# Patient Record
Sex: MALE | Race: BLACK OR AFRICAN AMERICAN | NOT HISPANIC OR LATINO | URBAN - METROPOLITAN AREA
[De-identification: names, ages, dates, MRNs, and addresses within clinical notes are randomized per-mention and may not be internally consistent; named-entity substitution may affect disease eponyms.]

---

## 2018-02-03 ENCOUNTER — INPATIENT (INPATIENT)
Facility: HOSPITAL | Age: 33
LOS: 8 days | Discharge: HOME HEALTH | End: 2018-02-12
Attending: SURGERY | Admitting: SURGERY
Payer: MEDICARE

## 2018-02-03 VITALS
RESPIRATION RATE: 18 BRPM | DIASTOLIC BLOOD PRESSURE: 99 MMHG | HEART RATE: 107 BPM | SYSTOLIC BLOOD PRESSURE: 129 MMHG | OXYGEN SATURATION: 95 %

## 2018-02-03 DIAGNOSIS — I48.91 UNSPECIFIED ATRIAL FIBRILLATION: ICD-10-CM

## 2018-02-03 DIAGNOSIS — K46.0 UNSPECIFIED ABDOMINAL HERNIA WITH OBSTRUCTION, WITHOUT GANGRENE: ICD-10-CM

## 2018-02-03 LAB
ALBUMIN SERPL ELPH-MCNC: 3.6 G/DL — SIGNIFICANT CHANGE UP (ref 3.3–5)
ALP SERPL-CCNC: 55 U/L — SIGNIFICANT CHANGE UP (ref 40–120)
ALT FLD-CCNC: 9 U/L — SIGNIFICANT CHANGE UP (ref 4–41)
APTT BLD: 28.7 SEC — SIGNIFICANT CHANGE UP (ref 27.5–37.4)
AST SERPL-CCNC: 22 U/L — SIGNIFICANT CHANGE UP (ref 4–40)
BASE EXCESS BLDV CALC-SCNC: 2 MMOL/L — SIGNIFICANT CHANGE UP
BASE EXCESS BLDV CALC-SCNC: 5.3 MMOL/L — SIGNIFICANT CHANGE UP
BASOPHILS # BLD AUTO: 0.02 K/UL — SIGNIFICANT CHANGE UP (ref 0–0.2)
BASOPHILS NFR BLD AUTO: 0.2 % — SIGNIFICANT CHANGE UP (ref 0–2)
BILIRUB SERPL-MCNC: 0.9 MG/DL — SIGNIFICANT CHANGE UP (ref 0.2–1.2)
BLD GP AB SCN SERPL QL: NEGATIVE — SIGNIFICANT CHANGE UP
BLOOD GAS VENOUS - CREATININE: 1.28 MG/DL — SIGNIFICANT CHANGE UP (ref 0.5–1.3)
BLOOD GAS VENOUS - CREATININE: 1.44 MG/DL — HIGH (ref 0.5–1.3)
BUN SERPL-MCNC: 24 MG/DL — HIGH (ref 7–23)
CALCIUM SERPL-MCNC: 8.1 MG/DL — LOW (ref 8.4–10.5)
CHLORIDE BLDV-SCNC: 100 MMOL/L — SIGNIFICANT CHANGE UP (ref 96–108)
CHLORIDE BLDV-SCNC: 92 MMOL/L — LOW (ref 96–108)
CHLORIDE SERPL-SCNC: 90 MMOL/L — LOW (ref 98–107)
CO2 SERPL-SCNC: 26 MMOL/L — SIGNIFICANT CHANGE UP (ref 22–31)
CREAT SERPL-MCNC: 1.37 MG/DL — HIGH (ref 0.5–1.3)
EOSINOPHIL # BLD AUTO: 0 K/UL — SIGNIFICANT CHANGE UP (ref 0–0.5)
EOSINOPHIL NFR BLD AUTO: 0 % — SIGNIFICANT CHANGE UP (ref 0–6)
GAS PNL BLDV: 127 MMOL/L — LOW (ref 136–146)
GAS PNL BLDV: 128 MMOL/L — LOW (ref 136–146)
GLUCOSE BLDV-MCNC: 107 — HIGH (ref 70–99)
GLUCOSE BLDV-MCNC: 95 — SIGNIFICANT CHANGE UP (ref 70–99)
GLUCOSE SERPL-MCNC: 95 MG/DL — SIGNIFICANT CHANGE UP (ref 70–99)
HCO3 BLDV-SCNC: 25 MMOL/L — SIGNIFICANT CHANGE UP (ref 20–27)
HCO3 BLDV-SCNC: 27 MMOL/L — SIGNIFICANT CHANGE UP (ref 20–27)
HCT VFR BLD CALC: 39.5 % — SIGNIFICANT CHANGE UP (ref 39–50)
HCT VFR BLDV CALC: 33.8 % — LOW (ref 39–51)
HCT VFR BLDV CALC: 39.5 % — SIGNIFICANT CHANGE UP (ref 39–51)
HGB BLD-MCNC: 12.6 G/DL — LOW (ref 13–17)
HGB BLDV-MCNC: 11 G/DL — LOW (ref 13–17)
HGB BLDV-MCNC: 12.9 G/DL — LOW (ref 13–17)
IMM GRANULOCYTES # BLD AUTO: 0.02 # — SIGNIFICANT CHANGE UP
IMM GRANULOCYTES NFR BLD AUTO: 0.2 % — SIGNIFICANT CHANGE UP (ref 0–1.5)
INR BLD: 1.47 — HIGH (ref 0.88–1.17)
LACTATE BLDV-MCNC: 1 MMOL/L — SIGNIFICANT CHANGE UP (ref 0.5–2)
LACTATE BLDV-MCNC: 1.6 MMOL/L — SIGNIFICANT CHANGE UP (ref 0.5–2)
LYMPHOCYTES # BLD AUTO: 0.7 K/UL — LOW (ref 1–3.3)
LYMPHOCYTES # BLD AUTO: 8.4 % — LOW (ref 13–44)
MCHC RBC-ENTMCNC: 27.3 PG — SIGNIFICANT CHANGE UP (ref 27–34)
MCHC RBC-ENTMCNC: 31.9 % — LOW (ref 32–36)
MCV RBC AUTO: 85.5 FL — SIGNIFICANT CHANGE UP (ref 80–100)
MONOCYTES # BLD AUTO: 0.62 K/UL — SIGNIFICANT CHANGE UP (ref 0–0.9)
MONOCYTES NFR BLD AUTO: 7.5 % — SIGNIFICANT CHANGE UP (ref 2–14)
NEUTROPHILS # BLD AUTO: 6.93 K/UL — SIGNIFICANT CHANGE UP (ref 1.8–7.4)
NEUTROPHILS NFR BLD AUTO: 83.7 % — HIGH (ref 43–77)
NRBC # FLD: 0 — SIGNIFICANT CHANGE UP
PCO2 BLDV: 51 MMHG — SIGNIFICANT CHANGE UP (ref 41–51)
PCO2 BLDV: 56 MMHG — HIGH (ref 41–51)
PH BLDV: 7.35 PH — SIGNIFICANT CHANGE UP (ref 7.32–7.43)
PH BLDV: 7.36 PH — SIGNIFICANT CHANGE UP (ref 7.32–7.43)
PLATELET # BLD AUTO: 222 K/UL — SIGNIFICANT CHANGE UP (ref 150–400)
PMV BLD: 10.7 FL — SIGNIFICANT CHANGE UP (ref 7–13)
PO2 BLDV: 38 MMHG — SIGNIFICANT CHANGE UP (ref 35–40)
PO2 BLDV: < 24 MMHG — LOW (ref 35–40)
POTASSIUM BLDV-SCNC: 3.2 MMOL/L — LOW (ref 3.4–4.5)
POTASSIUM BLDV-SCNC: 3.3 MMOL/L — LOW (ref 3.4–4.5)
POTASSIUM SERPL-MCNC: 3.6 MMOL/L — SIGNIFICANT CHANGE UP (ref 3.5–5.3)
POTASSIUM SERPL-SCNC: 3.6 MMOL/L — SIGNIFICANT CHANGE UP (ref 3.5–5.3)
PROT SERPL-MCNC: 6.9 G/DL — SIGNIFICANT CHANGE UP (ref 6–8.3)
PROTHROM AB SERPL-ACNC: 17 SEC — HIGH (ref 9.8–13.1)
RBC # BLD: 4.62 M/UL — SIGNIFICANT CHANGE UP (ref 4.2–5.8)
RBC # FLD: 13.6 % — SIGNIFICANT CHANGE UP (ref 10.3–14.5)
RH IG SCN BLD-IMP: POSITIVE — SIGNIFICANT CHANGE UP
RH IG SCN BLD-IMP: POSITIVE — SIGNIFICANT CHANGE UP
SAO2 % BLDV: 21.7 % — LOW (ref 60–85)
SAO2 % BLDV: 66.2 % — SIGNIFICANT CHANGE UP (ref 60–85)
SODIUM SERPL-SCNC: 131 MMOL/L — LOW (ref 135–145)
WBC # BLD: 8.29 K/UL — SIGNIFICANT CHANGE UP (ref 3.8–10.5)
WBC # FLD AUTO: 8.29 K/UL — SIGNIFICANT CHANGE UP (ref 3.8–10.5)

## 2018-02-03 PROCEDURE — 74177 CT ABD & PELVIS W/CONTRAST: CPT | Mod: 26

## 2018-02-03 PROCEDURE — 93010 ELECTROCARDIOGRAM REPORT: CPT | Mod: 76

## 2018-02-03 PROCEDURE — 44160 REMOVAL OF COLON: CPT

## 2018-02-03 PROCEDURE — 99222 1ST HOSP IP/OBS MODERATE 55: CPT | Mod: 57,GC

## 2018-02-03 PROCEDURE — 71045 X-RAY EXAM CHEST 1 VIEW: CPT | Mod: 26

## 2018-02-03 PROCEDURE — 49587: CPT | Mod: 59

## 2018-02-03 RX ORDER — RISPERIDONE 4 MG/1
1 TABLET ORAL
Qty: 0 | Refills: 0 | Status: DISCONTINUED | OUTPATIENT
Start: 2018-02-03 | End: 2018-02-03

## 2018-02-03 RX ORDER — METOPROLOL TARTRATE 50 MG
5 TABLET ORAL ONCE
Qty: 0 | Refills: 0 | Status: COMPLETED | OUTPATIENT
Start: 2018-02-03 | End: 2018-02-03

## 2018-02-03 RX ORDER — ACETAMINOPHEN 500 MG
1000 TABLET ORAL ONCE
Qty: 0 | Refills: 0 | Status: COMPLETED | OUTPATIENT
Start: 2018-02-04 | End: 2018-02-04

## 2018-02-03 RX ORDER — ENOXAPARIN SODIUM 100 MG/ML
40 INJECTION SUBCUTANEOUS EVERY 24 HOURS
Qty: 0 | Refills: 0 | Status: DISCONTINUED | OUTPATIENT
Start: 2018-02-03 | End: 2018-02-03

## 2018-02-03 RX ORDER — SODIUM CHLORIDE 9 MG/ML
1000 INJECTION INTRAMUSCULAR; INTRAVENOUS; SUBCUTANEOUS ONCE
Qty: 0 | Refills: 0 | Status: COMPLETED | OUTPATIENT
Start: 2018-02-03 | End: 2018-02-03

## 2018-02-03 RX ORDER — SODIUM CHLORIDE 9 MG/ML
500 INJECTION, SOLUTION INTRAVENOUS ONCE
Qty: 0 | Refills: 0 | Status: COMPLETED | OUTPATIENT
Start: 2018-02-03 | End: 2018-02-03

## 2018-02-03 RX ORDER — RISPERIDONE 4 MG/1
1 TABLET ORAL
Qty: 0 | Refills: 0 | Status: DISCONTINUED | OUTPATIENT
Start: 2018-02-03 | End: 2018-02-04

## 2018-02-03 RX ORDER — ACETAMINOPHEN 500 MG
1000 TABLET ORAL ONCE
Qty: 0 | Refills: 0 | Status: COMPLETED | OUTPATIENT
Start: 2018-02-03 | End: 2018-02-03

## 2018-02-03 RX ORDER — SODIUM CHLORIDE 9 MG/ML
500 INJECTION INTRAMUSCULAR; INTRAVENOUS; SUBCUTANEOUS ONCE
Qty: 0 | Refills: 0 | Status: COMPLETED | OUTPATIENT
Start: 2018-02-03 | End: 2018-02-03

## 2018-02-03 RX ORDER — METOPROLOL TARTRATE 50 MG
5 TABLET ORAL EVERY 6 HOURS
Qty: 0 | Refills: 0 | Status: DISCONTINUED | OUTPATIENT
Start: 2018-02-03 | End: 2018-02-04

## 2018-02-03 RX ORDER — MIDAZOLAM HYDROCHLORIDE 1 MG/ML
2 INJECTION, SOLUTION INTRAMUSCULAR; INTRAVENOUS ONCE
Qty: 0 | Refills: 0 | Status: DISCONTINUED | OUTPATIENT
Start: 2018-02-03 | End: 2018-02-03

## 2018-02-03 RX ORDER — METOPROLOL TARTRATE 50 MG
10 TABLET ORAL ONCE
Qty: 0 | Refills: 0 | Status: COMPLETED | OUTPATIENT
Start: 2018-02-03 | End: 2018-02-03

## 2018-02-03 RX ORDER — ENOXAPARIN SODIUM 100 MG/ML
40 INJECTION SUBCUTANEOUS EVERY 24 HOURS
Qty: 0 | Refills: 0 | Status: DISCONTINUED | OUTPATIENT
Start: 2018-02-03 | End: 2018-02-12

## 2018-02-03 RX ORDER — TRAZODONE HCL 50 MG
150 TABLET ORAL AT BEDTIME
Qty: 0 | Refills: 0 | Status: DISCONTINUED | OUTPATIENT
Start: 2018-02-03 | End: 2018-02-03

## 2018-02-03 RX ORDER — DIVALPROEX SODIUM 500 MG/1
500 TABLET, DELAYED RELEASE ORAL AT BEDTIME
Qty: 0 | Refills: 0 | Status: DISCONTINUED | OUTPATIENT
Start: 2018-02-03 | End: 2018-02-03

## 2018-02-03 RX ORDER — VALPROIC ACID (AS SODIUM SALT) 250 MG/5ML
250 SOLUTION, ORAL ORAL
Qty: 0 | Refills: 0 | Status: DISCONTINUED | OUTPATIENT
Start: 2018-02-03 | End: 2018-02-04

## 2018-02-03 RX ORDER — ALBUMIN HUMAN 25 %
250 VIAL (ML) INTRAVENOUS ONCE
Qty: 0 | Refills: 0 | Status: COMPLETED | OUTPATIENT
Start: 2018-02-03 | End: 2018-02-03

## 2018-02-03 RX ORDER — PIPERACILLIN AND TAZOBACTAM 4; .5 G/20ML; G/20ML
3.38 INJECTION, POWDER, LYOPHILIZED, FOR SOLUTION INTRAVENOUS EVERY 8 HOURS
Qty: 0 | Refills: 0 | Status: COMPLETED | OUTPATIENT
Start: 2018-02-03 | End: 2018-02-07

## 2018-02-03 RX ORDER — HYDROMORPHONE HYDROCHLORIDE 2 MG/ML
0.5 INJECTION INTRAMUSCULAR; INTRAVENOUS; SUBCUTANEOUS
Qty: 0 | Refills: 0 | Status: DISCONTINUED | OUTPATIENT
Start: 2018-02-03 | End: 2018-02-04

## 2018-02-03 RX ORDER — DRONEDARONE 400 MG/1
400 TABLET, FILM COATED ORAL
Qty: 0 | Refills: 0 | Status: DISCONTINUED | OUTPATIENT
Start: 2018-02-03 | End: 2018-02-04

## 2018-02-03 RX ORDER — DIAZEPAM 5 MG
1 TABLET ORAL
Qty: 12 | Refills: 0 | OUTPATIENT
Start: 2018-02-03 | End: 2018-02-06

## 2018-02-03 RX ORDER — METOPROLOL TARTRATE 50 MG
50 TABLET ORAL
Qty: 0 | Refills: 0 | Status: DISCONTINUED | OUTPATIENT
Start: 2018-02-03 | End: 2018-02-03

## 2018-02-03 RX ORDER — SODIUM CHLORIDE 9 MG/ML
1000 INJECTION INTRAMUSCULAR; INTRAVENOUS; SUBCUTANEOUS
Qty: 0 | Refills: 0 | Status: DISCONTINUED | OUTPATIENT
Start: 2018-02-03 | End: 2018-02-03

## 2018-02-03 RX ORDER — TRAZODONE HCL 50 MG
150 TABLET ORAL AT BEDTIME
Qty: 0 | Refills: 0 | Status: DISCONTINUED | OUTPATIENT
Start: 2018-02-03 | End: 2018-02-04

## 2018-02-03 RX ORDER — SODIUM CHLORIDE 9 MG/ML
1000 INJECTION INTRAMUSCULAR; INTRAVENOUS; SUBCUTANEOUS
Qty: 0 | Refills: 0 | Status: DISCONTINUED | OUTPATIENT
Start: 2018-02-03 | End: 2018-02-04

## 2018-02-03 RX ORDER — MORPHINE SULFATE 50 MG/1
2 CAPSULE, EXTENDED RELEASE ORAL EVERY 6 HOURS
Qty: 0 | Refills: 0 | Status: DISCONTINUED | OUTPATIENT
Start: 2018-02-03 | End: 2018-02-04

## 2018-02-03 RX ORDER — MORPHINE SULFATE 50 MG/1
4 CAPSULE, EXTENDED RELEASE ORAL EVERY 6 HOURS
Qty: 0 | Refills: 0 | Status: DISCONTINUED | OUTPATIENT
Start: 2018-02-03 | End: 2018-02-04

## 2018-02-03 RX ORDER — POTASSIUM CHLORIDE 20 MEQ
10 PACKET (EA) ORAL
Qty: 0 | Refills: 0 | Status: COMPLETED | OUTPATIENT
Start: 2018-02-03 | End: 2018-02-03

## 2018-02-03 RX ORDER — VANCOMYCIN HCL 1 G
1000 VIAL (EA) INTRAVENOUS ONCE
Qty: 0 | Refills: 0 | Status: COMPLETED | OUTPATIENT
Start: 2018-02-03 | End: 2018-02-03

## 2018-02-03 RX ORDER — PIPERACILLIN AND TAZOBACTAM 4; .5 G/20ML; G/20ML
3.38 INJECTION, POWDER, LYOPHILIZED, FOR SOLUTION INTRAVENOUS ONCE
Qty: 0 | Refills: 0 | Status: COMPLETED | OUTPATIENT
Start: 2018-02-03 | End: 2018-02-03

## 2018-02-03 RX ADMIN — Medication 1000 MILLIGRAM(S): at 17:13

## 2018-02-03 RX ADMIN — SODIUM CHLORIDE 2000 MILLILITER(S): 9 INJECTION INTRAMUSCULAR; INTRAVENOUS; SUBCUTANEOUS at 22:15

## 2018-02-03 RX ADMIN — Medication 500 MILLILITER(S): at 23:28

## 2018-02-03 RX ADMIN — SODIUM CHLORIDE 1500 MILLILITER(S): 9 INJECTION INTRAMUSCULAR; INTRAVENOUS; SUBCUTANEOUS at 20:45

## 2018-02-03 RX ADMIN — SODIUM CHLORIDE 1500 MILLILITER(S): 9 INJECTION, SOLUTION INTRAVENOUS at 20:15

## 2018-02-03 RX ADMIN — SODIUM CHLORIDE 1000 MILLILITER(S): 9 INJECTION INTRAMUSCULAR; INTRAVENOUS; SUBCUTANEOUS at 14:18

## 2018-02-03 RX ADMIN — Medication 100 MILLIEQUIVALENT(S): at 20:40

## 2018-02-03 RX ADMIN — Medication 250 MILLIGRAM(S): at 17:13

## 2018-02-03 RX ADMIN — MIDAZOLAM HYDROCHLORIDE 2 MILLIGRAM(S): 1 INJECTION, SOLUTION INTRAMUSCULAR; INTRAVENOUS at 15:08

## 2018-02-03 RX ADMIN — Medication 400 MILLIGRAM(S): at 14:59

## 2018-02-03 RX ADMIN — Medication 100 MILLIEQUIVALENT(S): at 21:43

## 2018-02-03 RX ADMIN — Medication 10 MILLIGRAM(S): at 22:15

## 2018-02-03 RX ADMIN — PIPERACILLIN AND TAZOBACTAM 25 GRAM(S): 4; .5 INJECTION, POWDER, LYOPHILIZED, FOR SOLUTION INTRAVENOUS at 23:44

## 2018-02-03 RX ADMIN — Medication 5 MILLIGRAM(S): at 22:15

## 2018-02-03 RX ADMIN — SODIUM CHLORIDE 2000 MILLILITER(S): 9 INJECTION INTRAMUSCULAR; INTRAVENOUS; SUBCUTANEOUS at 17:13

## 2018-02-03 RX ADMIN — PIPERACILLIN AND TAZOBACTAM 200 GRAM(S): 4; .5 INJECTION, POWDER, LYOPHILIZED, FOR SOLUTION INTRAVENOUS at 16:02

## 2018-02-03 NOTE — CONSULT NOTE ADULT - ASSESSMENT
33yoM w/ PMHx HTN, autism (non-verbal at baseline), chronic umbilical hernia, PAF (no AC at home) p/w perceived abd pain found to have ventral hernia with gangrene due to Cardona's hernia and foreign body.  In PACU, patient to have afib with RVR HR up to 130's and hypotensive. 33yoM w/ PMHx HTN, autism (non-verbal at baseline), chronic umbilical hernia, PAF (no AC at home) p/w perceived abd pain found to have ventral hernia with gangrene due to Cardona's hernia and foreign body.  In PACU, patient to have afib with RVR HR up to 130's and hypotensive.     PAULO-VASC score 1  HAS-BLED score 2

## 2018-02-03 NOTE — ED PROVIDER NOTE - OBJECTIVE STATEMENT
33m w autism, non-verbal at baseline, HTN, here today for perceived abd pain. Pt has chronic umbilical hernia (unknown whether congenital). Was seen at [facility] ED 1/31 for 1 episode of vomiting, and was discharged w presumed dx of viral gastro. Since then has not vomited, but family have perceived that pt appears more uncomfortable in recent few days, shameka when rising from seated. Had recent URI sx including cough that have largely resolved. No fever. Tristiniyl denies any recent abd imaging.

## 2018-02-03 NOTE — ED PROVIDER NOTE - PROGRESS NOTE DETAILS
Elizabeth Goldberger PGY-1: radiology called re CTAP, suspicious for strangulated hernia w possible ischemic changes. calling surg

## 2018-02-03 NOTE — H&P ADULT - NSHPLABSRESULTS_GEN_ALL_CORE
12.6   8.29  )-----------( 222      ( 03 Feb 2018 13:06 )             39.5       02-03    131<L>  |  90<L>  |  24<H>  ----------------------------<  95  3.6   |  26  |  1.37<H>    Ca    8.1<L>      03 Feb 2018 13:06    TPro  6.9  /  Alb  3.6  /  TBili  0.9  /  DBili  x   /  AST  22  /  ALT  9   /  AlkPhos  55  02-03    PT/INR - ( 03 Feb 2018 16:58 )   PT: 17.0 SEC;   INR: 1.47     PTT - ( 03 Feb 2018 16:58 )  PTT:28.7 SEC    Blood Typing (ABO + Rho D) (02.03.18 @ 17:18)    Rh Interpretation: Positive    ABO Interpretation: A      Imaging and other studies:  CT abd/pelvis: (final read pending; preliminary read base on reviewed of imaging w/ Radiologist in person) incarcerated umbilical hernia containing bowel and possible foreign object in hernia & colon, concern for bowel ischemia as bowel walls not enhancing.      Xray Chest 1 View AP/PA (02.03.18 @ 15:27) >    INTERPRETATION:  Patchy right lung opacities are likely infectious in   etiology.  Cardiac size is enlarged.

## 2018-02-03 NOTE — ED ADULT NURSE NOTE - OBJECTIVE STATEMENT
pts family brought pt in for hernia that normally is soft --appears hard with slight redness around umbilical area--pt had #20 IV placed in lt antecubital--pt had bloods drawn and sent--pt seen by MD and CAT scan scheduled. Pt given versed by MD for sedation in CAT scan

## 2018-02-03 NOTE — H&P ADULT - NSHPPHYSICALEXAM_GEN_ALL_CORE
T(C): 37.1 (02-03-18 @ 16:02)  HR: 82 (02-03-18 @ 16:02) (82 - 107)  BP: 87/43 (02-03-18 @ 16:02) (87/43 - 129/99)  RR: 21 (02-03-18 @ 16:02) (18 - 21)  SpO2: 97% (02-03-18 @ 16:02) (95% - 97%)  Wt(kg): --  Tmax: T(C): , Max: 37.1 (02-03-18 @ 16:02)  Wt(kg): --    General: well developed, well nourished, NAD  Neuro: moves all extremities spontaneously; does not answer questions  HEENT: dysmorphic, anicteric, mucosa moist  Respiratory: airway patent, respirations unlabored on NC  CVS: regular  Abdomen: tender (grimaces), distended at umbilicus; erythema & discoloration of skin overlying umbilicus  Extremities: no edema, movement grossly intact

## 2018-02-03 NOTE — ED PROVIDER NOTE - ATTENDING CONTRIBUTION TO CARE
I performed a history and physical exam of the patient and discussed their management with the resident and /or advanced care provider. I reviewed the resident and /or ACP's note and agree with the documented findings and plan of care. My medical decision making and observations are found above.  Abd soft, red swollen skin above umbilicus.

## 2018-02-03 NOTE — BRIEF OPERATIVE NOTE - PROCEDURE
<<-----Click on this checkbox to enter Procedure Laparotomy  02/03/2018  ileocecectomy  Active  [id number]

## 2018-02-03 NOTE — H&P ADULT - ASSESSMENT
33M PMHx autism, HTN, chronic umbilical hernia presenting w/ incarcerated umbilical hernia containing possible foreign object. Requires surgical reduction & possible bowel resection.    #Admit to General Surgery A team, Attending Dr. Beckford      -Guardian consented for: umbilical hernia repair, possible mesh, possible exploratory laparotomy, possible ostomy, possible bowel resection.      -Diet: NPO  -DVTppx: lovenox, SCD's  -IVF: NS @100/h  -monitor QTc for prolongation, per Pharmacy due to interaction of dronedarone & psychiatric Rx    -continue home Rx as tolerated; may require IV formulations if NPO post-op, will curbside psychiatry/neurology/pharmacy if necessary for guidance.    #con't home psych/neuro Rx: divalproex, risperidone, trazodone  #hold: temazepam  #con't home cardiac Rx: dronedarone; metoprolol tartrate in lieu of metoprolol ER      WILFRED Campbell MD (PGY2)    (Please page A team [phone] for questions/concerns.)

## 2018-02-03 NOTE — CONSULT NOTE ADULT - ASSESSMENT
33M PMHx autism, HTN, chronic umbilical hernia presenting w/ incarcerated umbilical hernia containing possible foreign object. Requires surgical reduction & possible bowel resection.    Admit to General Surgery A team, Attending Dr. Beckford    -Guardian consented for: umbilical hernia repair, possible mesh, possible exploratory laparotomy, possible ostomy, possible bowel resection.    -Diet: NPO  -DVTppx: lovenox  -IVF: NS @100/h    -continue home Rx as tolerated; may require IV formulations if NPO post-op        WILFRED Campbell MD (PGY2)    (Please page A team [phone] for questions/concerns.)

## 2018-02-03 NOTE — CONSULT NOTE ADULT - SUBJECTIVE AND OBJECTIVE BOX
General Surgery Consult  A team [id number]    CC: 33y old Male admitted with a chief complaint of , now    (history obtained from sisters at bedside, chart & sister Doris on phone    HPI:  33M PMHx autism (non-verbal at baseline), HTN, chronic umbilical hernia presented to [facility] ED the afternoon of 2/3/18 for perceived abd pain. Unknown how long hernia present, or whether congenital; never able to reduce. Recently presented to OSH 3 days prior to presentation ([facility] ED 1/31) complaining of 1 episode of vomiting, and sent home w/ dx of viral gastroenteritis. Since that episode patient has not vomited, but family have perceived that pt appears more uncomfortable in recent few days and has been hiccuping. His umbilical area also appeared to grow in size & the overlying skin became red. Recent URI sx including cough that have largely resolved. Denies fever. last BM the morning of presentation and in ED. Patient hospitalized ~2years ago at [facility] (unclear reason), and was discharged on puree/mush diet for ~6months.     PMHx: Hypertension  Autism spectrum disorder    PSHx:   Medications (inpatient): sodium chloride 0.9% Bolus 1000 milliLiter(s) IV Bolus once  vancomycin  IVPB 1000 milliGRAM(s) IV Intermittent once    Medications (PRN):  Allergies: No Known Allergies  (Intolerances: )  Social Hx:     Physical Exam  T(C): 37.1 (02-03-18 @ 16:02)  HR: 82 (02-03-18 @ 16:02) (82 - 107)  BP: 87/43 (02-03-18 @ 16:02) (87/43 - 129/99)  RR: 21 (02-03-18 @ 16:02) (18 - 21)  SpO2: 97% (02-03-18 @ 16:02) (95% - 97%)  Wt(kg): --  Tmax: T(C): , Max: 37.1 (02-03-18 @ 16:02)  Wt(kg): --    General: well developed, well nourished, NAD  Neuro: alert and oriented, no focal deficits, moves all extremities spontaneously  HEENT: NCAT, EOMI, anicteric, mucosa moist  Respiratory: airway patent, respirations unlabored  CVS: regular rate and rhythm  Abdomen: soft, nontender, nondistended  Extremities: no edema, sensation and movement grossly intact  Skin: warm, dry, appropriate color    Labs:                        12.6   8.29  )-----------( 222      ( 03 Feb 2018 13:06 )             39.5       02-03    131<L>  |  90<L>  |  24<H>  ----------------------------<  95  3.6   |  26  |  1.37<H>    Ca    8.1<L>      03 Feb 2018 13:06    TPro  6.9  /  Alb  3.6  /  TBili  0.9  /  DBili  x   /  AST  22  /  ALT  9   /  AlkPhos  55  02-03            Imaging and other studies: General Surgery Consult  A team [id number]    CC: 33y old Male admitted with a chief complaint of , now    (history obtained from sisters at bedside, chart & sister Doris on phone    HPI:  33M PMHx autism (non-verbal at baseline), HTN, chronic umbilical hernia presented to [facility] ED the afternoon of 2/3/18 for perceived abd pain. Unknown how long hernia present, or whether congenital; never able to reduce. Recently presented to OSH 3 days prior to presentation ([facility] ED 1/31) complaining of 1 episode of vomiting, and sent home w/ dx of viral gastroenteritis. Since that episode patient has not vomited, but family have perceived that pt appears more uncomfortable in recent few days and has been hiccuping. His umbilical area also appeared to grow in size & the overlying skin became red. Recent URI sx including cough that have largely resolved. Denies fever. last BM the morning of presentation and in ED. Patient hospitalized ~2years ago at [facility] (after eating a banana whole, including peel) for esophageal tear (no operative intervention) and was discharged on liquid then puree/mush diet for ~6months. Patient reportedly will eat foreign objects within reach.     In ED VS: T37.1, P107, /99, RR18, SpO2 95% on RA. Labs notable for 83% neutrophils, Na 131, Cl 90, BUN 24, Cr 1.4, venous lactate 1.6. CT showing incarcerated umbilical hernia containing bowel, possibly ischemic changes and foreign objects in hernia & R colon. General Surgery Consulted.     Guardian: Doris Torres (sister) [phone]    PMHx: Hypertension  Autism spectrum disorder    PSHx: none    Medications (inpatient): sodium chloride 0.9% Bolus 1000 milliLiter(s) IV Bolus once  vancomycin  IVPB 1000 milliGRAM(s) IV Intermittent once    Medications (PRN): x    Home Medications:  Multaq (Dronaderone) 400mg BID  Metoprolol ER 100mg qd  Risperidone 1mg BID  Divalproex  qhs  Trazadone 150mg qhs  Temazepam 15mg PRN qhs    Allergies: No Known Allergies  (Intolerances: x)  Social Hx: autistic, non-verbal baseline, repeats back what is said to him. Sister is guardian.     Physical Exam  T(C): 37.1 (02-03-18 @ 16:02)  HR: 82 (02-03-18 @ 16:02) (82 - 107)  BP: 87/43 (02-03-18 @ 16:02) (87/43 - 129/99)  RR: 21 (02-03-18 @ 16:02) (18 - 21)  SpO2: 97% (02-03-18 @ 16:02) (95% - 97%)  Wt(kg): --  Tmax: T(C): , Max: 37.1 (02-03-18 @ 16:02)  Wt(kg): --    General: well developed, well nourished, NAD  Neuro: alert and oriented, no focal deficits, moves all extremities spontaneously  HEENT: NCAT, EOMI, anicteric, mucosa moist  Respiratory: airway patent, respirations unlabored  CVS: regular rate and rhythm  Abdomen: soft, nontender, nondistended  Extremities: no edema, sensation and movement grossly intact  Skin: warm, dry, appropriate color    Labs:                        12.6   8.29  )-----------( 222      ( 03 Feb 2018 13:06 )             39.5       02-03    131<L>  |  90<L>  |  24<H>  ----------------------------<  95  3.6   |  26  |  1.37<H>    Ca    8.1<L>      03 Feb 2018 13:06    TPro  6.9  /  Alb  3.6  /  TBili  0.9  /  DBili  x   /  AST  22  /  ALT  9   /  AlkPhos  55  02-03            Imaging and other studies: General Surgery Consult  A team [id number]    CC: 33y old Male admitted with a chief complaint of , now    (history obtained from sisters at bedside, chart & sister Doris on phone    HPI:  33M PMHx autism (non-verbal at baseline), HTN, chronic umbilical hernia presented to [facility] ED the afternoon of 2/3/18 for perceived abd pain. Unknown how long hernia present, or whether congenital; never able to reduce. Recently presented to OSH 3 days prior to presentation ([facility] ED 1/31) complaining of 1 episode of vomiting, and sent home w/ dx of viral gastroenteritis. Since that episode patient has not vomited, but family have perceived that pt appears more uncomfortable in recent few days and has been hiccuping. His umbilical area also appeared to grow in size & the overlying skin became red. Recent URI sx including cough that have largely resolved. Denies fever. last BM the morning of presentation and in ED. Patient hospitalized ~2 years ago at [facility] (after eating a banana whole, including peel) for esophageal tear (no operative intervention) and was discharged on liquid then puree/mush diet for ~6months. Patient reportedly will eat foreign objects within reach.     In ED VS: T37.1, P107, /99, RR18, SpO2 95% on RA. Labs notable for 83% neutrophils, Na 131, Cl 90, BUN 24, Cr 1.4, venous lactate 1.6. CT showing incarcerated umbilical hernia containing bowel, possibly ischemic changes and foreign objects in hernia & R colon. Received 2L NS, vanc/zosyn. General Surgery Consulted.     Guardian: Doris Torres (sister) [phone]    PMHx: Hypertension  Autism spectrum disorder    PSHx: none    Medications (inpatient): sodium chloride 0.9% Bolus 1000 milliLiter(s) IV Bolus once  vancomycin  IVPB 1000 milliGRAM(s) IV Intermittent once    Medications (PRN): x    Home Medications:  Multaq (Dronaderone) 400mg BID  Metoprolol ER 100mg qd  Risperidone 1mg BID  Divalproex  qhs  Trazadone 150mg qhs  Temazepam 15mg PRN qhs    Allergies: No Known Allergies  (Intolerances: x)  Social Hx: autistic, non-verbal baseline, repeats back what is said to him. Sister is guardian.     Physical Exam  T(C): 37.1 (02-03-18 @ 16:02)  HR: 82 (02-03-18 @ 16:02) (82 - 107)  BP: 87/43 (02-03-18 @ 16:02) (87/43 - 129/99)  RR: 21 (02-03-18 @ 16:02) (18 - 21)  SpO2: 97% (02-03-18 @ 16:02) (95% - 97%)  Wt(kg): --  Tmax: T(C): , Max: 37.1 (02-03-18 @ 16:02)  Wt(kg): --    General: well developed, well nourished, NAD  Neuro: moves all extremities spontaneously; does not answer questions  HEENT: dysmorphic, anicteric, mucosa moist  Respiratory: airway patent, respirations unlabored on NC  CVS: regular  Abdomen: tender (grimaces), distended at umbilicus; erythema & discoloration of skin overlying umbilicus  Extremities: no edema, movement grossly intact      Labs:                        12.6   8.29  )-----------( 222      ( 03 Feb 2018 13:06 )             39.5       02-03    131<L>  |  90<L>  |  24<H>  ----------------------------<  95  3.6   |  26  |  1.37<H>    Ca    8.1<L>      03 Feb 2018 13:06    TPro  6.9  /  Alb  3.6  /  TBili  0.9  /  DBili  x   /  AST  22  /  ALT  9   /  AlkPhos  55  02-03            Imaging and other studies: General Surgery Consult  A team [id number]    CC: 33y old Male admitted with a chief complaint of , now    (history obtained from sisters at bedside, chart & sister Doris on phone    HPI:  33M PMHx autism (non-verbal at baseline), HTN, chronic umbilical hernia presented to [facility] ED the afternoon of 2/3/18 for perceived abd pain. Unknown how long hernia present, or whether congenital; never able to reduce. Recently presented to OSH 3 days prior to presentation ([facility] ED 1/31) complaining of 1 episode of vomiting, and sent home w/ dx of viral gastroenteritis. Since that episode patient has not vomited, but family have perceived that pt appears more uncomfortable in recent few days and has been hiccuping. His umbilical area also appeared to grow in size & the overlying skin became red. Recent URI sx including cough that have largely resolved. Denies fever. last BM the morning of presentation and in ED. Patient hospitalized ~2 years ago at [facility] (after eating a banana whole, including peel) for esophageal tear (no operative intervention) and was discharged on liquid then puree/mush diet for ~6months. Patient reportedly will eat foreign objects within reach.     In ED VS: T37.1, P107, /99, RR18, SpO2 95% on RA. Labs notable for 83% neutrophils, Na 131, Cl 90, BUN 24, Cr 1.4, venous lactate 1.6. CT showing incarcerated umbilical hernia containing bowel, possibly ischemic changes and foreign objects in hernia & R colon. Received 2L NS, vanc/zosyn. General Surgery Consulted.     Guardian: Doris Torres (sister) [phone]    PMHx: Hypertension  Autism spectrum disorder    PSHx: none    Medications (inpatient): sodium chloride 0.9% Bolus 1000 milliLiter(s) IV Bolus once  vancomycin  IVPB 1000 milliGRAM(s) IV Intermittent once    Medications (PRN): x    Home Medications:  Multaq (Dronaderone) 400mg BID  Metoprolol ER 100mg qd  Risperidone 1mg BID  Divalproex  qhs  Trazadone 150mg qhs  Temazepam 15mg PRN qhs    Allergies: No Known Allergies  (Intolerances: x)  Social Hx: autistic, non-verbal baseline, repeats back what is said to him. Sister is guardian.     Physical Exam  T(C): 37.1 (02-03-18 @ 16:02)  HR: 82 (02-03-18 @ 16:02) (82 - 107)  BP: 87/43 (02-03-18 @ 16:02) (87/43 - 129/99)  RR: 21 (02-03-18 @ 16:02) (18 - 21)  SpO2: 97% (02-03-18 @ 16:02) (95% - 97%)  Wt(kg): --  Tmax: T(C): , Max: 37.1 (02-03-18 @ 16:02)  Wt(kg): --    General: well developed, well nourished, NAD  Neuro: moves all extremities spontaneously; does not answer questions  HEENT: dysmorphic, anicteric, mucosa moist  Respiratory: airway patent, respirations unlabored on NC  CVS: regular  Abdomen: tender (grimaces), distended at umbilicus; erythema & discoloration of skin overlying umbilicus  Extremities: no edema, movement grossly intact      Labs:                        12.6   8.29  )-----------( 222      ( 03 Feb 2018 13:06 )             39.5       02-03    131<L>  |  90<L>  |  24<H>  ----------------------------<  95  3.6   |  26  |  1.37<H>    Ca    8.1<L>      03 Feb 2018 13:06    TPro  6.9  /  Alb  3.6  /  TBili  0.9  /  DBili  x   /  AST  22  /  ALT  9   /  AlkPhos  55  02-03      Imaging and other studies:  CT abd/pelvis: final read pending

## 2018-02-03 NOTE — ED PROVIDER NOTE - MEDICAL DECISION MAKING DETAILS
33m w ASD and umbilical hernia here for perceived abd pain, w erythema, firmness and induration of hernia. Concern for incarcerated hernia though remainder of abd soft, vs abscess at site of hernia. Will check cmp, cbc, vbg, ctap; fluids; reassess 33m w ASD and umbilical hernia here for perceived abd pain, w erythema, firmness and induration of hernia. Concern for incarcerated hernia though remainder of abd soft, vs abscess at site of hernia. Will check cmp, cbc, vbg, ctap; fluids; reassess.  Malachi: red swollen area above umbilicul, hernia vs infection. Will CT. Not obviously reducible hernia. autism makes it hard to assess pain.

## 2018-02-03 NOTE — H&P ADULT - HISTORY OF PRESENT ILLNESS
33M PMHx autism (non-verbal at baseline), HTN, chronic umbilical hernia presented to [facility] ED the afternoon of 2/3/18 for perceived abd pain. Unknown how long hernia present, or whether congenital; never able to reduce. Recently presented to OSH 3 days prior to presentation ([facility] ED 1/31) complaining of 1 episode of vomiting, and sent home w/ dx of viral gastroenteritis. Since that episode patient has not vomited, but family have perceived that pt appears more uncomfortable in recent few days and has been hiccuping. His umbilical area also appeared to grow in size & the overlying skin became red. Recent URI sx including cough that have largely resolved. Denies fever. last BM the morning of presentation and in ED. Patient hospitalized ~2 years ago at [facility] (after eating a banana whole, including peel) for esophageal tear (no operative intervention) and was discharged on liquid then puree/mush diet for ~6months. Patient reportedly will eat foreign objects within reach.     In ED VS: T37.1, P107, /99, RR18, SpO2 95% on RA. Labs notable for 83% neutrophils, Na 131, Cl 90, BUN 24, Cr 1.4, venous lactate 1.6. CT showing incarcerated umbilical hernia containing bowel, possibly ischemic changes and foreign objects in hernia & R colon. Received 2L NS, vanc/zosyn. General Surgery Consulted.

## 2018-02-03 NOTE — BRIEF OPERATIVE NOTE - POST-OP DX
Ventral hernia with gangrene  02/03/2018  due to Cardona's hernia and foreign body (likely chicken bone)  Active  Justyna Brush

## 2018-02-03 NOTE — CONSULT NOTE ADULT - SUBJECTIVE AND OBJECTIVE BOX
Date of Admission: 2/3/2018    CHIEF COMPLAINT: abdominal pain    HISTORY OF PRESENT ILLNESS:  This is a 33yoM w/ PMHx HTN, autism (non-verbal at baseline), chronic umbilical hernia, PAF (no AC at home) p/w perceived abd pain found to have ventral hernia with gangrene due to Cardona's hernia and foreign body.  In PACU, patient to have afib with RVR HR up to 130's and hypotensive.  Patient was given metoprolol 10mg IVP with minimal response.  Hypotension responded to fluid boluses.  Cardiology consult for Afib w/ RVR.    Allergies  No Known Allergies    MEDICATIONS:  diltiazem Injectable 10 milliGRAM(s) IV Push once  dronedarone 400 milliGRAM(s) Oral two times a day  enoxaparin Injectable 40 milliGRAM(s) SubCutaneous every 24 hours  metoprolol    tartrate Injectable 5 milliGRAM(s) IV Push every 6 hours  metoprolol    tartrate Injectable 10 milliGRAM(s) IV Push once  metoprolol    tartrate Injectable 5 milliGRAM(s) IV Push once  piperacillin/tazobactam IVPB. 3.375 Gram(s) IV Intermittent every 8 hours  HYDROmorphone  Injectable 0.5 milliGRAM(s) IV Push every 10 minutes PRN  morphine  - Injectable 2 milliGRAM(s) IV Push every 6 hours PRN  morphine  - Injectable 4 milliGRAM(s) IV Push every 6 hours PRN  risperiDONE   Tablet 1 milliGRAM(s) Oral two times a day  traZODone 150 milliGRAM(s) Oral at bedtime  valproic  acid Syrup 250 milliGRAM(s) Oral two times a day  sodium chloride 0.9% Bolus 1000 milliLiter(s) IV Bolus once  sodium chloride 0.9%. 1000 milliLiter(s) IV Continuous <Continuous>      PAST MEDICAL & SURGICAL HISTORY:  Hypertension  Autism spectrum disorder  No significant past surgical history    FAMILY HISTORY:  No pertinent family history in first degree relatives    SOCIAL HISTORY:    Denies alcohol, smoking, illicit drug use    REVIEW OF SYSTEMS:  See HPI. Otherwise, 10 point ROS done and otherwise negative.    PHYSICAL EXAM:  T(C): 36.8 (02-03-18 @ 19:55), Max: 37.1 (02-03-18 @ 16:02)  HR: 120 (02-03-18 @ 22:00) (76 - 132)  BP: 106/53 (02-03-18 @ 21:45) (87/43 - 129/99)  RR: 18 (02-03-18 @ 22:00) (15 - 26)  SpO2: 94% (02-03-18 @ 22:00) (94% - 100%)  Wt(kg): --  I&O's Summary    03 Feb 2018 07:01  -  03 Feb 2018 22:30  --------------------------------------------------------  IN: 200 mL / OUT: 145 mL / NET: 55 mL      Appearance: Normal	  HEENT:   Normal oral mucosa, PERRL, EOMI	  Lymphatic: No lymphadenopathy  Cardiovascular: Normal S1 S2, No JVD, No murmurs, No edema  Respiratory: Lungs clear to auscultation	  Gastrointestinal:  Soft, Non-tender, + BS	  Skin: No rashes, No ecchymoses, No cyanosis	  Neurologic: Nonverbal  Extremities: Normal range of motion, No clubbing, cyanosis or edema  Vascular: Peripheral pulses palpable 2+ bilaterally    LABS:	 	  CBC Full  -  ( 03 Feb 2018 13:06 )  WBC Count : 8.29 K/uL  Hemoglobin : 12.6 g/dL  Hematocrit : 39.5 %  Platelet Count - Automated : 222 K/uL  Mean Cell Volume : 85.5 fL  Mean Cell Hemoglobin : 27.3 pg  Mean Cell Hemoglobin Concentration : 31.9 %  Auto Neutrophil # : 6.93 K/uL  Auto Lymphocyte # : 0.70 K/uL  Auto Monocyte # : 0.62 K/uL  Auto Eosinophil # : 0.00 K/uL  Auto Basophil # : 0.02 K/uL  Auto Neutrophil % : 83.7 %  Auto Lymphocyte % : 8.4 %  Auto Monocyte % : 7.5 %  Auto Eosinophil % : 0.0 %  Auto Basophil % : 0.2 %    02-03    131<L>  |  90<L>  |  24<H>  ----------------------------<  95  3.6   |  26  |  1.37<H>    Ca    8.1<L>      03 Feb 2018 13:06    TPro  6.9  /  Alb  3.6  /  TBili  0.9  /  DBili  x   /  AST  22  /  ALT  9   /  AlkPhos  55  02-03

## 2018-02-03 NOTE — CONSULT NOTE ADULT - PROBLEM SELECTOR RECOMMENDATION 9
Patient minimally responded to metoprolol IVP  -start cardizem gtt @ 5mg/hr   -c/w fluids for hypotension management  -consider digoxin or amiodarone loading if not responding to cardizem gtt or becomes hypotensive   -TTE in AM to evaluate LV function

## 2018-02-03 NOTE — ED ADULT TRIAGE NOTE - CHIEF COMPLAINT QUOTE
pt's sister states that pt has been having abd pain for approx 1 week, seen in outside hospital ED dx with hernia, not improving.  pt not acting himself, more agitated.  PMH Autism, non verbal.  Unable to obtain temp in triage

## 2018-02-04 LAB
ANISOCYTOSIS BLD QL: SLIGHT — SIGNIFICANT CHANGE UP
APTT BLD: 28.9 SEC — SIGNIFICANT CHANGE UP (ref 27.5–37.4)
APTT BLD: 31 SEC — SIGNIFICANT CHANGE UP (ref 27.5–37.4)
BASE EXCESS BLDA CALC-SCNC: -1.1 MMOL/L — SIGNIFICANT CHANGE UP
BASE EXCESS BLDA CALC-SCNC: 0 MMOL/L — SIGNIFICANT CHANGE UP
BASE EXCESS BLDA CALC-SCNC: 0.6 MMOL/L — SIGNIFICANT CHANGE UP
BASE EXCESS BLDA CALC-SCNC: 0.8 MMOL/L — SIGNIFICANT CHANGE UP
BASOPHILS NFR SPEC: 0.9 % — SIGNIFICANT CHANGE UP (ref 0–2)
BUN SERPL-MCNC: 10 MG/DL — SIGNIFICANT CHANGE UP (ref 7–23)
BUN SERPL-MCNC: 14 MG/DL — SIGNIFICANT CHANGE UP (ref 7–23)
CA-I BLDA-SCNC: 1.07 MMOL/L — LOW (ref 1.15–1.29)
CA-I BLDA-SCNC: 1.14 MMOL/L — LOW (ref 1.15–1.29)
CA-I BLDA-SCNC: 1.16 MMOL/L — SIGNIFICANT CHANGE UP (ref 1.15–1.29)
CALCIUM SERPL-MCNC: 6.8 MG/DL — LOW (ref 8.4–10.5)
CALCIUM SERPL-MCNC: 7.3 MG/DL — LOW (ref 8.4–10.5)
CHLORIDE SERPL-SCNC: 100 MMOL/L — SIGNIFICANT CHANGE UP (ref 98–107)
CHLORIDE SERPL-SCNC: 100 MMOL/L — SIGNIFICANT CHANGE UP (ref 98–107)
CO2 SERPL-SCNC: 24 MMOL/L — SIGNIFICANT CHANGE UP (ref 22–31)
CO2 SERPL-SCNC: 26 MMOL/L — SIGNIFICANT CHANGE UP (ref 22–31)
CREAT SERPL-MCNC: 0.74 MG/DL — SIGNIFICANT CHANGE UP (ref 0.5–1.3)
CREAT SERPL-MCNC: 0.77 MG/DL — SIGNIFICANT CHANGE UP (ref 0.5–1.3)
EOSINOPHIL NFR FLD: 1.8 % — SIGNIFICANT CHANGE UP (ref 0–6)
GIANT PLATELETS BLD QL SMEAR: PRESENT — SIGNIFICANT CHANGE UP
GLUCOSE BLDA-MCNC: 102 MG/DL — HIGH (ref 70–99)
GLUCOSE BLDA-MCNC: 104 MG/DL — HIGH (ref 70–99)
GLUCOSE BLDA-MCNC: 96 MG/DL — SIGNIFICANT CHANGE UP (ref 70–99)
GLUCOSE SERPL-MCNC: 103 MG/DL — HIGH (ref 70–99)
GLUCOSE SERPL-MCNC: 99 MG/DL — SIGNIFICANT CHANGE UP (ref 70–99)
HCO3 BLDA-SCNC: 23 MMOL/L — SIGNIFICANT CHANGE UP (ref 22–26)
HCO3 BLDA-SCNC: 24 MMOL/L — SIGNIFICANT CHANGE UP (ref 22–26)
HCO3 BLDA-SCNC: 25 MMOL/L — SIGNIFICANT CHANGE UP (ref 22–26)
HCO3 BLDA-SCNC: 25 MMOL/L — SIGNIFICANT CHANGE UP (ref 22–26)
HCT VFR BLD CALC: 30.1 % — LOW (ref 39–50)
HCT VFR BLD CALC: 34.8 % — LOW (ref 39–50)
HCT VFR BLDA CALC: 31.5 % — LOW (ref 39–51)
HCT VFR BLDA CALC: 33 % — LOW (ref 39–51)
HCT VFR BLDA CALC: 34.9 % — LOW (ref 39–51)
HGB BLD-MCNC: 11.4 G/DL — LOW (ref 13–17)
HGB BLD-MCNC: 9.9 G/DL — LOW (ref 13–17)
HGB BLDA-MCNC: 10.2 G/DL — LOW (ref 13–17)
HGB BLDA-MCNC: 10.7 G/DL — LOW (ref 13–17)
HGB BLDA-MCNC: 11.3 G/DL — LOW (ref 13–17)
INR BLD: 1.41 — HIGH (ref 0.88–1.17)
INR BLD: 1.54 — HIGH (ref 0.88–1.17)
LACTATE BLDA-SCNC: 0.6 MMOL/L — SIGNIFICANT CHANGE UP (ref 0.5–2)
LACTATE BLDA-SCNC: 0.7 MMOL/L — SIGNIFICANT CHANGE UP (ref 0.5–2)
LACTATE BLDA-SCNC: 0.8 MMOL/L — SIGNIFICANT CHANGE UP (ref 0.5–2)
LYMPHOCYTES NFR SPEC AUTO: 7.9 % — LOW (ref 13–44)
MAGNESIUM SERPL-MCNC: 1.3 MG/DL — LOW (ref 1.6–2.6)
MAGNESIUM SERPL-MCNC: 2.5 MG/DL — SIGNIFICANT CHANGE UP (ref 1.6–2.6)
MCHC RBC-ENTMCNC: 28.2 PG — SIGNIFICANT CHANGE UP (ref 27–34)
MCHC RBC-ENTMCNC: 28.3 PG — SIGNIFICANT CHANGE UP (ref 27–34)
MCHC RBC-ENTMCNC: 32.8 % — SIGNIFICANT CHANGE UP (ref 32–36)
MCHC RBC-ENTMCNC: 32.9 % — SIGNIFICANT CHANGE UP (ref 32–36)
MCV RBC AUTO: 85.8 FL — SIGNIFICANT CHANGE UP (ref 80–100)
MCV RBC AUTO: 86.4 FL — SIGNIFICANT CHANGE UP (ref 80–100)
METAMYELOCYTES # FLD: 1.8 % — HIGH (ref 0–1)
MONOCYTES NFR BLD: 15 % — HIGH (ref 2–9)
NEUTROPHIL AB SER-ACNC: 36.3 % — LOW (ref 43–77)
NEUTS BAND # BLD: 36.3 % — HIGH (ref 0–6)
NRBC # FLD: 0 — SIGNIFICANT CHANGE UP
NRBC # FLD: 0 — SIGNIFICANT CHANGE UP
OVALOCYTES BLD QL SMEAR: SLIGHT — SIGNIFICANT CHANGE UP
PCO2 BLDA: 43 MMHG — SIGNIFICANT CHANGE UP (ref 35–48)
PCO2 BLDA: 49 MMHG — HIGH (ref 35–48)
PCO2 BLDA: 50 MMHG — HIGH (ref 35–48)
PCO2 BLDA: 51 MMHG — HIGH (ref 35–48)
PH BLDA: 7.31 PH — LOW (ref 7.35–7.45)
PH BLDA: 7.32 PH — LOW (ref 7.35–7.45)
PH BLDA: 7.34 PH — LOW (ref 7.35–7.45)
PH BLDA: 7.39 PH — SIGNIFICANT CHANGE UP (ref 7.35–7.45)
PHOSPHATE SERPL-MCNC: 3.1 MG/DL — SIGNIFICANT CHANGE UP (ref 2.5–4.5)
PHOSPHATE SERPL-MCNC: 3.2 MG/DL — SIGNIFICANT CHANGE UP (ref 2.5–4.5)
PLATELET # BLD AUTO: 183 K/UL — SIGNIFICANT CHANGE UP (ref 150–400)
PLATELET # BLD AUTO: 255 K/UL — SIGNIFICANT CHANGE UP (ref 150–400)
PLATELET COUNT - ESTIMATE: NORMAL — SIGNIFICANT CHANGE UP
PMV BLD: 10.6 FL — SIGNIFICANT CHANGE UP (ref 7–13)
PMV BLD: 10.6 FL — SIGNIFICANT CHANGE UP (ref 7–13)
PO2 BLDA: 101 MMHG — SIGNIFICANT CHANGE UP (ref 83–108)
PO2 BLDA: 123 MMHG — HIGH (ref 83–108)
PO2 BLDA: 140 MMHG — HIGH (ref 83–108)
PO2 BLDA: 88 MMHG — SIGNIFICANT CHANGE UP (ref 83–108)
POTASSIUM BLDA-SCNC: 3.7 MMOL/L — SIGNIFICANT CHANGE UP (ref 3.4–4.5)
POTASSIUM BLDA-SCNC: 3.8 MMOL/L — SIGNIFICANT CHANGE UP (ref 3.4–4.5)
POTASSIUM BLDA-SCNC: 4 MMOL/L — SIGNIFICANT CHANGE UP (ref 3.4–4.5)
POTASSIUM SERPL-MCNC: 4.4 MMOL/L — SIGNIFICANT CHANGE UP (ref 3.5–5.3)
POTASSIUM SERPL-MCNC: 4.4 MMOL/L — SIGNIFICANT CHANGE UP (ref 3.5–5.3)
POTASSIUM SERPL-SCNC: 4.4 MMOL/L — SIGNIFICANT CHANGE UP (ref 3.5–5.3)
POTASSIUM SERPL-SCNC: 4.4 MMOL/L — SIGNIFICANT CHANGE UP (ref 3.5–5.3)
PROTHROM AB SERPL-ACNC: 16.3 SEC — HIGH (ref 9.8–13.1)
PROTHROM AB SERPL-ACNC: 17.9 SEC — HIGH (ref 9.8–13.1)
RBC # BLD: 3.51 M/UL — LOW (ref 4.2–5.8)
RBC # BLD: 4.03 M/UL — LOW (ref 4.2–5.8)
RBC # FLD: 13.5 % — SIGNIFICANT CHANGE UP (ref 10.3–14.5)
RBC # FLD: 13.5 % — SIGNIFICANT CHANGE UP (ref 10.3–14.5)
SAO2 % BLDA: 96.2 % — SIGNIFICANT CHANGE UP (ref 95–99)
SAO2 % BLDA: 96.9 % — SIGNIFICANT CHANGE UP (ref 95–99)
SAO2 % BLDA: 98.5 % — SIGNIFICANT CHANGE UP (ref 95–99)
SAO2 % BLDA: 98.5 % — SIGNIFICANT CHANGE UP (ref 95–99)
SMUDGE CELLS # BLD: PRESENT — SIGNIFICANT CHANGE UP
SODIUM BLDA-SCNC: 126 MMOL/L — LOW (ref 136–146)
SODIUM BLDA-SCNC: 130 MMOL/L — LOW (ref 136–146)
SODIUM BLDA-SCNC: 130 MMOL/L — LOW (ref 136–146)
SODIUM SERPL-SCNC: 133 MMOL/L — LOW (ref 135–145)
SODIUM SERPL-SCNC: 134 MMOL/L — LOW (ref 135–145)
WBC # BLD: 4.29 K/UL — SIGNIFICANT CHANGE UP (ref 3.8–10.5)
WBC # BLD: 6.47 K/UL — SIGNIFICANT CHANGE UP (ref 3.8–10.5)
WBC # FLD AUTO: 4.29 K/UL — SIGNIFICANT CHANGE UP (ref 3.8–10.5)
WBC # FLD AUTO: 6.47 K/UL — SIGNIFICANT CHANGE UP (ref 3.8–10.5)

## 2018-02-04 PROCEDURE — 99233 SBSQ HOSP IP/OBS HIGH 50: CPT

## 2018-02-04 PROCEDURE — 93010 ELECTROCARDIOGRAM REPORT: CPT

## 2018-02-04 PROCEDURE — 36556 INSERT NON-TUNNEL CV CATH: CPT

## 2018-02-04 PROCEDURE — 99291 CRITICAL CARE FIRST HOUR: CPT | Mod: 25

## 2018-02-04 PROCEDURE — 71045 X-RAY EXAM CHEST 1 VIEW: CPT | Mod: 26,76

## 2018-02-04 PROCEDURE — 36620 INSERTION CATHETER ARTERY: CPT

## 2018-02-04 PROCEDURE — 99292 CRITICAL CARE ADDL 30 MIN: CPT | Mod: 25

## 2018-02-04 PROCEDURE — 93306 TTE W/DOPPLER COMPLETE: CPT | Mod: 26

## 2018-02-04 RX ORDER — NOREPINEPHRINE BITARTRATE/D5W 8 MG/250ML
0.05 PLASTIC BAG, INJECTION (ML) INTRAVENOUS
Qty: 16 | Refills: 0 | Status: DISCONTINUED | OUTPATIENT
Start: 2018-02-04 | End: 2018-02-04

## 2018-02-04 RX ORDER — SODIUM CHLORIDE 9 MG/ML
1000 INJECTION INTRAMUSCULAR; INTRAVENOUS; SUBCUTANEOUS
Qty: 0 | Refills: 0 | Status: DISCONTINUED | OUTPATIENT
Start: 2018-02-04 | End: 2018-02-06

## 2018-02-04 RX ORDER — VALPROIC ACID (AS SODIUM SALT) 250 MG/5ML
250 SOLUTION, ORAL ORAL EVERY 12 HOURS
Qty: 0 | Refills: 0 | Status: DISCONTINUED | OUTPATIENT
Start: 2018-02-04 | End: 2018-02-07

## 2018-02-04 RX ORDER — AMIODARONE HYDROCHLORIDE 400 MG/1
0.5 TABLET ORAL
Qty: 450 | Refills: 0 | Status: DISCONTINUED | OUTPATIENT
Start: 2018-02-04 | End: 2018-02-05

## 2018-02-04 RX ORDER — PHENYLEPHRINE HYDROCHLORIDE 10 MG/ML
0.5 INJECTION INTRAVENOUS
Qty: 80 | Refills: 0 | Status: DISCONTINUED | OUTPATIENT
Start: 2018-02-04 | End: 2018-02-06

## 2018-02-04 RX ORDER — SODIUM CHLORIDE 9 MG/ML
500 INJECTION INTRAMUSCULAR; INTRAVENOUS; SUBCUTANEOUS ONCE
Qty: 0 | Refills: 0 | Status: COMPLETED | OUTPATIENT
Start: 2018-02-04 | End: 2018-02-04

## 2018-02-04 RX ORDER — CALCIUM GLUCONATE 100 MG/ML
1 VIAL (ML) INTRAVENOUS ONCE
Qty: 0 | Refills: 0 | Status: COMPLETED | OUTPATIENT
Start: 2018-02-04 | End: 2018-02-04

## 2018-02-04 RX ORDER — SODIUM CHLORIDE 9 MG/ML
1000 INJECTION, SOLUTION INTRAVENOUS ONCE
Qty: 0 | Refills: 0 | Status: COMPLETED | OUTPATIENT
Start: 2018-02-04 | End: 2018-02-04

## 2018-02-04 RX ORDER — MAGNESIUM SULFATE 500 MG/ML
2 VIAL (ML) INJECTION
Qty: 0 | Refills: 0 | Status: COMPLETED | OUTPATIENT
Start: 2018-02-04 | End: 2018-02-04

## 2018-02-04 RX ORDER — ACETAMINOPHEN 500 MG
1000 TABLET ORAL ONCE
Qty: 0 | Refills: 0 | Status: COMPLETED | OUTPATIENT
Start: 2018-02-04 | End: 2018-02-04

## 2018-02-04 RX ORDER — ACETAMINOPHEN 500 MG
1000 TABLET ORAL ONCE
Qty: 0 | Refills: 0 | Status: COMPLETED | OUTPATIENT
Start: 2018-02-05 | End: 2018-02-05

## 2018-02-04 RX ORDER — ONDANSETRON 8 MG/1
4 TABLET, FILM COATED ORAL ONCE
Qty: 0 | Refills: 0 | Status: COMPLETED | OUTPATIENT
Start: 2018-02-04 | End: 2018-02-04

## 2018-02-04 RX ORDER — VANCOMYCIN HCL 1 G
1000 VIAL (EA) INTRAVENOUS EVERY 12 HOURS
Qty: 0 | Refills: 0 | Status: DISCONTINUED | OUTPATIENT
Start: 2018-02-04 | End: 2018-02-04

## 2018-02-04 RX ORDER — MAGNESIUM SULFATE 500 MG/ML
3 VIAL (ML) INJECTION ONCE
Qty: 0 | Refills: 0 | Status: DISCONTINUED | OUTPATIENT
Start: 2018-02-04 | End: 2018-02-04

## 2018-02-04 RX ORDER — AMIODARONE HYDROCHLORIDE 400 MG/1
150 TABLET ORAL ONCE
Qty: 0 | Refills: 0 | Status: COMPLETED | OUTPATIENT
Start: 2018-02-04 | End: 2018-02-04

## 2018-02-04 RX ORDER — SODIUM CHLORIDE 9 MG/ML
1000 INJECTION INTRAMUSCULAR; INTRAVENOUS; SUBCUTANEOUS ONCE
Qty: 0 | Refills: 0 | Status: COMPLETED | OUTPATIENT
Start: 2018-02-04 | End: 2018-02-04

## 2018-02-04 RX ORDER — MAGNESIUM SULFATE 500 MG/ML
2 VIAL (ML) INJECTION DAILY
Qty: 0 | Refills: 0 | Status: DISCONTINUED | OUTPATIENT
Start: 2018-02-05 | End: 2018-02-05

## 2018-02-04 RX ORDER — PHENYLEPHRINE HYDROCHLORIDE 10 MG/ML
0.5 INJECTION INTRAVENOUS
Qty: 160 | Refills: 0 | Status: DISCONTINUED | OUTPATIENT
Start: 2018-02-04 | End: 2018-02-04

## 2018-02-04 RX ORDER — MAGNESIUM SULFATE 500 MG/ML
2 VIAL (ML) INJECTION ONCE
Qty: 0 | Refills: 0 | Status: COMPLETED | OUTPATIENT
Start: 2018-02-04 | End: 2018-02-04

## 2018-02-04 RX ORDER — PHENYLEPHRINE HYDROCHLORIDE 10 MG/ML
0.5 INJECTION INTRAVENOUS
Qty: 80 | Refills: 0 | Status: DISCONTINUED | OUTPATIENT
Start: 2018-02-04 | End: 2018-02-04

## 2018-02-04 RX ORDER — AMIODARONE HYDROCHLORIDE 400 MG/1
1 TABLET ORAL
Qty: 450 | Refills: 0 | Status: DISCONTINUED | OUTPATIENT
Start: 2018-02-04 | End: 2018-02-05

## 2018-02-04 RX ADMIN — MORPHINE SULFATE 4 MILLIGRAM(S): 50 CAPSULE, EXTENDED RELEASE ORAL at 05:52

## 2018-02-04 RX ADMIN — ENOXAPARIN SODIUM 40 MILLIGRAM(S): 100 INJECTION SUBCUTANEOUS at 06:53

## 2018-02-04 RX ADMIN — MORPHINE SULFATE 4 MILLIGRAM(S): 50 CAPSULE, EXTENDED RELEASE ORAL at 06:30

## 2018-02-04 RX ADMIN — MORPHINE SULFATE 2 MILLIGRAM(S): 50 CAPSULE, EXTENDED RELEASE ORAL at 01:15

## 2018-02-04 RX ADMIN — Medication 1000 MILLIGRAM(S): at 01:35

## 2018-02-04 RX ADMIN — Medication 50 GRAM(S): at 03:14

## 2018-02-04 RX ADMIN — Medication 1000 MILLIGRAM(S): at 20:30

## 2018-02-04 RX ADMIN — SODIUM CHLORIDE 100 MILLILITER(S): 9 INJECTION INTRAMUSCULAR; INTRAVENOUS; SUBCUTANEOUS at 20:00

## 2018-02-04 RX ADMIN — PIPERACILLIN AND TAZOBACTAM 25 GRAM(S): 4; .5 INJECTION, POWDER, LYOPHILIZED, FOR SOLUTION INTRAVENOUS at 17:25

## 2018-02-04 RX ADMIN — ONDANSETRON 4 MILLIGRAM(S): 8 TABLET, FILM COATED ORAL at 05:52

## 2018-02-04 RX ADMIN — AMIODARONE HYDROCHLORIDE 16.67 MG/MIN: 400 TABLET ORAL at 19:56

## 2018-02-04 RX ADMIN — Medication 250 MILLIGRAM(S): at 07:35

## 2018-02-04 RX ADMIN — Medication 400 MILLIGRAM(S): at 13:38

## 2018-02-04 RX ADMIN — PIPERACILLIN AND TAZOBACTAM 25 GRAM(S): 4; .5 INJECTION, POWDER, LYOPHILIZED, FOR SOLUTION INTRAVENOUS at 07:49

## 2018-02-04 RX ADMIN — Medication 1000 MILLIGRAM(S): at 08:13

## 2018-02-04 RX ADMIN — SODIUM CHLORIDE 1000 MILLILITER(S): 9 INJECTION, SOLUTION INTRAVENOUS at 00:30

## 2018-02-04 RX ADMIN — Medication 50 GRAM(S): at 04:17

## 2018-02-04 RX ADMIN — AMIODARONE HYDROCHLORIDE 618 MILLIGRAM(S): 400 TABLET ORAL at 06:56

## 2018-02-04 RX ADMIN — Medication 400 MILLIGRAM(S): at 07:50

## 2018-02-04 RX ADMIN — Medication 400 MILLIGRAM(S): at 01:04

## 2018-02-04 RX ADMIN — Medication 50 GRAM(S): at 05:32

## 2018-02-04 RX ADMIN — AMIODARONE HYDROCHLORIDE 16.67 MG/MIN: 400 TABLET ORAL at 16:59

## 2018-02-04 RX ADMIN — SODIUM CHLORIDE 150 MILLILITER(S): 9 INJECTION INTRAMUSCULAR; INTRAVENOUS; SUBCUTANEOUS at 07:50

## 2018-02-04 RX ADMIN — Medication 26.25 MILLIGRAM(S): at 19:56

## 2018-02-04 RX ADMIN — PHENYLEPHRINE HYDROCHLORIDE 15.38 MICROGRAM(S)/KG/MIN: 10 INJECTION INTRAVENOUS at 23:20

## 2018-02-04 RX ADMIN — SODIUM CHLORIDE 500 MILLILITER(S): 9 INJECTION INTRAMUSCULAR; INTRAVENOUS; SUBCUTANEOUS at 05:54

## 2018-02-04 RX ADMIN — AMIODARONE HYDROCHLORIDE 33.33 MG/MIN: 400 TABLET ORAL at 10:48

## 2018-02-04 RX ADMIN — Medication 1000 MILLIGRAM(S): at 14:00

## 2018-02-04 RX ADMIN — MORPHINE SULFATE 2 MILLIGRAM(S): 50 CAPSULE, EXTENDED RELEASE ORAL at 00:55

## 2018-02-04 RX ADMIN — SODIUM CHLORIDE 1000 MILLILITER(S): 9 INJECTION INTRAMUSCULAR; INTRAVENOUS; SUBCUTANEOUS at 04:27

## 2018-02-04 RX ADMIN — Medication 400 MILLIGRAM(S): at 19:56

## 2018-02-04 RX ADMIN — Medication 200 GRAM(S): at 05:51

## 2018-02-04 RX ADMIN — PHENYLEPHRINE HYDROCHLORIDE 15.38 MICROGRAM(S)/KG/MIN: 10 INJECTION INTRAVENOUS at 07:50

## 2018-02-04 RX ADMIN — Medication 26.25 MILLIGRAM(S): at 08:26

## 2018-02-04 RX ADMIN — SODIUM CHLORIDE 150 MILLILITER(S): 9 INJECTION INTRAMUSCULAR; INTRAVENOUS; SUBCUTANEOUS at 04:31

## 2018-02-04 NOTE — PROCEDURE NOTE - NSPOSTPRCRAD_GEN_A_CORE
awaiting repeat xray/post-procedure radiography performed/central line located in the superior vena cava

## 2018-02-04 NOTE — PROGRESS NOTE ADULT - ASSESSMENT
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

## 2018-02-04 NOTE — PROCEDURE NOTE - NSPROCDETAILS_GEN_ALL_CORE
sutured in place/all materials/supplies accounted for at end of procedure/location identified, draped/prepped, sterile technique used, needle inserted/introduced/connected to a pressurized flush line/hemostasis with direct pressure, dressing applied/Seldinger technique/ultrasound guidance/positive blood return obtained via catheter
sterile dressing applied/sterile technique, catheter placed/guidewire recovered/lumen(s) aspirated and flushed/ultrasound guidance

## 2018-02-04 NOTE — CONSULT NOTE ADULT - SUBJECTIVE AND OBJECTIVE BOX
SICU Consultation Note  =====================================================  HPI: 33M hx of HTN, autism (non verbal at baseline), paraxosmal Afib and chronic umbilical hernia presented   to ED on 2/3/18 for Abd pain, concerned for strangulated vs. incarcirnated hernia, brought to OR, found   to have perforated terminal ileium due to chicken bone with pus s/p laporatomy ileocecectomy. On POD1,   pt was found to be hypotensive (), with Afib. Pt received a total of 20mg Lopressor and 10mg   Dilt post-op for rate control. Card was consulted for Afib with RVR, with consideration to start on     Dilt drip. While in PACU, pt BP did not improve despite 2L NS and 250ml Albumin. Otherwise, pt's     afebrile. SICU was consulted for low BP with concern for sepsis. VS: 86/52, RR 19, Pulse 121. Pt's     aferibe, pending post op labs.     Surgery Information  Laparotomy ileocecectomy with pus      Case Duration: 	EBL: 50cc	      HISTORY  33y Male  HPI:  33M PMHx autism (non-verbal at baseline), HTN, chronic umbilical hernia presented to [facility] ED the afternoon of 2/3/18 for perceived abd pain. Unknown how long hernia present, or whether congenital; never able to reduce. Recently presented to OSH 3 days prior to presentation ([facility] ED 1/31) complaining of 1 episode of vomiting, and sent home w/ dx of viral gastroenteritis. Since that episode patient has not vomited, but family have perceived that pt appears more uncomfortable in recent few days and has been hiccuping. His umbilical area also appeared to grow in size & the overlying skin became red. Recent URI sx including cough that have largely resolved. Denies fever. last BM the morning of presentation and in ED. Patient hospitalized ~2 years ago at [facility] (after eating a banana whole, including peel) for esophageal tear (no operative intervention) and was discharged on liquid then puree/mush diet for ~6months. Patient reportedly will eat foreign objects within reach.     In ED VS: T37.1, P107, /99, RR18, SpO2 95% on RA. Labs notable for 83% neutrophils, Na 131, Cl 90, BUN 24, Cr 1.4, venous lactate 1.6. CT showing incarcerated umbilical hernia containing bowel, possibly ischemic changes and foreign objects in hernia & R colon. Received 2L NS, vanc/zosyn. General Surgery Consulted. (03 Feb 2018 18:23)    Allergies:   PAST MEDICAL & SURGICAL HISTORY:  Hypertension  Autism spectrum disorder  No significant past surgical history    FAMILY HISTORY:  No pertinent family history in first degree relatives      SOCIAL HISTORY:  N/A    ADVANCE DIRECTIVES: Presumed Full Code      REVIEW OF SYSTEMS:    See HPI. Otherwise, 10 point ROS done and otherwise negative    HOME MEDICATIONS:  ***    CURRENT MEDICATIONS:   --------------------------------------------------------------------------------------  Neurologic Medications  acetaminophen  IVPB. 1000 milliGRAM(s) IV Intermittent once  acetaminophen  IVPB. 1000 milliGRAM(s) IV Intermittent once  HYDROmorphone  Injectable 0.5 milliGRAM(s) IV Push every 10 minutes PRN Moderate Pain  morphine  - Injectable 2 milliGRAM(s) IV Push every 6 hours PRN Moderate Pain (4 - 6)  morphine  - Injectable 4 milliGRAM(s) IV Push every 6 hours PRN Severe Pain (7 - 10)  risperiDONE   Tablet 1 milliGRAM(s) Oral two times a day  traZODone 150 milliGRAM(s) Oral at bedtime  valproic  acid Syrup 250 milliGRAM(s) Oral two times a day    Respiratory Medications    Cardiovascular Medications  dronedarone 400 milliGRAM(s) Oral two times a day  phenylephrine    Infusion 0.5 MICROgram(s)/kG/Min IV Continuous <Continuous>    Gastrointestinal Medications  calcium gluconate IVPB 1 Gram(s) IV Intermittent once  sodium chloride 0.9%. 1000 milliLiter(s) IV Continuous <Continuous>    Genitourinary Medications    Hematologic/Oncologic Medications  enoxaparin Injectable 40 milliGRAM(s) SubCutaneous every 24 hours    Antimicrobial/Immunologic Medications  piperacillin/tazobactam IVPB. 3.375 Gram(s) IV Intermittent every 8 hours    Endocrine/Metabolic Medications    Topical/Other Medications    --------------------------------------------------------------------------------------    VITAL SIGNS, INS/OUTS (last 24 hours):  --------------------------------------------------------------------------------------  ((Insert SICU Vitals / Is+Os here)) ***  --------------------------------------------------------------------------------------    EXAM  NEUROLOGY  RASS:   	GCS:    Exam: Normal, NAD, alert, oriented x 3, no focal deficits. ***    HEENT  Exam: Normocephalic, atraumatic.  EOMI ***    RESPIRATORY  Exam: Lungs clear to auscultation, Normal expansion/effort.  ***  Mechanical Ventilation:     CARDIOVASCULAR  Exam: S1, S2.  Regular rate and rhythm.  Peripheral edema  ***    GI/NUTRITION  Exam: Abdomen soft, Non-tender, Non-distended.  ***  Wound:   ***  Current Diet:  NPO ***    VASCULAR  Exam: Extremities warm, pink, well-perfused.  ***    MUSCULOSKELETAL  Exam: All extremities moving spontaneously without limitations.  ***    SKIN:  Exam: Good skin turgor, no skin breakdown.  ***    METABOLIC/FLUIDS/ELECTROLYTES  calcium gluconate IVPB 1 Gram(s) IV Intermittent once  sodium chloride 0.9%. 1000 milliLiter(s) IV Continuous <Continuous>      HEMATOLOGIC  [x] DVT Prophylaxis: enoxaparin Injectable 40 milliGRAM(s) SubCutaneous every 24 hours    Transfusions:	[] PRBC	[] Platelets		[] FFP	[] Cryoprecipitate    INFECTIOUS DISEASE  Antimicrobials/Immunologic Medications:  piperacillin/tazobactam IVPB. 3.375 Gram(s) IV Intermittent every 8 hours    Day #  	of    ***    Tubes/Lines/Drains  ***  [x] Peripheral IV  [] Central Venous Line     	[] R	[] L	[] IJ	[] Fem	[] SC	Date Placed:   [] Arterial Line		[] R	[] L	[] Fem	[] Rad	[] Ax	Date Placed:   [] PICC:         	[] Midline		[] Mediport  [] Urinary Catheter		Date Placed:     LABS  --------------------------------------------------------------------------------------  ((Insert SICU Labs here))***  --------------------------------------------------------------------------------------    OTHER LABS    IMAGING RESULTS  Echo:   CT:   Xray:     ASSESSMENT:  33y Male ***    PLAN:  ***  Neurologic:   Respiratory:   Cardiovascular:   Gastrointestinal/Nutrition:   Renal/Genitourinary:   Hematologic:   Infectious Disease:   Tubes/Lines/Drains:   Endocrine:   Disposition:     --------------------------------------------------------------------------------------    Critical Care Diagnoses: SICU Consultation Note  =====================================================  HPI: 33M hx of HTN, autism (non verbal at baseline), paraxosmal Afib and chronic umbilical hernia presented   to ED on 2/3/18 for Abd pain, concerned for strangulated vs. incarcirnated hernia, brought to OR, found   to have perforated terminal ileium due to chicken bone with pus s/p laporatomy ileocecectomy. On POD1,   pt was found to be hypotensive (), with Afib. Pt received a total of 20mg Lopressor and 10mg   Dilt post-op for rate control. Card was consulted for Afib with RVR, with consideration to start on     Dilt drip. While in PACU, pt BP did not improve despite 2L NS and 250ml Albumin. Otherwise, pt's     afebrile. SICU was consulted for low BP with concern for sepsis. VS: 86/52, RR 19, Pulse 121. Pt's     aferibe, pending post op labs.     Surgery Information  Laparotomy ileocecectomy with pus      Case Duration: 	EBL: 50cc	      HISTORY  33y Male  HPI:  33M PMHx autism (non-verbal at baseline), HTN, chronic umbilical hernia presented to [facility] ED the afternoon of 2/3/18 for perceived abd pain. Unknown how long hernia present, or whether congenital; never able to reduce. Recently presented to OSH 3 days prior to presentation ([facility] ED 1/31) complaining of 1 episode of vomiting, and sent home w/ dx of viral gastroenteritis. Since that episode patient has not vomited, but family have perceived that pt appears more uncomfortable in recent few days and has been hiccuping. His umbilical area also appeared to grow in size & the overlying skin became red. Recent URI sx including cough that have largely resolved. Denies fever. last BM the morning of presentation and in ED. Patient hospitalized ~2 years ago at [facility] (after eating a banana whole, including peel) for esophageal tear (no operative intervention) and was discharged on liquid then puree/mush diet for ~6months. Patient reportedly will eat foreign objects within reach.     In ED VS: T37.1, P107, /99, RR18, SpO2 95% on RA. Labs notable for 83% neutrophils, Na 131, Cl 90, BUN 24, Cr 1.4, venous lactate 1.6. CT showing incarcerated umbilical hernia containing bowel, possibly ischemic changes and foreign objects in hernia & R colon. Received 2L NS, vanc/zosyn. General Surgery Consulted. (03 Feb 2018 18:23)    Allergies:   PAST MEDICAL & SURGICAL HISTORY:  Hypertension  Autism spectrum disorder  No significant past surgical history    FAMILY HISTORY:  No pertinent family history in first degree relatives      SOCIAL HISTORY:  N/A    ADVANCE DIRECTIVES: Presumed Full Code      REVIEW OF SYSTEMS:    See HPI. Otherwise, 10 point ROS done and otherwise negative      CURRENT MEDICATIONS:   --------------------------------------------------------------------------------------  Neurologic Medications  acetaminophen  IVPB. 1000 milliGRAM(s) IV Intermittent once  acetaminophen  IVPB. 1000 milliGRAM(s) IV Intermittent once  HYDROmorphone  Injectable 0.5 milliGRAM(s) IV Push every 10 minutes PRN Moderate Pain  morphine  - Injectable 2 milliGRAM(s) IV Push every 6 hours PRN Moderate Pain (4 - 6)  morphine  - Injectable 4 milliGRAM(s) IV Push every 6 hours PRN Severe Pain (7 - 10)  risperiDONE   Tablet 1 milliGRAM(s) Oral two times a day  traZODone 150 milliGRAM(s) Oral at bedtime  valproic  acid Syrup 250 milliGRAM(s) Oral two times a day    Respiratory Medications    Cardiovascular Medications  dronedarone 400 milliGRAM(s) Oral two times a day  phenylephrine    Infusion 0.5 MICROgram(s)/kG/Min IV Continuous <Continuous>    Gastrointestinal Medications  calcium gluconate IVPB 1 Gram(s) IV Intermittent once  sodium chloride 0.9%. 1000 milliLiter(s) IV Continuous <Continuous>    Genitourinary Medications    Hematologic/Oncologic Medications  enoxaparin Injectable 40 milliGRAM(s) SubCutaneous every 24 hours    Antimicrobial/Immunologic Medications  piperacillin/tazobactam IVPB. 3.375 Gram(s) IV Intermittent every 8 hours    Endocrine/Metabolic Medications    Topical/Other Medications    --------------------------------------------------------------------------------------    VITAL SIGNS, INS/OUTS (last 24 hours):  --------------------------------------------------------------------------------------  T(C): 36.6 (02-04-18 @ 02:00), Max: 37.1 (02-03-18 @ 16:02)  HR: 119 (02-04-18 @ 02:00) (76 - 140)  BP: 100/52 (02-04-18 @ 02:00) (81/43 - 129/99)  BP(mean): 62 (02-04-18 @ 02:00) (56 - 64)  ABP: --  ABP(mean): --  RR: 18 (02-04-18 @ 02:00) (13 - 28)  SpO2: 100% (02-04-18 @ 02:00) (94% - 100%)  Wt(kg): --  CVP(mm Hg): --  CI: --  CAPILLARY BLOOD GLUCOSE       N/A      02-03 @ 07:01  -  02-04 @ 02:43  --------------------------------------------------------  IN:    IV PiggyBack: 200 mL    Lactated Ringers IV Bolus: 1000 mL    sodium chloride 0.9%: 700 mL    Sodium Chloride 0.9% IV Bolus: 2000 mL  Total IN: 3900 mL    OUT:    Indwelling Catheter - Urethral: 1020 mL    Nasoenteral Tube: 20 mL  Total OUT: 1040 mL    Total NET: 2860 mL        --------------------------------------------------------------------------------------    EXAM  NEUROLOGY  Exam: Non verbal, difficulty to assess due to Autism     HEENT  Exam: Normocephalic, atraumatic.  EOMI     RESPIRATORY  Exam: Lungs clear to auscultation, Normal expansion/effort.        CARDIOVASCULAR  Exam: S1, S2.  Irregular heart rate. Tachycardia.     GI/NUTRITION  Exam: Abdomen soft, Non-tender, Non-distended.     Wound:  Surgical dressing over the abdomen, no sx of infection.     Current Diet:  NPO     VASCULAR  Exam: Extremities warm, pink, well-perfused.     MUSCULOSKELETAL  Exam: All extremities moving spontaneously without limitations.     SKIN:  Exam: Good skin turgor, no skin breakdown.     METABOLIC/FLUIDS/ELECTROLYTES  calcium gluconate IVPB 1 Gram(s) IV Intermittent once  sodium chloride 0.9%. 1000 milliLiter(s) IV Continuous <Continuous>      HEMATOLOGIC  [x] DVT Prophylaxis: enoxaparin Injectable 40 milliGRAM(s) SubCutaneous every 24 hours    Transfusions:	[] PRBC	[] Platelets		[] FFP	[] Cryoprecipitate    INFECTIOUS DISEASE  Antimicrobials/Immunologic Medications:  piperacillin/tazobactam IVPB. 3.375 Gram(s) IV Intermittent every 8 hours      Tubes/Lines/Drains  ***  [x] Peripheral IV  [] Central Venous Line     	[] R	[] L	[] IJ	[] Fem	[] SC	Date Placed:   [] Arterial Line		[] R	[] L	[] Fem	[] Rad	[] Ax	Date Placed:   [] PICC:         	[] Midline		[] Mediport  [] Urinary Catheter		Date Placed:     LABS  --------------------------------------------------------------------------------------  CBC (02-04 @ 01:35)                          9.9<L>                   4.29    )--------------(  183        --    % Neuts, --    % Lymphs, ANC: --                              30.1<L>  CBC (02-03 @ 13:06)                          12.6<L>                   8.29    )--------------(  222        83.7<H>% Neuts, 8.4<L>% Lymphs, ANC: 6.93                            39.5      BMP (02-04 @ 01:35)       133<L>  |  100     |  14    			Ca++ --      Ca 6.8<L>       ---------------------------------( 99    		Mg 1.3<L>       4.4     |  24      |  0.77  			Ph 3.2     BMP (02-03 @ 13:06)       131<L>  |  90<L>   |  24<H> 			Ca++ --      Ca 8.1<L>       ---------------------------------( 95    		Mg --           3.6     |  26      |  1.37<H>			Ph --        LFTs (02-03 @ 13:06)      TPro 6.9 / Alb 3.6 / TBili 0.9 / DBili -- / AST 22 / ALT 9 / AlkPhos 55    Coags (02-04 @ 01:35)  aPTT 28.9 / INR 1.54<H> / PT 17.9<H>  Coags (02-03 @ 16:58)  aPTT 28.7 / INR 1.47<H> / PT 17.0<H>      ABG (02-04 @ 01:35)     7.39 / 43 / 123<H> / 25 / 0.8 / 98.5%     Lactate: 0.8     VBG (02-03 @ 16:58)     7.35 / 51 / 38 / 25 / 2.0 / 66.2%      Lactate: 1.0  VBG (02-03 @ 13:06)     7.36 / 56<H> / < 24<L> / 27 / 5.3 / 21.7<L>%      Lactate: 1.6        --------------------------------------------------------------------------------------    OTHER LABS    IMAGING RESULTS  Echo:   CT:   Xray:     ASSESSMENT:  33M s/p laporatomy ileocecectomy, on POD1, pt was found to be hypotensive (), with Afib. Pt received a total of 20mg Lopressor and 10mg   Dilt post-op for rate control. Card was consulted for Afib with RVR, with consideration to start on   Dilt drip. While in PACU, pt BP did not improve despite 2L NS and 250ml Albumin. Otherwise, pt's   afebrile. SICU was consulted for low BP with concern for sepsis. VS: 86/52, RR 19, Pulse 121. Pt's   aferibe, pending post op labs.     PLAN:  ***  Neurologic:   Respiratory:   Cardiovascular:   Gastrointestinal/Nutrition:   Renal/Genitourinary:   Hematologic:   Infectious Disease:   Tubes/Lines/Drains:   Endocrine:   Disposition:     --------------------------------------------------------------------------------------    Critical Care Diagnoses: SICU Consultation Note  =====================================================  HPI: 33M hx of HTN, autism (non verbal at baseline), paraxosmal Afib and chronic umbilical hernia presented   to ED on 2/3/18 for Abd pain, concerned for strangulated vs. incarcirnated hernia, brought to OR, found   to have perforated terminal ileium due to chicken bone with pus s/p laporatomy ileocecectomy. On POD1,   pt was found to be hypotensive (), with Afib. Pt received a total of 20mg Lopressor and 10mg   Dilt post-op for rate control. Card was consulted for Afib with RVR, with consideration to start on     Dilt drip. While in PACU, pt BP did not improve despite 2L NS and 250ml Albumin. Otherwise, pt's     afebrile. SICU was consulted for low BP with concern for sepsis. VS: 86/52, RR 19, Pulse 121. Pt's     aferibe, pending post op labs.     Surgery Information  Laparotomy ileocecectomy with pus      Case Duration: 	EBL: 50cc	      HISTORY  33y Male  HPI:  33M PMHx autism (non-verbal at baseline), HTN, chronic umbilical hernia presented to [facility] ED the afternoon of 2/3/18 for perceived abd pain. Unknown how long hernia present, or whether congenital; never able to reduce. Recently presented to OSH 3 days prior to presentation ([facility] ED 1/31) complaining of 1 episode of vomiting, and sent home w/ dx of viral gastroenteritis. Since that episode patient has not vomited, but family have perceived that pt appears more uncomfortable in recent few days and has been hiccuping. His umbilical area also appeared to grow in size & the overlying skin became red. Recent URI sx including cough that have largely resolved. Denies fever. last BM the morning of presentation and in ED. Patient hospitalized ~2 years ago at [facility] (after eating a banana whole, including peel) for esophageal tear (no operative intervention) and was discharged on liquid then puree/mush diet for ~6months. Patient reportedly will eat foreign objects within reach.     In ED VS: T37.1, P107, /99, RR18, SpO2 95% on RA. Labs notable for 83% neutrophils, Na 131, Cl 90, BUN 24, Cr 1.4, venous lactate 1.6. CT showing incarcerated umbilical hernia containing bowel, possibly ischemic changes and foreign objects in hernia & R colon. Received 2L NS, vanc/zosyn. General Surgery Consulted. (03 Feb 2018 18:23)    Allergies:   PAST MEDICAL & SURGICAL HISTORY:  Hypertension  Autism spectrum disorder  No significant past surgical history    FAMILY HISTORY:  No pertinent family history in first degree relatives      SOCIAL HISTORY:  N/A    ADVANCE DIRECTIVES: Presumed Full Code      REVIEW OF SYSTEMS:    See HPI. Otherwise, 10 point ROS done and otherwise negative      CURRENT MEDICATIONS:   --------------------------------------------------------------------------------------  Neurologic Medications  acetaminophen  IVPB. 1000 milliGRAM(s) IV Intermittent once  acetaminophen  IVPB. 1000 milliGRAM(s) IV Intermittent once  HYDROmorphone  Injectable 0.5 milliGRAM(s) IV Push every 10 minutes PRN Moderate Pain  morphine  - Injectable 2 milliGRAM(s) IV Push every 6 hours PRN Moderate Pain (4 - 6)  morphine  - Injectable 4 milliGRAM(s) IV Push every 6 hours PRN Severe Pain (7 - 10)  risperiDONE   Tablet 1 milliGRAM(s) Oral two times a day  traZODone 150 milliGRAM(s) Oral at bedtime  valproic  acid Syrup 250 milliGRAM(s) Oral two times a day    Respiratory Medications    Cardiovascular Medications  dronedarone 400 milliGRAM(s) Oral two times a day  phenylephrine    Infusion 0.5 MICROgram(s)/kG/Min IV Continuous <Continuous>    Gastrointestinal Medications  calcium gluconate IVPB 1 Gram(s) IV Intermittent once  sodium chloride 0.9%. 1000 milliLiter(s) IV Continuous <Continuous>    Genitourinary Medications    Hematologic/Oncologic Medications  enoxaparin Injectable 40 milliGRAM(s) SubCutaneous every 24 hours    Antimicrobial/Immunologic Medications  piperacillin/tazobactam IVPB. 3.375 Gram(s) IV Intermittent every 8 hours    Endocrine/Metabolic Medications    Topical/Other Medications    --------------------------------------------------------------------------------------    VITAL SIGNS, INS/OUTS (last 24 hours):  --------------------------------------------------------------------------------------  T(C): 36.6 (02-04-18 @ 02:00), Max: 37.1 (02-03-18 @ 16:02)  HR: 119 (02-04-18 @ 02:00) (76 - 140)  BP: 100/52 (02-04-18 @ 02:00) (81/43 - 129/99)  BP(mean): 62 (02-04-18 @ 02:00) (56 - 64)  ABP: --  ABP(mean): --  RR: 18 (02-04-18 @ 02:00) (13 - 28)  SpO2: 100% (02-04-18 @ 02:00) (94% - 100%)  Wt(kg): --  CVP(mm Hg): --  CI: --  CAPILLARY BLOOD GLUCOSE       N/A      02-03 @ 07:01  -  02-04 @ 02:43  --------------------------------------------------------  IN:    IV PiggyBack: 200 mL    Lactated Ringers IV Bolus: 1000 mL    sodium chloride 0.9%: 700 mL    Sodium Chloride 0.9% IV Bolus: 2000 mL  Total IN: 3900 mL    OUT:    Indwelling Catheter - Urethral: 1020 mL    Nasoenteral Tube: 20 mL  Total OUT: 1040 mL    Total NET: 2860 mL        --------------------------------------------------------------------------------------    EXAM  NEUROLOGY  Exam: Non verbal, difficulty to assess due to Autism     HEENT  Exam: Normocephalic, atraumatic.  EOMI     RESPIRATORY  Exam: Lungs clear to auscultation, Normal expansion/effort.        CARDIOVASCULAR  Exam: S1, S2.  Irregular heart rate. Tachycardia.     GI/NUTRITION  Exam: Abdomen soft, Non-tender, Non-distended.     Wound:  Surgical dressing over the abdomen, no sx of infection.     Current Diet:  NPO     VASCULAR  Exam: Extremities warm, pink, well-perfused.     MUSCULOSKELETAL  Exam: All extremities moving spontaneously without limitations.     SKIN:  Exam: Good skin turgor, no skin breakdown.     METABOLIC/FLUIDS/ELECTROLYTES  calcium gluconate IVPB 1 Gram(s) IV Intermittent once  sodium chloride 0.9%. 1000 milliLiter(s) IV Continuous <Continuous>      HEMATOLOGIC  [x] DVT Prophylaxis: enoxaparin Injectable 40 milliGRAM(s) SubCutaneous every 24 hours    Transfusions:	[] PRBC	[] Platelets		[] FFP	[] Cryoprecipitate    INFECTIOUS DISEASE  Antimicrobials/Immunologic Medications:  piperacillin/tazobactam IVPB. 3.375 Gram(s) IV Intermittent every 8 hours      Tubes/Lines/Drains  ***  [x] Peripheral IV  [] Central Venous Line     	[] R	[] L	[] IJ	[] Fem	[] SC	Date Placed:   [] Arterial Line		[] R	[] L	[] Fem	[] Rad	[] Ax	Date Placed:   [] PICC:         	[] Midline		[] Mediport  [] Urinary Catheter		Date Placed:     LABS  --------------------------------------------------------------------------------------  CBC (02-04 @ 01:35)                          9.9<L>                   4.29    )--------------(  183        --    % Neuts, --    % Lymphs, ANC: --                              30.1<L>  CBC (02-03 @ 13:06)                          12.6<L>                   8.29    )--------------(  222        83.7<H>% Neuts, 8.4<L>% Lymphs, ANC: 6.93                            39.5      BMP (02-04 @ 01:35)       133<L>  |  100     |  14    			Ca++ --      Ca 6.8<L>       ---------------------------------( 99    		Mg 1.3<L>       4.4     |  24      |  0.77  			Ph 3.2     BMP (02-03 @ 13:06)       131<L>  |  90<L>   |  24<H> 			Ca++ --      Ca 8.1<L>       ---------------------------------( 95    		Mg --           3.6     |  26      |  1.37<H>			Ph --        LFTs (02-03 @ 13:06)      TPro 6.9 / Alb 3.6 / TBili 0.9 / DBili -- / AST 22 / ALT 9 / AlkPhos 55    Coags (02-04 @ 01:35)  aPTT 28.9 / INR 1.54<H> / PT 17.9<H>  Coags (02-03 @ 16:58)  aPTT 28.7 / INR 1.47<H> / PT 17.0<H>      ABG (02-04 @ 01:35)     7.39 / 43 / 123<H> / 25 / 0.8 / 98.5%     Lactate: 0.8     VBG (02-03 @ 16:58)     7.35 / 51 / 38 / 25 / 2.0 / 66.2%      Lactate: 1.0  VBG (02-03 @ 13:06)     7.36 / 56<H> / < 24<L> / 27 / 5.3 / 21.7<L>%      Lactate: 1.6        --------------------------------------------------------------------------------------    OTHER LABS    IMAGING RESULTS  Echo:   CT:   Xray:     ASSESSMENT:  33M s/p laporatomy ileocecectomy, on POD1, was found to be hypotensive (), with Afib RVR. Pt received a total of 20mg Lopressor and 10mg Dilt post-op for rate control. Started on Surinder, 4L NS and in SICU for BP monitoring.     PLAN:    Neurologic: Pain control PRN.   Respiratory: Supplemental O2, as tolerated, Wean off from NR as tolerated.   Cardiovascular: Afib RVR, IVF and Rate control with Dilt drip. Started on Surinder 0.5. Will get A line.   Gastrointestinal/Nutrition: NPO, NS@ 150, will require aggressive fluid resuscitation.   Renal/Genitourinary: Monitor urine output.   Hematologic: Monitor H/H, VTE ppx with SQH in setting kidney injury  Infectious Disease: Zosyn for gangrenous bowel.   Tubes/Lines/Drains: bridgett Myrick to be placed upon SICU arrival   Endocrine: Monitor blood sugars, finger sticks q 6hrs  Disposition: SICU      --------------------------------------------------------------------------------------    Critical Care Diagnoses: Post op complication, Hypotension, Sepsis, Gangrenous Bowel SICU Consultation Note  =====================================================  HPI: 33M hx of HTN, autism (non verbal at baseline), paraxosmal Afib and chronic umbilical hernia presented   to ED on 2/3/18 for Abd pain, concerned for strangulated vs. incarcirnated hernia, brought to OR, found   to have perforated terminal ileium due to chicken bone with pus s/p laporatomy ileocecectomy. On POD1,   pt was found to be hypotensive (), with Afib. Pt received a total of 20mg Lopressor and 10mg   Dilt post-op for rate control. Card was consulted for Afib with RVR, with consideration to start on     Dilt drip. While in PACU, pt BP did not improve despite 2L NS and 250ml Albumin. Otherwise, pt's     afebrile. SICU was consulted for low BP with concern for sepsis. VS: 86/52, RR 19, Pulse 121. Pt's     aferibe, pending post op labs.     Surgery Information  Laparotomy ileocecectomy with pus      Case Duration: 	EBL: 50cc	      HISTORY  33y Male  HPI:  33M PMHx autism (non-verbal at baseline), HTN, chronic umbilical hernia presented to [facility] ED the afternoon of 2/3/18 for perceived abd pain. Unknown how long hernia present, or whether congenital; never able to reduce. Recently presented to OSH 3 days prior to presentation ([facility] ED 1/31) complaining of 1 episode of vomiting, and sent home w/ dx of viral gastroenteritis. Since that episode patient has not vomited, but family have perceived that pt appears more uncomfortable in recent few days and has been hiccuping. His umbilical area also appeared to grow in size & the overlying skin became red. Recent URI sx including cough that have largely resolved. Denies fever. last BM the morning of presentation and in ED. Patient hospitalized ~2 years ago at [facility] (after eating a banana whole, including peel) for esophageal tear (no operative intervention) and was discharged on liquid then puree/mush diet for ~6months. Patient reportedly will eat foreign objects within reach.     In ED VS: T37.1, P107, /99, RR18, SpO2 95% on RA. Labs notable for 83% neutrophils, Na 131, Cl 90, BUN 24, Cr 1.4, venous lactate 1.6. CT showing incarcerated umbilical hernia containing bowel, possibly ischemic changes and foreign objects in hernia & R colon. Received 2L NS, vanc/zosyn. General Surgery Consulted. (03 Feb 2018 18:23)    Allergies:   PAST MEDICAL & SURGICAL HISTORY:  Hypertension  Autism spectrum disorder  No significant past surgical history    FAMILY HISTORY:  No pertinent family history in first degree relatives      SOCIAL HISTORY:  N/A    ADVANCE DIRECTIVES: Presumed Full Code      REVIEW OF SYSTEMS:    See HPI. Otherwise, 10 point ROS done and otherwise negative      CURRENT MEDICATIONS:   --------------------------------------------------------------------------------------  Neurologic Medications  acetaminophen  IVPB. 1000 milliGRAM(s) IV Intermittent once  acetaminophen  IVPB. 1000 milliGRAM(s) IV Intermittent once  HYDROmorphone  Injectable 0.5 milliGRAM(s) IV Push every 10 minutes PRN Moderate Pain  morphine  - Injectable 2 milliGRAM(s) IV Push every 6 hours PRN Moderate Pain (4 - 6)  morphine  - Injectable 4 milliGRAM(s) IV Push every 6 hours PRN Severe Pain (7 - 10)  risperiDONE   Tablet 1 milliGRAM(s) Oral two times a day  traZODone 150 milliGRAM(s) Oral at bedtime  valproic  acid Syrup 250 milliGRAM(s) Oral two times a day    Respiratory Medications    Cardiovascular Medications  dronedarone 400 milliGRAM(s) Oral two times a day  phenylephrine    Infusion 0.5 MICROgram(s)/kG/Min IV Continuous <Continuous>    Gastrointestinal Medications  calcium gluconate IVPB 1 Gram(s) IV Intermittent once  sodium chloride 0.9%. 1000 milliLiter(s) IV Continuous <Continuous>    Genitourinary Medications    Hematologic/Oncologic Medications  enoxaparin Injectable 40 milliGRAM(s) SubCutaneous every 24 hours    Antimicrobial/Immunologic Medications  piperacillin/tazobactam IVPB. 3.375 Gram(s) IV Intermittent every 8 hours    Endocrine/Metabolic Medications    Topical/Other Medications    --------------------------------------------------------------------------------------    VITAL SIGNS, INS/OUTS (last 24 hours):  --------------------------------------------------------------------------------------  T(C): 36.6 (02-04-18 @ 02:00), Max: 37.1 (02-03-18 @ 16:02)  HR: 119 (02-04-18 @ 02:00) (76 - 140)  BP: 100/52 (02-04-18 @ 02:00) (81/43 - 129/99)  BP(mean): 62 (02-04-18 @ 02:00) (56 - 64)  ABP: --  ABP(mean): --  RR: 18 (02-04-18 @ 02:00) (13 - 28)  SpO2: 100% (02-04-18 @ 02:00) (94% - 100%)  Wt(kg): --  CVP(mm Hg): --  CI: --  CAPILLARY BLOOD GLUCOSE       N/A      02-03 @ 07:01  -  02-04 @ 02:43  --------------------------------------------------------  IN:    IV PiggyBack: 200 mL    Lactated Ringers IV Bolus: 1000 mL    sodium chloride 0.9%: 700 mL    Sodium Chloride 0.9% IV Bolus: 2000 mL  Total IN: 3900 mL    OUT:    Indwelling Catheter - Urethral: 1020 mL    Nasoenteral Tube: 20 mL  Total OUT: 1040 mL    Total NET: 2860 mL        --------------------------------------------------------------------------------------    EXAM  NEUROLOGY  Exam: Non verbal, difficulty to assess due to Autism     HEENT  Exam: Normocephalic, atraumatic.  EOMI     RESPIRATORY  Exam: Lungs clear to auscultation, Normal expansion/effort.        CARDIOVASCULAR  Exam: S1, S2.  Irregular heart rate. Tachycardia.     GI/NUTRITION  Exam: Abdomen soft, Non-tender, Non-distended.     Wound:  Surgical dressing over the abdomen, no sx of infection.     Current Diet:  NPO     VASCULAR  Exam: Extremities warm, pink, well-perfused.     MUSCULOSKELETAL  Exam: All extremities moving spontaneously without limitations.     SKIN:  Exam: Good skin turgor, no skin breakdown.     METABOLIC/FLUIDS/ELECTROLYTES  calcium gluconate IVPB 1 Gram(s) IV Intermittent once  sodium chloride 0.9%. 1000 milliLiter(s) IV Continuous <Continuous>      HEMATOLOGIC  [x] DVT Prophylaxis: enoxaparin Injectable 40 milliGRAM(s) SubCutaneous every 24 hours    Transfusions:	[] PRBC	[] Platelets		[] FFP	[] Cryoprecipitate    INFECTIOUS DISEASE  Antimicrobials/Immunologic Medications:  piperacillin/tazobactam IVPB. 3.375 Gram(s) IV Intermittent every 8 hours      Tubes/Lines/Drains  ***  [x] Peripheral IV  [] Central Venous Line     	[] R	[] L	[] IJ	[] Fem	[] SC	Date Placed:   [] Arterial Line		[] R	[] L	[] Fem	[] Rad	[] Ax	Date Placed:   [] PICC:         	[] Midline		[] Mediport  [] Urinary Catheter		Date Placed:     LABS  --------------------------------------------------------------------------------------  CBC (02-04 @ 01:35)                          9.9<L>                   4.29    )--------------(  183        --    % Neuts, --    % Lymphs, ANC: --                              30.1<L>  CBC (02-03 @ 13:06)                          12.6<L>                   8.29    )--------------(  222        83.7<H>% Neuts, 8.4<L>% Lymphs, ANC: 6.93                            39.5      BMP (02-04 @ 01:35)       133<L>  |  100     |  14    			Ca++ --      Ca 6.8<L>       ---------------------------------( 99    		Mg 1.3<L>       4.4     |  24      |  0.77  			Ph 3.2     BMP (02-03 @ 13:06)       131<L>  |  90<L>   |  24<H> 			Ca++ --      Ca 8.1<L>       ---------------------------------( 95    		Mg --           3.6     |  26      |  1.37<H>			Ph --        LFTs (02-03 @ 13:06)      TPro 6.9 / Alb 3.6 / TBili 0.9 / DBili -- / AST 22 / ALT 9 / AlkPhos 55    Coags (02-04 @ 01:35)  aPTT 28.9 / INR 1.54<H> / PT 17.9<H>  Coags (02-03 @ 16:58)  aPTT 28.7 / INR 1.47<H> / PT 17.0<H>      ABG (02-04 @ 01:35)     7.39 / 43 / 123<H> / 25 / 0.8 / 98.5%     Lactate: 0.8     VBG (02-03 @ 16:58)     7.35 / 51 / 38 / 25 / 2.0 / 66.2%      Lactate: 1.0  VBG (02-03 @ 13:06)     7.36 / 56<H> / < 24<L> / 27 / 5.3 / 21.7<L>%      Lactate: 1.6        --------------------------------------------------------------------------------------    OTHER LABS    IMAGING RESULTS  Echo:   CT:   Xray:     ASSESSMENT:  33M s/p laporatomy ileocecectomy, on POD1, was found to be hypotensive (), with Afib RVR. Pt received a total of 20mg Lopressor and 10mg Dilt post-op for rate control. Remained hypotensive and SICU accepted. Started on Surinder, 250 Albumin, 5.5 L NS and in SICU for BP monitoring.     PLAN:    Neurologic: Pain control PRN.   Respiratory: Supplemental O2, as tolerated, Wean off from NR as tolerated.   Cardiovascular: Afib RVR, IVF and Rate control with Dilt drip. Started on Surinder 0.5. Will get A line.   Gastrointestinal/Nutrition: NPO, NS@ 150, will require aggressive fluid resuscitation.   Renal/Genitourinary: Monitor urine output.   Hematologic: Monitor H/H, VTE ppx with SQH in setting kidney injury  Infectious Disease: Vanc and Zosyn for gangrenous bowel.   Tubes/Lines/Drains: bridgett Myrick to be placed upon SICU arrival   Endocrine: Monitor blood sugars, finger sticks q 6hrs  Disposition: SICU      --------------------------------------------------------------------------------------    Critical Care Diagnoses: Post op complication, Hypotension, Sepsis, Gangrenous Bowel

## 2018-02-04 NOTE — PROGRESS NOTE ADULT - SUBJECTIVE AND OBJECTIVE BOX
A Team Surgery Daily Progress Note    SUBJECTIVE:  At neurological baseline per family present in room.   No acute events in the immediate post-operative period.     OBJECTIVE:     ** VITAL SIGNS / I&O's **    T(C): 36.6 (02-04-18 @ 08:00), Max: 37.1 (02-03-18 @ 16:02)  T(F): 97.9 (02-04-18 @ 08:00), Max: 98.7 (02-03-18 @ 16:02)  HR: 112 (02-04-18 @ 08:00) (76 - 140)  BP: 99/67 (02-04-18 @ 08:00) (80/50 - 129/99)  RR: 15 (02-04-18 @ 08:00) (8 - 28)  SpO2: 96% (02-04-18 @ 08:00) (93% - 100%)      03 Feb 2018 07:01  -  04 Feb 2018 07:00  --------------------------------------------------------  IN:    IV PiggyBack: 950 mL    Lactated Ringers IV Bolus: 1000 mL    phenylephrine   Infusion: 189.5 mL    sodium chloride 0.9%: 700 mL    Sodium Chloride 0.9% IV Bolus: 2500 mL    sodium chloride 0.9%.: 750 mL  Total IN: 6089.5 mL    OUT:    Indwelling Catheter - Urethral: 2095 mL    Nasoenteral Tube: 70 mL  Total OUT: 2165 mL    Total NET: 3924.5 mL      04 Feb 2018 07:01  -  04 Feb 2018 10:13  --------------------------------------------------------  IN:    IV PiggyBack: 250 mL    phenylephrine   Infusion: 73.8 mL    sodium chloride 0.9%.: 150 mL  Total IN: 473.8 mL    OUT:    Indwelling Catheter - Urethral: 125 mL  Total OUT: 125 mL    Total NET: 348.8 mL          ** PHYSICAL EXAM **    -- CONSTITUTIONAL: awake, make eye contact, does not follow commands (will not squeeze hand when asked), nonverbal, appears comfortably in bed  -- HEENT: NGT with clear yellow/brown output   -- CARDIOVASCULAR: tachycardic, borderline hypotension off pressors   -- RESPIRATORY: breathing comfortably   -- ABDOMEN: soft, nontender, nondistended, ostomy pink and blanching, no gas in appliance   -- EXTREMITIES: warm and well perfused     ** LABS **                 11.4   6.47   )----------(  255       ( 04 Feb 2018 07:30 )               34.8      134    |  100    |  10     ----------------------------<  103        ( 04 Feb 2018 07:30 )  4.4     |  26     |  0.74     Ca    7.3        ( 04 Feb 2018 07:30 )  Phos  3.1       ( 04 Feb 2018 07:30 )  Mg     2.5       ( 04 Feb 2018 07:30 )      PT/INR -  16.3 SEC / 1.41    ( 04 Feb 2018 07:30 )       PTT -  31.0 SEC   ( 04 Feb 2018 07:30 )  CAPILLARY BLOOD GLUCOSE A Team Surgery Daily Progress Note    SUBJECTIVE:  At neurological baseline per family present in room.   No acute events in the immediate post-operative period.     OBJECTIVE:     ** VITAL SIGNS / I&O's **    T(C): 36.6 (02-04-18 @ 08:00), Max: 37.1 (02-03-18 @ 16:02)  T(F): 97.9 (02-04-18 @ 08:00), Max: 98.7 (02-03-18 @ 16:02)  HR: 112 (02-04-18 @ 08:00) (76 - 140)  BP: 99/67 (02-04-18 @ 08:00) (80/50 - 129/99)  RR: 15 (02-04-18 @ 08:00) (8 - 28)  SpO2: 96% (02-04-18 @ 08:00) (93% - 100%)      03 Feb 2018 07:01  -  04 Feb 2018 07:00  --------------------------------------------------------  IN:    IV PiggyBack: 950 mL    Lactated Ringers IV Bolus: 1000 mL    phenylephrine   Infusion: 189.5 mL    sodium chloride 0.9%: 700 mL    Sodium Chloride 0.9% IV Bolus: 2500 mL    sodium chloride 0.9%.: 750 mL  Total IN: 6089.5 mL    OUT:    Indwelling Catheter - Urethral: 2095 mL    Nasoenteral Tube: 70 mL  Total OUT: 2165 mL    Total NET: 3924.5 mL      04 Feb 2018 07:01  -  04 Feb 2018 10:13  --------------------------------------------------------  IN:    IV PiggyBack: 250 mL    phenylephrine   Infusion: 73.8 mL    sodium chloride 0.9%.: 150 mL  Total IN: 473.8 mL    OUT:    Indwelling Catheter - Urethral: 125 mL  Total OUT: 125 mL    Total NET: 348.8 mL          ** PHYSICAL EXAM **    -- CONSTITUTIONAL: awake, makes eye contact, following commands   -- HEENT: NGT with clear yellow/brown output   -- CARDIOVASCULAR: tachycardic, borderline hypotension off pressors   -- RESPIRATORY: breathing comfortably   -- ABDOMEN: soft, nontender, nondistended, incisions covered by clean dressing  -- EXTREMITIES: warm and well perfused     ** LABS **                 11.4   6.47   )----------(  255       ( 04 Feb 2018 07:30 )               34.8      134    |  100    |  10     ----------------------------<  103        ( 04 Feb 2018 07:30 )  4.4     |  26     |  0.74     Ca    7.3        ( 04 Feb 2018 07:30 )  Phos  3.1       ( 04 Feb 2018 07:30 )  Mg     2.5       ( 04 Feb 2018 07:30 )      PT/INR -  16.3 SEC / 1.41    ( 04 Feb 2018 07:30 )       PTT -  31.0 SEC   ( 04 Feb 2018 07:30 )  CAPILLARY BLOOD GLUCOSE A Team Surgery Daily Progress Note    SUBJECTIVE:  At neurological baseline per family present in room.   No acute events in the immediate post-operative period.     OBJECTIVE:     ** VITAL SIGNS / I&O's **    T(C): 36.6 (02-04-18 @ 08:00), Max: 37.1 (02-03-18 @ 16:02)  T(F): 97.9 (02-04-18 @ 08:00), Max: 98.7 (02-03-18 @ 16:02)  HR: 112 (02-04-18 @ 08:00) (76 - 140)  BP: 99/67 (02-04-18 @ 08:00) (80/50 - 129/99)  RR: 15 (02-04-18 @ 08:00) (8 - 28)  SpO2: 96% (02-04-18 @ 08:00) (93% - 100%)      03 Feb 2018 07:01  -  04 Feb 2018 07:00  --------------------------------------------------------  IN:    IV PiggyBack: 950 mL    Lactated Ringers IV Bolus: 1000 mL    phenylephrine   Infusion: 189.5 mL    sodium chloride 0.9%: 700 mL    Sodium Chloride 0.9% IV Bolus: 2500 mL    sodium chloride 0.9%.: 750 mL  Total IN: 6089.5 mL    OUT:    Indwelling Catheter - Urethral: 2095 mL    Nasoenteral Tube: 70 mL  Total OUT: 2165 mL    Total NET: 3924.5 mL      04 Feb 2018 07:01  -  04 Feb 2018 10:13  --------------------------------------------------------  IN:    IV PiggyBack: 250 mL    phenylephrine   Infusion: 73.8 mL    sodium chloride 0.9%.: 150 mL  Total IN: 473.8 mL    OUT:    Indwelling Catheter - Urethral: 125 mL  Total OUT: 125 mL    Total NET: 348.8 mL          ** PHYSICAL EXAM **    -- CONSTITUTIONAL: awake, makes eye contact, not following commands   -- HEENT: NGT with clear yellow/brown output   -- CARDIOVASCULAR: tachycardic, borderline hypotension off pressors   -- RESPIRATORY: breathing comfortably   -- ABDOMEN: soft, nontender, nondistended, incisions covered by clean dressing  -- EXTREMITIES: warm and well perfused     ** LABS **                 11.4   6.47   )----------(  255       ( 04 Feb 2018 07:30 )               34.8      134    |  100    |  10     ----------------------------<  103        ( 04 Feb 2018 07:30 )  4.4     |  26     |  0.74     Ca    7.3        ( 04 Feb 2018 07:30 )  Phos  3.1       ( 04 Feb 2018 07:30 )  Mg     2.5       ( 04 Feb 2018 07:30 )      PT/INR -  16.3 SEC / 1.41    ( 04 Feb 2018 07:30 )       PTT -  31.0 SEC   ( 04 Feb 2018 07:30 )  CAPILLARY BLOOD GLUCOSE

## 2018-02-04 NOTE — CONSULT NOTE ADULT - ATTENDING COMMENTS
Seen and examined.  Chart and note reviewed.  Case discussed with SICU team    Sepsis secondary to fecal peritonitis  a.  Nil per os  b.  Continue IVF resuscitation  c.  Continue zosyn, dc vancomycin  d.  Wean neosynephrine as tolerated    Atrial fibrillation  a.  Start amiodarone gtt  b.  Hold lopressor    Respiratory acidosis  a.  Repeat ABG  b.  Hold sedation/opoids    Spent 105 minutes in critical care
Agree with above. Known AF on Multaq at home now with aspiration and hypotensive on jose  AF with RVR in setting of critically ill patient  Will continue amiodarone  May need systemic AC given known AF  Will follow

## 2018-02-04 NOTE — PROGRESS NOTE ADULT - SUBJECTIVE AND OBJECTIVE BOX
[facility] GENERAL SURGERY POST-OP NOTE    SUBJECTIVE: Pt seen and evaluated at bedside. Resting in bed. Pain controlled based on VS. No events of nausea/vomiting. Voiding to portillo. NPO.    Objective:  Gen: NAD  Pulm: No work on breathing  Card: A fib  Abd: soft, appropriately tender, ND. Dressings CDI    Vital Signs Last 24 Hrs  T(C): 36.4 (03 Feb 2018 23:15), Max: 37.1 (03 Feb 2018 16:02)  T(F): 97.5 (03 Feb 2018 23:15), Max: 98.7 (03 Feb 2018 16:02)  HR: 112 (04 Feb 2018 00:15) (76 - 140)  BP: 89/54 (04 Feb 2018 00:15) (81/43 - 129/99)  BP(mean): --  RR: 17 (04 Feb 2018 00:15) (15 - 28)  SpO2: 99% (04 Feb 2018 00:15) (94% - 100%)  I&O's Summary    03 Feb 2018 07:01  -  04 Feb 2018 00:24  --------------------------------------------------------  IN: 2700 mL / OUT: 795 mL / NET: 1905 mL      I&O's Detail    03 Feb 2018 07:01  -  04 Feb 2018 00:24  --------------------------------------------------------  IN:    IV PiggyBack: 200 mL    Sodium Chloride 0.9% IV Bolus: 2000 mL    sodium chloride 0.9%.: 500 mL  Total IN: 2700 mL    OUT:    Indwelling Catheter - Urethral: 775 mL    Nasoenteral Tube: 20 mL  Total OUT: 795 mL    Total NET: 1905 mL          MEDICATIONS  (STANDING):  acetaminophen  IVPB. 1000 milliGRAM(s) IV Intermittent once  acetaminophen  IVPB. 1000 milliGRAM(s) IV Intermittent once  acetaminophen  IVPB. 1000 milliGRAM(s) IV Intermittent once  dronedarone 400 milliGRAM(s) Oral two times a day  enoxaparin Injectable 40 milliGRAM(s) SubCutaneous every 24 hours  lactated ringers Bolus 1000 milliLiter(s) IV Bolus once  piperacillin/tazobactam IVPB. 3.375 Gram(s) IV Intermittent every 8 hours  risperiDONE   Tablet 1 milliGRAM(s) Oral two times a day  sodium chloride 0.9%. 1000 milliLiter(s) (100 mL/Hr) IV Continuous <Continuous>  traZODone 150 milliGRAM(s) Oral at bedtime  valproic  acid Syrup 250 milliGRAM(s) Oral two times a day    MEDICATIONS  (PRN):  HYDROmorphone  Injectable 0.5 milliGRAM(s) IV Push every 10 minutes PRN Moderate Pain  morphine  - Injectable 2 milliGRAM(s) IV Push every 6 hours PRN Moderate Pain (4 - 6)  morphine  - Injectable 4 milliGRAM(s) IV Push every 6 hours PRN Severe Pain (7 - 10)      LABS:                        12.6   8.29  )-----------( 222      ( 03 Feb 2018 13:06 )             39.5     02-03    131<L>  |  90<L>  |  24<H>  ----------------------------<  95  3.6   |  26  |  1.37<H>    Ca    8.1<L>      03 Feb 2018 13:06    TPro  6.9  /  Alb  3.6  /  TBili  0.9  /  DBili  x   /  AST  22  /  ALT  9   /  AlkPhos  55  02-03    PT/INR - ( 03 Feb 2018 16:58 )   PT: 17.0 SEC;   INR: 1.47          PTT - ( 03 Feb 2018 16:58 )  PTT:28.7 SEC      RADIOLOGY & ADDITIONAL STUDIES:

## 2018-02-04 NOTE — PROGRESS NOTE ADULT - ASSESSMENT
ASSESSMENT  CHERYL MCCORMACK is a 33y Male    PLAN:  -       Pager: [phone] ASSESSMENT  CHERYL MCCORMACK is a 33y Male POD#1 from laparotomy, ileocecectomy. Started on amiodarone overnight for atrial fibrillation. Improving heart rate control.     PLAN:  - Amiodarone for atrial fibrillation   - Zosyn  - NPO/IVF  - SICU care    Pager: [phone]

## 2018-02-05 ENCOUNTER — TRANSCRIPTION ENCOUNTER (OUTPATIENT)
Age: 33
End: 2018-02-05

## 2018-02-05 ENCOUNTER — RESULT REVIEW (OUTPATIENT)
Age: 33
End: 2018-02-05

## 2018-02-05 LAB
APPEARANCE UR: CLEAR — SIGNIFICANT CHANGE UP
BACTERIA # UR AUTO: SIGNIFICANT CHANGE UP
BASE EXCESS BLDA CALC-SCNC: 0.7 MMOL/L — SIGNIFICANT CHANGE UP
BILIRUB UR-MCNC: NEGATIVE — SIGNIFICANT CHANGE UP
BLOOD UR QL VISUAL: NEGATIVE — SIGNIFICANT CHANGE UP
BUN SERPL-MCNC: 11 MG/DL — SIGNIFICANT CHANGE UP (ref 7–23)
BUN SERPL-MCNC: 13 MG/DL — SIGNIFICANT CHANGE UP (ref 7–23)
CA-I BLD-SCNC: 1.01 MMOL/L — LOW (ref 1.03–1.23)
CA-I BLDA-SCNC: 1.12 MMOL/L — LOW (ref 1.15–1.29)
CALCIUM SERPL-MCNC: 7.1 MG/DL — LOW (ref 8.4–10.5)
CALCIUM SERPL-MCNC: 7.4 MG/DL — LOW (ref 8.4–10.5)
CHLORIDE SERPL-SCNC: 100 MMOL/L — SIGNIFICANT CHANGE UP (ref 98–107)
CHLORIDE SERPL-SCNC: 101 MMOL/L — SIGNIFICANT CHANGE UP (ref 98–107)
CO2 SERPL-SCNC: 25 MMOL/L — SIGNIFICANT CHANGE UP (ref 22–31)
CO2 SERPL-SCNC: 26 MMOL/L — SIGNIFICANT CHANGE UP (ref 22–31)
COLOR SPEC: YELLOW — SIGNIFICANT CHANGE UP
CREAT SERPL-MCNC: 0.78 MG/DL — SIGNIFICANT CHANGE UP (ref 0.5–1.3)
CREAT SERPL-MCNC: 0.84 MG/DL — SIGNIFICANT CHANGE UP (ref 0.5–1.3)
GLUCOSE BLDA-MCNC: 88 MG/DL — SIGNIFICANT CHANGE UP (ref 70–99)
GLUCOSE SERPL-MCNC: 86 MG/DL — SIGNIFICANT CHANGE UP (ref 70–99)
GLUCOSE SERPL-MCNC: 89 MG/DL — SIGNIFICANT CHANGE UP (ref 70–99)
GLUCOSE UR-MCNC: NEGATIVE — SIGNIFICANT CHANGE UP
HCO3 BLDA-SCNC: 25 MMOL/L — SIGNIFICANT CHANGE UP (ref 22–26)
HCT VFR BLD CALC: 34.8 % — LOW (ref 39–50)
HCT VFR BLDA CALC: 32.3 % — LOW (ref 39–51)
HGB BLD-MCNC: 11.5 G/DL — LOW (ref 13–17)
HGB BLDA-MCNC: 10.4 G/DL — LOW (ref 13–17)
KETONES UR-MCNC: HIGH
LACTATE BLDA-SCNC: 0.9 MMOL/L — SIGNIFICANT CHANGE UP (ref 0.5–2)
LEUKOCYTE ESTERASE UR-ACNC: NEGATIVE — SIGNIFICANT CHANGE UP
MAGNESIUM SERPL-MCNC: 2 MG/DL — SIGNIFICANT CHANGE UP (ref 1.6–2.6)
MAGNESIUM SERPL-MCNC: 2 MG/DL — SIGNIFICANT CHANGE UP (ref 1.6–2.6)
MCHC RBC-ENTMCNC: 28.4 PG — SIGNIFICANT CHANGE UP (ref 27–34)
MCHC RBC-ENTMCNC: 33 % — SIGNIFICANT CHANGE UP (ref 32–36)
MCV RBC AUTO: 85.9 FL — SIGNIFICANT CHANGE UP (ref 80–100)
NITRITE UR-MCNC: NEGATIVE — SIGNIFICANT CHANGE UP
NON-SQ EPI CELLS # UR AUTO: <1 — SIGNIFICANT CHANGE UP
NRBC # FLD: 0 — SIGNIFICANT CHANGE UP
PCO2 BLDA: 46 MMHG — SIGNIFICANT CHANGE UP (ref 35–48)
PH BLDA: 7.36 PH — SIGNIFICANT CHANGE UP (ref 7.35–7.45)
PH UR: 6 — SIGNIFICANT CHANGE UP (ref 4.6–8)
PHOSPHATE SERPL-MCNC: 3.2 MG/DL — SIGNIFICANT CHANGE UP (ref 2.5–4.5)
PHOSPHATE SERPL-MCNC: 3.3 MG/DL — SIGNIFICANT CHANGE UP (ref 2.5–4.5)
PLATELET # BLD AUTO: 274 K/UL — SIGNIFICANT CHANGE UP (ref 150–400)
PMV BLD: 10.3 FL — SIGNIFICANT CHANGE UP (ref 7–13)
PO2 BLDA: 114 MMHG — HIGH (ref 83–108)
POTASSIUM BLDA-SCNC: 4.3 MMOL/L — SIGNIFICANT CHANGE UP (ref 3.4–4.5)
POTASSIUM SERPL-MCNC: 4.4 MMOL/L — SIGNIFICANT CHANGE UP (ref 3.5–5.3)
POTASSIUM SERPL-MCNC: 5.2 MMOL/L — SIGNIFICANT CHANGE UP (ref 3.5–5.3)
POTASSIUM SERPL-SCNC: 4.4 MMOL/L — SIGNIFICANT CHANGE UP (ref 3.5–5.3)
POTASSIUM SERPL-SCNC: 5.2 MMOL/L — SIGNIFICANT CHANGE UP (ref 3.5–5.3)
PROT UR-MCNC: 20 MG/DL — SIGNIFICANT CHANGE UP
RBC # BLD: 4.05 M/UL — LOW (ref 4.2–5.8)
RBC # FLD: 13.6 % — SIGNIFICANT CHANGE UP (ref 10.3–14.5)
RBC CASTS # UR COMP ASSIST: SIGNIFICANT CHANGE UP (ref 0–?)
SAO2 % BLDA: 98.3 % — SIGNIFICANT CHANGE UP (ref 95–99)
SODIUM BLDA-SCNC: 128 MMOL/L — LOW (ref 136–146)
SODIUM SERPL-SCNC: 135 MMOL/L — SIGNIFICANT CHANGE UP (ref 135–145)
SODIUM SERPL-SCNC: 136 MMOL/L — SIGNIFICANT CHANGE UP (ref 135–145)
SP GR SPEC: 1.03 — SIGNIFICANT CHANGE UP (ref 1–1.04)
SQUAMOUS # UR AUTO: SIGNIFICANT CHANGE UP
UROBILINOGEN FLD QL: NORMAL MG/DL — SIGNIFICANT CHANGE UP
WBC # BLD: 8.54 K/UL — SIGNIFICANT CHANGE UP (ref 3.8–10.5)
WBC # FLD AUTO: 8.54 K/UL — SIGNIFICANT CHANGE UP (ref 3.8–10.5)
WBC UR QL: SIGNIFICANT CHANGE UP (ref 0–?)

## 2018-02-05 PROCEDURE — 71045 X-RAY EXAM CHEST 1 VIEW: CPT | Mod: 26

## 2018-02-05 PROCEDURE — 88302 TISSUE EXAM BY PATHOLOGIST: CPT | Mod: 26

## 2018-02-05 PROCEDURE — 88307 TISSUE EXAM BY PATHOLOGIST: CPT | Mod: 26

## 2018-02-05 PROCEDURE — 93010 ELECTROCARDIOGRAM REPORT: CPT | Mod: 76

## 2018-02-05 PROCEDURE — 99291 CRITICAL CARE FIRST HOUR: CPT | Mod: 24

## 2018-02-05 PROCEDURE — 88300 SURGICAL PATH GROSS: CPT | Mod: 26

## 2018-02-05 RX ORDER — AMIODARONE HYDROCHLORIDE 400 MG/1
0.5 TABLET ORAL
Qty: 450 | Refills: 0 | Status: DISCONTINUED | OUTPATIENT
Start: 2018-02-05 | End: 2018-02-06

## 2018-02-05 RX ORDER — ACETAMINOPHEN 500 MG
1000 TABLET ORAL ONCE
Qty: 0 | Refills: 0 | Status: COMPLETED | OUTPATIENT
Start: 2018-02-05 | End: 2018-02-05

## 2018-02-05 RX ORDER — MAGNESIUM SULFATE 500 MG/ML
1 VIAL (ML) INJECTION ONCE
Qty: 0 | Refills: 0 | Status: COMPLETED | OUTPATIENT
Start: 2018-02-05 | End: 2018-02-05

## 2018-02-05 RX ORDER — METOPROLOL TARTRATE 50 MG
2.5 TABLET ORAL EVERY 6 HOURS
Qty: 0 | Refills: 0 | Status: DISCONTINUED | OUTPATIENT
Start: 2018-02-05 | End: 2018-02-06

## 2018-02-05 RX ORDER — SODIUM CHLORIDE 9 MG/ML
1000 INJECTION INTRAMUSCULAR; INTRAVENOUS; SUBCUTANEOUS ONCE
Qty: 0 | Refills: 0 | Status: DISCONTINUED | OUTPATIENT
Start: 2018-02-05 | End: 2018-02-05

## 2018-02-05 RX ORDER — SODIUM CHLORIDE 9 MG/ML
500 INJECTION INTRAMUSCULAR; INTRAVENOUS; SUBCUTANEOUS ONCE
Qty: 0 | Refills: 0 | Status: COMPLETED | OUTPATIENT
Start: 2018-02-05 | End: 2018-02-05

## 2018-02-05 RX ADMIN — SODIUM CHLORIDE 100 MILLILITER(S): 9 INJECTION INTRAMUSCULAR; INTRAVENOUS; SUBCUTANEOUS at 13:30

## 2018-02-05 RX ADMIN — AMIODARONE HYDROCHLORIDE 16.67 MG/MIN: 400 TABLET ORAL at 19:39

## 2018-02-05 RX ADMIN — SODIUM CHLORIDE 100 MILLILITER(S): 9 INJECTION INTRAMUSCULAR; INTRAVENOUS; SUBCUTANEOUS at 19:40

## 2018-02-05 RX ADMIN — Medication 2.5 MILLIGRAM(S): at 23:15

## 2018-02-05 RX ADMIN — Medication 2.5 MILLIGRAM(S): at 17:10

## 2018-02-05 RX ADMIN — Medication 26.25 MILLIGRAM(S): at 09:44

## 2018-02-05 RX ADMIN — PIPERACILLIN AND TAZOBACTAM 25 GRAM(S): 4; .5 INJECTION, POWDER, LYOPHILIZED, FOR SOLUTION INTRAVENOUS at 23:14

## 2018-02-05 RX ADMIN — PIPERACILLIN AND TAZOBACTAM 25 GRAM(S): 4; .5 INJECTION, POWDER, LYOPHILIZED, FOR SOLUTION INTRAVENOUS at 00:53

## 2018-02-05 RX ADMIN — AMIODARONE HYDROCHLORIDE 16.67 MG/MIN: 400 TABLET ORAL at 13:07

## 2018-02-05 RX ADMIN — PIPERACILLIN AND TAZOBACTAM 25 GRAM(S): 4; .5 INJECTION, POWDER, LYOPHILIZED, FOR SOLUTION INTRAVENOUS at 15:31

## 2018-02-05 RX ADMIN — SODIUM CHLORIDE 1000 MILLILITER(S): 9 INJECTION INTRAMUSCULAR; INTRAVENOUS; SUBCUTANEOUS at 15:00

## 2018-02-05 RX ADMIN — Medication 100 GRAM(S): at 14:10

## 2018-02-05 RX ADMIN — Medication 400 MILLIGRAM(S): at 01:00

## 2018-02-05 RX ADMIN — ENOXAPARIN SODIUM 40 MILLIGRAM(S): 100 INJECTION SUBCUTANEOUS at 06:32

## 2018-02-05 RX ADMIN — PHENYLEPHRINE HYDROCHLORIDE 15.38 MICROGRAM(S)/KG/MIN: 10 INJECTION INTRAVENOUS at 13:07

## 2018-02-05 RX ADMIN — Medication 400 MILLIGRAM(S): at 15:33

## 2018-02-05 RX ADMIN — Medication 26.25 MILLIGRAM(S): at 20:42

## 2018-02-05 RX ADMIN — PIPERACILLIN AND TAZOBACTAM 25 GRAM(S): 4; .5 INJECTION, POWDER, LYOPHILIZED, FOR SOLUTION INTRAVENOUS at 08:48

## 2018-02-05 NOTE — PROGRESS NOTE ADULT - SUBJECTIVE AND OBJECTIVE BOX
POST ANESTHESIA EVALUATION    33y Male POSTOP DAY 1 S/P exploratory laparotomy, ileo-cecectomy    MENTAL STATUS: Patient participation [x  ] Awake     [  ] Arousable     [  ] Sedated    AIRWAY PATENCY: [ X ] Satisfactory  [  ] Other:     Vital Signs Last 24 Hrs  T(C): 37.6 (05 Feb 2018 08:00), Max: 37.6 (05 Feb 2018 08:00)  T(F): 99.7 (05 Feb 2018 08:00), Max: 99.7 (05 Feb 2018 08:00)  HR: 118 (05 Feb 2018 10:00) (110 - 122)  BP: 104/68 (05 Feb 2018 09:30) (84/59 - 113/67)  BP(mean): 76 (05 Feb 2018 09:30) (64 - 76)  RR: 22 (05 Feb 2018 10:00) (11 - 31)  SpO2: 99% (05 Feb 2018 10:00) (91% - 100%)  I&O's Summary    04 Feb 2018 07:01  -  05 Feb 2018 07:00  --------------------------------------------------------  IN: 3544.5 mL / OUT: 1255 mL / NET: 2289.5 mL    05 Feb 2018 07:01  -  05 Feb 2018 10:16  --------------------------------------------------------  IN: 45.4 mL / OUT: 100 mL / NET: -54.6 mL          NAUSEA/ VOMITTING:  [ X ] NONE  [  ] CONTROLLED [  ] OTHER     PAIN: [ X ] CONTROLLED WITH CURRENT REGIMEN  [  ] OTHER    [X] NO APPARENT ANESTHESIA COMPLICATIONS      Comments:

## 2018-02-05 NOTE — PROGRESS NOTE ADULT - ASSESSMENT
ASSESSMENT:  33M s/p laporatomy ileocecectomy, on POD1, was found to be hypotensive (), with Afib RVR. Pt received a total of 20mg Lopressor and 10mg Dilt post-op for rate control. Remained hypotensive and SICU accepted. Started on Surinder, 250 Albumin, 5.5 L NS and in SICU for BP monitoring.     PLAN:    Neurologic: Pain control PRN. restart home regimen   Respiratory: Supplemental O2, as tolerated, Wean off from NR as tolerated.   Cardiovascular: Afib RVR, IVF and Rate control with amio  drip. On Surinder- wean as tolerated   Gastrointestinal/Nutrition: NPO, NS@ 100, will require aggressive fluid resuscitation.   Renal/Genitourinary: Monitor urine output.   Hematologic: Monitor H/H, VTE ppx with SQH in setting kidney injury  Infectious Disease: Zosyn for gangrenous bowel.   Tubes/Lines/Drains: Myrick , A line   Endocrine: Monitor blood sugars, finger sticks q 6hrs  Disposition: SICU      --------------------------------------------------------------------------------------    Critical Care Diagnoses: Post op complication, Hypotension, Sepsis, Gangrenous Bowel ASSESSMENT:  33M s/p laporatomy ileocecectomy, on POD1, was found to be hypotensive (), with Afib RVR. Pt received a total of 20mg Lopressor and 10mg Dilt post-op for rate control. Remained hypotensive and SICU accepted. Started on Surinder, 250 Albumin, 5.5 L NS and in SICU for BP monitoring.     PLAN:    Neurologic: Pain control PRN. restart home regimen   Respiratory: Supplemental O2, as tolerated, Wean off from NR as tolerated.   Cardiovascular: Afib RVR, IVF and Rate control with amio  drip. On Surinedr- wean as tolerated   Gastrointestinal/Nutrition: NPO, NS@ 100, will require aggressive fluid resuscitation.   Renal/Genitourinary: Monitor urine output.   Hematologic: Monitor H/H, VTE ppx with SQH in setting kidney injury  Infectious Disease: Zosyn for gangrenous bowel.   Tubes/Lines/Drains: Myrick , A line   Endocrine: Monitor blood sugars, finger sticks q 6hrs  Disposition: SICU    --------------------------------------------------------------------------------------    Critical Care Diagnoses: Post op complication, Hypotension, Sepsis, Gangrenous Bowel ASSESSMENT:  33M w/ Autism and paroxysmal afib not on AC POD#2 s/p laporatomy ileocecectomy post-operative course complicated by hypotension and atrial fibrillation w/ RVR.      PLAN:    Neurologic: Pain control PRN. Restart home regimen of Risperidone and Depakote when tolerating PO.    Respiratory: Wean NC as tolerated.   Cardiovascular: BP now stable off of Phenylephrine. Afib intermittently w/ RVR and patient also converts to NSR. C/w Amiodarone, will add low dose Metoprolol.   Gastrointestinal/Nutrition: NPO, c/w NS@ 100, give NS bolus as patient appears dehydrated.  Renal/Genitourinary: Electrolytes WNL, c/w IV fluids as above.   Hematologic: Monitor H/H, VTE ppx with SQH. Will consult PCP/ primary team as to why patient was not on anticoagulation, plant to start ac.   Infectious Disease: c/w Zosyn for gangrenous bowel/ ileal perforation.   Tubes/Lines/Drains: Myrick , A line.    Endocrine: Monitor blood sugars, finger sticks Q6H.   Disposition: SICU.     --------------------------------------------------------------------------------------    Critical Care Diagnoses: Post op complication, Hypotension, Sepsis, Gangrenous Bowel ASSESSMENT:  33M w/ Autism and paroxysmal afib not on AC POD#2 s/p laporatomy ileocecectomy post-operative course complicated by hypotension and atrial fibrillation w/ RVR.      PLAN:    Neurologic: Pain control PRN. Restart home regimen of Risperidone and Depakote when tolerating PO.    Respiratory: Wean NC as tolerated.   Cardiovascular: BP now stable off of Phenylephrine. Afib intermittently w/ RVR and patient also converts to NSR. C/w Amiodarone, will add low dose Metoprolol.   Gastrointestinal/Nutrition: NPO, c/w NS@ 100, give NS bolus as patient appears dehydrated.  Renal/Genitourinary: Electrolytes WNL, c/w IV fluids as above.   Hematologic: Monitor H/H, VTE ppx with SQH, SCDs. Will consult PCP/ primary team as to why patient was not on anticoagulation, possible to start AC.  Infectious Disease: c/w Zosyn for gangrenous bowel/ ileal perforation. Blood Cx x2 and UA sent for fever.  Tubes/Lines/Drains: NGT, IJ TLC, left periph IV x2, Myrick.  d/c R radial A line.    Endocrine: Monitor blood sugars, finger sticks Q6H.   Disposition: SICU.     --------------------------------------------------------------------------------------    Critical Care Diagnoses: Post op complication, Hypotension, Sepsis, Gangrenous Bowel

## 2018-02-05 NOTE — PROGRESS NOTE ADULT - ASSESSMENT
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  In PACU, patient to have afib with RVR HR up to 130's.     Today pt's rates in 100-120s, normotensive. Pt continues to get amio loading     Plan  - after 1 g of IV amiodorone loading, can start PO Amiodorone 400 mg BID x 12 doses   - continue to monitor rates on tele.   - plan to be discussed with attending Dr. Arriaga.     Bautista Clark MD  Cardiology Fellow  x [phone]

## 2018-02-05 NOTE — PROGRESS NOTE ADULT - SUBJECTIVE AND OBJECTIVE BOX
SICU AM PROGRESS NOTE   =====================================================    Overnight / Interval Events :    Pt has been on amio gtt - HR in 100 's . Pt was off jose between 7: 30 pm to 11 pm . Back on pressors .  Tylenol was given for pain.         Surgery Information  Laparotomy ileocecectomy with pus      Case Duration: 	EBL: 50cc	      HISTORY  33y Male  HPI:  33M PMHx autism (non-verbal at baseline), HTN, chronic umbilical hernia presented to [facility] ED the afternoon of 2/3/18 for perceived abd pain. Unknown how long hernia present, or whether congenital; never able to reduce. Recently presented to OSH 3 days prior to presentation ([facility] ED 1/31) complaining of 1 episode of vomiting, and sent home w/ dx of viral gastroenteritis. Since that episode patient has not vomited, but family have perceived that pt appears more uncomfortable in recent few days and has been hiccuping. His umbilical area also appeared to grow in size & the overlying skin became red. Recent URI sx including cough that have largely resolved. Denies fever. last BM the morning of presentation and in ED. Patient hospitalized ~2 years ago at [facility] (after eating a banana whole, including peel) for esophageal tear (no operative intervention) and was discharged on liquid then puree/mush diet for ~6months. Patient reportedly will eat foreign objects within reach.     In ED VS: T37.1, P107, /99, RR18, SpO2 95% on RA. Labs notable for 83% neutrophils, Na 131, Cl 90, BUN 24, Cr 1.4, venous lactate 1.6. CT showing incarcerated umbilical hernia containing bowel, possibly ischemic changes and foreign objects in hernia & R colon. Received 2L NS, vanc/zosyn. General Surgery Consulted. (03 Feb 2018 18:23)    Allergies:   PAST MEDICAL & SURGICAL HISTORY:  Hypertension  Autism spectrum disorder  No significant past surgical history    FAMILY HISTORY:  No pertinent family history in first degree relatives      SOCIAL HISTORY:  N/A    ADVANCE DIRECTIVES: Presumed Full Code      REVIEW OF SYSTEMS:    See HPI. Otherwise, 10 point ROS done and otherwise negative      CURRENT MEDICATIONS:   --------------------------------------------------------------------------------------  Neurologic Medications:  acetaminophen  IVPB. 1000 milliGRAM(s) IV Intermittent once  valproate sodium IVPB 250 milliGRAM(s) IV Intermittent every 12 hours    Respiratory Medications:    Cardiovascular Medications:  amiodarone Infusion 1 mG/Min IV Continuous <Continuous>  amiodarone Infusion 0.5 mG/Min IV Continuous <Continuous>  phenylephrine    Infusion 0.5 MICROgram(s)/kG/Min IV Continuous <Continuous>    Gastrointestinal Medications:  magnesium sulfate  IVPB 2 Gram(s) IV Intermittent daily  sodium chloride 0.9%. 1000 milliLiter(s) IV Continuous <Continuous>    Genitourinary Medications:    Hematologic/Oncologic Medications:  enoxaparin Injectable 40 milliGRAM(s) SubCutaneous every 24 hours    Antimicrobials/Immunologic Medications:  piperacillin/tazobactam IVPB. 3.375 Gram(s) IV Intermittent every 8 hours    Endocrine/Metabolic Medications:    Topical/Other Medications:    --------------------------------------------------------------------------------------    VITAL SIGNS, INS/OUTS (last 24 hours):  --------------------------------------------------------------------------------------  T(C): 37.1 (02-04-18 @ 19:30), Max: 37.2 (02-04-18 @ 12:00)  HR: 115 (02-04-18 @ 19:30) (111 - 128)  BP: 99/67 (02-04-18 @ 08:00) (80/50 - 100/52)  BP(mean): 74 (02-04-18 @ 08:00) (53 - 74)  ABP: 107/48 (02-04-18 @ 19:30) (77/48 - 115/61)  ABP(mean): 70 (02-04-18 @ 19:30) (59 - 78)  RR: 18 (02-04-18 @ 19:30) (8 - 31)  SpO2: 97% (02-04-18 @ 19:30) (93% - 100%)  Wt(kg): --  CVP(mm Hg): --  CI: --  CAPILLARY BLOOD GLUCOSE       N/A      02-03 @ 07:01  -  02-04 @ 07:00  --------------------------------------------------------  IN:    IV PiggyBack: 950 mL    Lactated Ringers IV Bolus: 1000 mL    phenylephrine   Infusion: 189.5 mL    sodium chloride 0.9%: 700 mL    Sodium Chloride 0.9% IV Bolus: 2500 mL    sodium chloride 0.9%.: 750 mL  Total IN: 6089.5 mL    OUT:    Indwelling Catheter - Urethral: 2095 mL    Nasoenteral Tube: 70 mL  Total OUT: 2165 mL    Total NET: 3924.5 mL      02-04 @ 07:01  -  02-05 @ 00:17  --------------------------------------------------------  IN:    amiodarone Infusion: 199.8 mL    amiodarone Infusion: 100.2 mL    IV PiggyBack: 350 mL    phenylephrine   Infusion: 212.4 mL    phenylephrine   Infusion: 73.8 mL    sodium chloride 0.9%.: 1450 mL  Total IN: 2386.2 mL    OUT:    Indwelling Catheter - Urethral: 775 mL    Nasoenteral Tube: 150 mL  Total OUT: 925 mL    Total NET: 1461.2 mL            --------------------------------------------------------------------------------------    EXAM  NEUROLOGY  Exam: Non verbal, difficulty to assess due to Autism     HEENT  Exam: Normocephalic, atraumatic.  EOMI     RESPIRATORY  Exam: Lungs clear to auscultation, Normal expansion/effort.        CARDIOVASCULAR  Exam: S1, S2.  Irregular heart rate. Tachycardia.     GI/NUTRITION  Exam: Abdomen soft, Non-tender, Non-distended.     Wound:  Surgical dressing over the abdomen, no sx of infection.     Current Diet:  NPO     VASCULAR  Exam: Extremities warm, pink, well-perfused.     MUSCULOSKELETAL  Exam: All extremities moving spontaneously without limitations.     SKIN:  Exam: Good skin turgor, no skin breakdown.     METABOLIC/FLUIDS/ELECTROLYTES  calcium gluconate IVPB 1 Gram(s) IV Intermittent once  sodium chloride 0.9%. 1000 milliLiter(s) IV Continuous <Continuous>      HEMATOLOGIC  [x] DVT Prophylaxis: enoxaparin Injectable 40 milliGRAM(s) SubCutaneous every 24 hours    Transfusions:	[] PRBC	[] Platelets		[] FFP	[] Cryoprecipitate    INFECTIOUS DISEASE  Antimicrobials/Immunologic Medications:  piperacillin/tazobactam IVPB. 3.375 Gram(s) IV Intermittent every 8 hours      Tubes/Lines/Drains  ***  [x] Peripheral IV  [] Central Venous Line     	[] R	[] L	[] IJ	[] Fem	[] SC	Date Placed:   [] Arterial Line		[] R	[] L	[] Fem	[] Rad	[] Ax	Date Placed:   [] PICC:         	[] Midline		[] Mediport  [] Urinary Catheter		Date Placed:     LABS  --------------------------------------------------------------------------------------  CBC (02-04 @ 07:30)                              11.4<L>                       6.47    )--------------(  255        --    % Neuts, --    % Lymphs, ANC: --                                  34.8<L>  CBC (02-04 @ 01:35)                              9.9<L>                       4.29    )--------------(  183        --    % Neuts, --    % Lymphs, ANC: --                                  30.1<L>    BMP (02-04 @ 07:30)             134<L>  |  100     |  10    		Ca++ --      Ca 7.3<L>             ---------------------------------( 103<H>		Mg 2.5                4.4     |  26      |  0.74  			Ph 3.1     BMP (02-04 @ 01:35)             133<L>  |  100     |  14    		Ca++ --      Ca 6.8<L>             ---------------------------------( 99    		Mg 1.3<L>             4.4     |  24      |  0.77  			Ph 3.2       LFTs (02-03 @ 13:06)      TPro 6.9 / Alb 3.6 / TBili 0.9 / DBili -- / AST 22 / ALT 9 / AlkPhos 55    Coags (02-04 @ 07:30)  aPTT 31.0 / INR 1.41<H> / PT 16.3<H>  Coags (02-04 @ 01:35)  aPTT 28.9 / INR 1.54<H> / PT 17.9<H>      ABG (02-04 @ 10:51)     7.34<L> / 49<H> / 140<H> / 25 / 0.6 / 98.5%     Lactate:     ABG (02-04 @ 07:30)     7.32<L> / 51<H> / 88 / 24 / 0.0 / 96.2%     Lactate: 0.7      VBG (02-03 @ 16:58)     7.35 / 51 / 38 / 25 / 2.0 / 66.2%  VBG (02-03 @ 13:06)     7.36 / 56<H> / < 24<L> / 27 / 5.3 / 21.7<L>%            --------------------------------------------------------------------------------------    OTHER LABS    IMAGING RESULTS  Echo:   CT:   Xray: SICU AM PROGRESS NOTE   =====================================================    Overnight / Interval Events :    Pt has been on amio gtt - HR in 100 's . Pt was off jose between 7: 30 pm to 11 pm . Back on pressors .  Tylenol was given for pain.     Surgery Information:  Laparotomy ileocecectomy with pus      Case Duration: 	EBL: 50cc	      HISTORY  33y Male  HPI:  33M PMHx autism (non-verbal at baseline), HTN, chronic umbilical hernia presented to [facility] ED the afternoon of 2/3/18 for perceived abd pain. Unknown how long hernia present, or whether congenital; never able to reduce. Recently presented to OSH 3 days prior to presentation ([facility] ED 1/31) complaining of 1 episode of vomiting, and sent home w/ dx of viral gastroenteritis. Since that episode patient has not vomited, but family have perceived that pt appears more uncomfortable in recent few days and has been hiccuping. His umbilical area also appeared to grow in size & the overlying skin became red. Recent URI sx including cough that have largely resolved. Denies fever. last BM the morning of presentation and in ED. Patient hospitalized ~2 years ago at [facility] (after eating a banana whole, including peel) for esophageal tear (no operative intervention) and was discharged on liquid then puree/mush diet for ~6months. Patient reportedly will eat foreign objects within reach.     In ED VS: T37.1, P107, /99, RR18, SpO2 95% on RA. Labs notable for 83% neutrophils, Na 131, Cl 90, BUN 24, Cr 1.4, venous lactate 1.6. CT showing incarcerated umbilical hernia containing bowel, possibly ischemic changes and foreign objects in hernia & R colon. Received 2L NS, vanc/zosyn. General Surgery Consulted. (03 Feb 2018 18:23)    Allergies: NKDA    PAST MEDICAL & SURGICAL HISTORY:  Hypertension  Autism spectrum disorder  No significant past surgical history    FAMILY HISTORY: No pertinent family history in first degree relatives    SOCIAL HISTORY:  N/A    ADVANCE DIRECTIVES: Presumed Full Code      REVIEW OF SYSTEMS:  See HPI. Otherwise, 10 point ROS done and otherwise negative    CURRENT MEDICATIONS:   --------------------------------------------------------------------------------------  Neurologic Medications:  acetaminophen  IVPB. 1000 milliGRAM(s) IV Intermittent once  valproate sodium IVPB 250 milliGRAM(s) IV Intermittent every 12 hours    Respiratory Medications:  none    Cardiovascular Medications:  amiodarone Infusion 1 mG/Min IV Continuous <Continuous>  amiodarone Infusion 0.5 mG/Min IV Continuous <Continuous>  phenylephrine    Infusion 0.5 MICROgram(s)/kG/Min IV Continuous <Continuous>    Gastrointestinal Medications:  magnesium sulfate  IVPB 2 Gram(s) IV Intermittent daily  sodium chloride 0.9%. 1000 milliLiter(s) IV Continuous <Continuous>    Genitourinary Medications:  none    Hematologic/Oncologic Medications:  enoxaparin Injectable 40 milliGRAM(s) SubCutaneous every 24 hours    Antimicrobials/Immunologic Medications:  piperacillin/tazobactam IVPB. 3.375 Gram(s) IV Intermittent every 8 hours    Endocrine/Metabolic Medications:  none    Topical/Other Medications:  none    VITAL SIGNS, INS/OUTS (last 24 hours):  --------------------------------------------------------------------------------------  T(C): 37.1 (02-04-18 @ 19:30), Max: 37.2 (02-04-18 @ 12:00)  HR: 115 (02-04-18 @ 19:30) (111 - 128)  BP: 99/67 (02-04-18 @ 08:00) (80/50 - 100/52)  BP(mean): 74 (02-04-18 @ 08:00) (53 - 74)  ABP: 107/48 (02-04-18 @ 19:30) (77/48 - 115/61)  ABP(mean): 70 (02-04-18 @ 19:30) (59 - 78)  RR: 18 (02-04-18 @ 19:30) (8 - 31)  SpO2: 97% (02-04-18 @ 19:30) (93% - 100%)  Wt(kg): 82    02-03 @ 07:01  -  02-04 @ 07:00  --------------------------------------------------------  IN:    IV PiggyBack: 950 mL    Lactated Ringers IV Bolus: 1000 mL    phenylephrine   Infusion: 189.5 mL    sodium chloride 0.9%: 700 mL    Sodium Chloride 0.9% IV Bolus: 2500 mL    sodium chloride 0.9%.: 750 mL  Total IN: 6089.5 mL    OUT:    Indwelling Catheter - Urethral: 2095 mL    Nasoenteral Tube: 70 mL  Total OUT: 2165 mL    Total NET: 3924.5 mL      02-04 @ 07:01  -  02-05 @ 00:17  --------------------------------------------------------  IN:    amiodarone Infusion: 199.8 mL    amiodarone Infusion: 100.2 mL    IV PiggyBack: 350 mL    phenylephrine   Infusion: 212.4 mL    phenylephrine   Infusion: 73.8 mL    sodium chloride 0.9%.: 1450 mL  Total IN: 2386.2 mL    OUT:    Indwelling Catheter - Urethral: 775 mL    Nasoenteral Tube: 150 mL  Total OUT: 925 mL    Total NET: 1461.2 mL    EXAM  --------------------------------------------------------------------------------------  NEUROLOGY  Exam: Non verbal, difficult to assess due to Autism     HEENT  Exam: Normocephalic, atraumatic.  EOMI     RESPIRATORY  Exam: Lungs clear to auscultation, Normal expansion/effort.      CARDIOVASCULAR  Exam: S1, S2.  Irregular heart rate. Tachycardia.     GI/NUTRITION  Exam: Abdomen soft, Non-tender, Non-distended. NGT on low suction.  Wound:  Surgical dressing over the abdomen, no sx of infection.   Current Diet:  NPO     VASCULAR  Exam: Extremities warm, pink, well-perfused.  SCDs in place.    MUSCULOSKELETAL  Exam: All extremities moving spontaneously without limitations.     SKIN:  Exam: Good skin turgor, no skin breakdown.     METABOLIC/FLUIDS/ELECTROLYTES  calcium gluconate IVPB 1 Gram(s) IV Intermittent once  sodium chloride 0.9%. 1000 milliLiter(s) IV Continuous <Continuous>    HEMATOLOGIC  [x] DVT Prophylaxis: SCDs, enoxaparin Injectable 40 milliGRAM(s) SubCutaneous every 24 hours  Transfusions: 	[] PRBC	[] Platelets  [] FFP  [] Cryoprecipitate    INFECTIOUS DISEASE  Antimicrobials/Immunologic Medications:  piperacillin/tazobactam IVPB. 3.375 Gram(s) IV Intermittent every 8 hours    Tubes/Lines/Drains  ***  [x] Peripheral IV  [] Central Venous Line    [] R	[] L	[] IJ	[] Fem	[] SC	Date Placed:   [] Arterial Line		[] R	[] L	[] Fem	[] Rad	[] Ax	Date Placed:   [] PICC:         		[] Midline	[] Mediport  [x] Urinary Catheter	Date Placed: 2/4/18    LABS  --------------------------------------------------------------------------------------  CBC (02-04 @ 07:30)                          11.4<L>                       6.47    )--------------(  255                                   34.8<L>  CBC (02-04 @ 01:35)                          9.9<L>                       4.29    )--------------(  183                                  30.1<L>    BMP (02-04 @ 07:30)             134<L>  |  100     |  10    			Ca 7.3<L>             ---------------------------------( 103<H>	Mg 2.5                4.4     |  26      |  0.74  			Ph 3.1     BMP (02-04 @ 01:35)             133<L>  |  100     |  14    		            Ca 6.8<L>             ---------------------------------( 99    	Mg 1.3<L>             4.4     |  24      |  0.77  			Ph 3.2       LFTs (02-03 @ 13:06)      TPro 6.9 / Alb 3.6 / TBili 0.9 / DBili -- / AST 22 / ALT 9 / AlkPhos 55    Coags (02-04 @ 07:30)  aPTT 31.0 / INR 1.41<H> / PT 16.3<H>  Coags (02-04 @ 01:35)  aPTT 28.9 / INR 1.54<H> / PT 17.9<H>    ABG (02-04 @ 10:51)     7.34<L> / 49<H> / 140<H> / 25 / 0.6 / 98.5%     Lactate:     ABG (02-04 @ 07:30)     7.32<L> / 51<H> / 88 / 24 / 0.0 / 96.2%     Lactate: 0.7      VBG (02-03 @ 16:58)     7.35 / 51 / 38 / 25 / 2.0 / 66.2%  VBG (02-03 @ 13:06)     7.36 / 56<H> / < 24<L> / 27 / 5.3 / 21.7<L>%    -------------------------------------------------------------------------------------  IMAGING RESULTS    GIOVANNI: February 4, 2018 at 09:54 AM  Conclusions:  Technically difficult study.  1. Normal mitral valve. Mild mitral regurgitation.  2. Normal trileaflet aortic valve. No aortic valve regurgitation seen.  3. Endocardium not well visualized; grossly low normal left  ventricular systolic function. Patient in atrial fibrillation with rapid ventricular rate; ejection fraction varies with R-R interval.  4. Right ventricle assessment limited by atrial fibrillation with rapid ventricular response and poor visualization of the RV; grossly the right ventircle appears normal size with midlly decreased systolic function.  5. Normal tricuspid valve.   Moderate tricuspid regurgitation.  6. Small pericardial effusion.     Portable CXR: February 5, 2018 at 12:11 AM  INTERPRETATION: Enteric tube and right IJ line in place. Heart is enlarged but stable. No focal consolidations. No pneumothorax or obvious effusions.  Right upper lobe atelectasis is totally resolved.  COMPARISON:  February 4    IMPRESSION:  Clear lungs post abdominal surgery with resolution of right upper lobe atelectasis. SICU AM PROGRESS NOTE   =====================================================    Overnight / Interval Events :  ONE: Pt on amio gtt - HR in 100 's. Pt was off surinder between 7: 30 pm to 11 pm, then back on pressors .  Tylenol was given for pain.   9/5 Day Events:   - Mild oliguria throughout the AM.  500cc bolus given.  - Surinder was weaned.  SBP was stable >100.   - NGT had 250cc out this AM after flush.  - Pt had many episodes of sinus rhythm alternating with Afib with rate to 150-160s throughout the morning.  Metop 2.5 IVP was given.    - Pt had fever to 101.2.  IV Tylenol given.  Zosyn continued.  Blood Cx x2 and UA sent.    Surgery Information:  Laparotomy ileocecectomy with pus    EBL: 50cc	    HPI:  33M PMHx autism (non-verbal at baseline), HTN, chronic umbilical hernia presented to [facility] ED the afternoon of 2/3/18 for perceived abd pain. Unknown how long hernia present, or whether congenital; never able to reduce. Recently presented to OSH 3 days prior to presentation ([facility] ED 1/31) complaining of 1 episode of vomiting, and sent home w/ dx of viral gastroenteritis. Since that episode patient has not vomited, but family have perceived that pt appears more uncomfortable in recent few days and has been hiccuping. His umbilical area also appeared to grow in size & the overlying skin became red. Recent URI sx including cough that have largely resolved. Denies fever. last BM the morning of presentation and in ED. Patient hospitalized ~2 years ago at [facility] (after eating a banana whole, including peel) for esophageal tear (no operative intervention) and was discharged on liquid then puree/mush diet for ~6months. Patient reportedly will eat foreign objects within reach.     In ED VS: T37.1, P107, /99, RR18, SpO2 95% on RA. Labs notable for 83% neutrophils, Na 131, Cl 90, BUN 24, Cr 1.4, venous lactate 1.6. CT showing incarcerated umbilical hernia containing bowel, possibly ischemic changes and foreign objects in hernia & R colon. Received 2L NS, vanc/zosyn. General Surgery Consulted. (03 Feb 2018 18:23)    Allergies: NKDA    PAST MEDICAL & SURGICAL HISTORY:  Hypertension  Autism spectrum disorder  Hx seizure (remote; per sister)  chronic umbilical hernia  paroxysmal AFib (pt had been prescribed eloquis in 2017 per PMD)  Gastritis  Pica w/ hx of esophageal rupture  No significant past surgical history    FAMILY HISTORY: No pertinent family history in first degree relatives    SOCIAL HISTORY:  N/A    ADVANCE DIRECTIVES: Presumed Full Code      REVIEW OF SYSTEMS:  See HPI. Otherwise, 10 point ROS done and otherwise negative    CURRENT MEDICATIONS:   --------------------------------------------------------------------------------------  Neurologic Medications:  acetaminophen  IVPB. 1000 milliGRAM(s) IV Intermittent   valproate sodium IVPB 250 milliGRAM(s) IV Intermittent every 12 hours    Respiratory Medications:  none    Cardiovascular Medications:  amiodarone Infusion 1 mG/Min IV Continuous <Continuous>  amiodarone Infusion 0.5 mG/Min IV Continuous <Continuous>  phenylephrine    Infusion 0.5 MICROgram(s)/kG/Min IV Continuous <Continuous>    Gastrointestinal Medications:  magnesium sulfate  IVPB 2 Gram(s) IV Intermittent daily  sodium chloride 0.9%. 1000 milliLiter(s) IV Continuous <Continuous>    Genitourinary Medications:  none    Hematologic/Oncologic Medications:  enoxaparin Injectable 40 milliGRAM(s) SubCutaneous every 24 hours    Antimicrobials/Immunologic Medications:  piperacillin/tazobactam IVPB. 3.375 Gram(s) IV Intermittent every 8 hours    Endocrine/Metabolic Medications:  none    Topical/Other Medications:  none    VITAL SIGNS, INS/OUTS (last 24 hours):  --------------------------------------------------------------------------------------  ICU Vital Signs Last 24 Hrs  T(C): 38.4 (05 Feb 2018 15:00), Max: 38.4 (05 Feb 2018 15:00)  T(F): 101.2 (05 Feb 2018 15:00), Max: 101.2 (05 Feb 2018 15:00)  HR: 106 (05 Feb 2018 15:30) (78 - 149)  BP: 97/60 (05 Feb 2018 15:30) (84/59 - 113/67)  BP(mean): 68 (05 Feb 2018 15:30) (62 - 76)  ABP: 73/61 (05 Feb 2018 14:00) (68/61 - 122/107)  ABP(mean): 65 (05 Feb 2018 14:00) (60 - 114)  RR: 23 (05 Feb 2018 15:30) (13 - 31)  SpO2: 98% (05 Feb 2018 15:30) (91% - 100%)  Wt(kg): 82    02-03 @ 07:01 - 02-04 @ 07:00  --------------------------------------------------------  IN:    IV PiggyBack: 950 mL    Lactated Ringers IV Bolus: 1000 mL    phenylephrine   Infusion: 189.5 mL    sodium chloride 0.9%: 700 mL    Sodium Chloride 0.9% IV Bolus: 2500 mL    sodium chloride 0.9%.: 750 mL  Total IN: 6089.5 mL    OUT:    Indwelling Catheter - Urethral: 2095 mL    Nasoenteral Tube: 70 mL  Total OUT: 2165 mL    Total NET: 3924.5 mL      02-04 @ 07:01  -  02-05 @ 00:17  --------------------------------------------------------  IN:    amiodarone Infusion: 199.8 mL    amiodarone Infusion: 100.2 mL    IV PiggyBack: 350 mL    phenylephrine   Infusion: 212.4 mL    phenylephrine   Infusion: 73.8 mL    sodium chloride 0.9%.: 1450 mL  Total IN: 2386.2 mL    OUT:    Indwelling Catheter - Urethral: 775 mL    Nasoenteral Tube: 150 mL  Total OUT: 925 mL    Total NET: 1461.2 mL    EXAM  --------------------------------------------------------------------------------------  NEUROLOGY: Minimal verbal ability, CN/motor/sensory grossly intact  HEENT: Normocephalic, atraumatic.  EOMI.   RESPIRATORY: Lungs clear to auscultation, Normal expansion/effort.    CARDIOVASCULAR: S1, S2.  Irregular heart rate. Tachycardia.   GI/NUTRITION: Abdomen soft, Non-tender, Non-distended. NGT on low suction; NPO; incision w/ surgical dressing over the abdomen, no sx of infection.   VASCULAR: Extremities warm, pink, well-perfused.  SCDs in place.  MUSCULOSKELETAL: All extremities moving spontaneously without limitations.   SKIN: Good skin turgor, no skin breakdown.     METABOLIC/FLUIDS/ELECTROLYTES: sodium chloride 0.9%. 1000 milliLiter(s) IV Continuous <Continuous>    HEMATOLOGIC  [x] DVT Prophylaxis: SCDs, enoxaparin Injectable 40 milliGRAM(s) SubCutaneous every 24 hours  Transfusions: 	[] PRBC	[] Platelets  [] FFP  [] Cryoprecipitate    INFECTIOUS DISEASE: piperacillin/tazobactam IVPB. 3.375 Gram(s) IV Intermittent every 8 hours    Tubes/Lines/Drains   [x] Peripheral IV x2 on Left  [x] Central Venous Line    [] R	[] L	[x] IJ	[] Fem	[] SC	Date Placed: 2/4/18  [x] Arterial Line		[x] R	[] L	[] Fem	[x] Rad	[] Ax	Date Placed: 2/4/18 --> poorly functioning  [] PICC:         		[] Midline	[] Mediport  [x] Urinary Catheter	Date Placed: 2/3/18  [x]NGT              	Date Placed: 2/4/18    LABS  --------------------------------------------------------------------------------------  CBC  ( 02-05 @ 04:50 )             11.5   8.54  )-----------( 274                   34.8     CBC (02-04 @ 07:30)                          11.4<L>                       6.47    )--------------(  255                                   34.8<L>    CBC (02-04 @ 01:35)                          9.9<L>                       4.29    )--------------(  183                                  30.1<L>    BMP (02-05 @ 14:50)           136  |  100  |  13                        Ca 7.4 <L>            ----------------------------<  89     Mg 2.0           4.4   |  26  |  0.84                        K 3.2    BMP (02-04 @ 07:30)             134<L>  |  100     |  10    			Ca 7.3<L>             ---------------------------------( 103<H>	Mg 2.5                4.4     |  26      |  0.74  			Ph 3.1     BMP (02-04 @ 01:35)             133<L>  |  100     |  14    		            Ca 6.8<L>             ---------------------------------( 99    	Mg 1.3<L>             4.4     |  24      |  0.77  			Ph 3.2       LFTs (02-03 @ 13:06)      TPro 6.9 / Alb 3.6 / TBili 0.9 / DBili -- / AST 22 / ALT 9 / AlkPhos 55    Coags (02-04 @ 07:30)  aPTT 31.0 / INR 1.41<H> / PT 16.3<H>  Coags (02-04 @ 01:35)  aPTT 28.9 / INR 1.54<H> / PT 17.9<H>    ABG (02-04 @ 10:51)     7.34<L> / 49<H> / 140<H> / 25 / 0.6 / 98.5%     Lactate:     ABG (02-04 @ 07:30)     7.32<L> / 51<H> / 88 / 24 / 0.0 / 96.2%     Lactate: 0.7      VBG (02-03 @ 16:58)     7.35 / 51 / 38 / 25 / 2.0 / 66.2%  VBG (02-03 @ 13:06)     7.36 / 56<H> / < 24<L> / 27 / 5.3 / 21.7<L>%    -------------------------------------------------------------------------------------  IMAGING RESULTS    GIOVANNI: February 4, 2018 at 09:54 AM  Conclusions:  Technically difficult study.  1. Normal mitral valve. Mild mitral regurgitation.  2. Normal trileaflet aortic valve. No aortic valve regurgitation seen.  3. Endocardium not well visualized; grossly low normal left  ventricular systolic function. Patient in atrial fibrillation with rapid ventricular rate; ejection fraction varies with R-R interval.  4. Right ventricle assessment limited by atrial fibrillation with rapid ventricular response and poor visualization of the RV; grossly the right ventircle appears normal size with midlly decreased systolic function.  5. Normal tricuspid valve.   Moderate tricuspid regurgitation.  6. Small pericardial effusion.     Portable CXR: February 5, 2018 at 12:11 AM  INTERPRETATION: Enteric tube and right IJ line in place. Heart is enlarged but stable. No focal consolidations. No pneumothorax or obvious effusions.  Right upper lobe atelectasis is totally resolved.  COMPARISON:  February 4    IMPRESSION:  Clear lungs post abdominal surgery with resolution of right upper lobe atelectasis.

## 2018-02-05 NOTE — PROGRESS NOTE ADULT - SUBJECTIVE AND OBJECTIVE BOX
Interval events:   - pt remains in Afib with rates 100-110    O:  ICU Vital Signs Last 24 Hrs  T(C): 37.6 (05 Feb 2018 08:00), Max: 37.6 (05 Feb 2018 08:00)  T(F): 99.7 (05 Feb 2018 08:00), Max: 99.7 (05 Feb 2018 08:00)  HR: 118 (05 Feb 2018 10:00) (110 - 122)  BP: 104/68 (05 Feb 2018 09:30) (84/59 - 113/67)  BP(mean): 76 (05 Feb 2018 09:30) (64 - 76)  ABP: 69/64 (05 Feb 2018 09:30) (69/64 - 115/61)  ABP(mean): 66 (05 Feb 2018 09:30) (60 - 90)  RR: 22 (05 Feb 2018 10:00) (11 - 31)  SpO2: 99% (05 Feb 2018 10:00) (91% - 100%)    Gen; NAD   CV: irreg rate and rhythm, nl S1 S2 no rmg  Pulm : CTAB  Ext WWP     MEDS  MEDICATIONS  (STANDING):  amiodarone Infusion 1 mG/Min (33.333 mL/Hr) IV Continuous <Continuous>  amiodarone Infusion 0.5 mG/Min (16.667 mL/Hr) IV Continuous <Continuous>  enoxaparin Injectable 40 milliGRAM(s) SubCutaneous every 24 hours  magnesium sulfate  IVPB 2 Gram(s) IV Intermittent daily  phenylephrine    Infusion 0.5 MICROgram(s)/kG/Min (15.375 mL/Hr) IV Continuous <Continuous>  piperacillin/tazobactam IVPB. 3.375 Gram(s) IV Intermittent every 8 hours  sodium chloride 0.9%. 1000 milliLiter(s) (100 mL/Hr) IV Continuous <Continuous>  valproate sodium IVPB 250 milliGRAM(s) IV Intermittent every 12 hours    LABS                        11.5   8.54  )-----------( 274      ( 05 Feb 2018 04:50 )             34.8   02-05    135  |  101  |  11  ----------------------------<  86  5.2   |  25  |  0.78    Ca    7.1<L>      05 Feb 2018 04:00  Phos  3.3     02-05  Mg     2.0     02-05    TPro  6.9  /  Alb  3.6  /  TBili  0.9  /  DBili  x   /  AST  22  /  ALT  9   /  AlkPhos  55  02-03

## 2018-02-06 LAB
BUN SERPL-MCNC: 12 MG/DL — SIGNIFICANT CHANGE UP (ref 7–23)
CALCIUM SERPL-MCNC: 7.1 MG/DL — LOW (ref 8.4–10.5)
CHLORIDE SERPL-SCNC: 102 MMOL/L — SIGNIFICANT CHANGE UP (ref 98–107)
CO2 SERPL-SCNC: 27 MMOL/L — SIGNIFICANT CHANGE UP (ref 22–31)
CREAT SERPL-MCNC: 0.78 MG/DL — SIGNIFICANT CHANGE UP (ref 0.5–1.3)
GLUCOSE SERPL-MCNC: 80 MG/DL — SIGNIFICANT CHANGE UP (ref 70–99)
HCT VFR BLD CALC: 30.3 % — LOW (ref 39–50)
HGB BLD-MCNC: 9.9 G/DL — LOW (ref 13–17)
MAGNESIUM SERPL-MCNC: 2 MG/DL — SIGNIFICANT CHANGE UP (ref 1.6–2.6)
MCHC RBC-ENTMCNC: 28 PG — SIGNIFICANT CHANGE UP (ref 27–34)
MCHC RBC-ENTMCNC: 32.7 % — SIGNIFICANT CHANGE UP (ref 32–36)
MCV RBC AUTO: 85.6 FL — SIGNIFICANT CHANGE UP (ref 80–100)
NRBC # FLD: 0 — SIGNIFICANT CHANGE UP
PHOSPHATE SERPL-MCNC: 2.7 MG/DL — SIGNIFICANT CHANGE UP (ref 2.5–4.5)
PLATELET # BLD AUTO: 284 K/UL — SIGNIFICANT CHANGE UP (ref 150–400)
PMV BLD: 10.2 FL — SIGNIFICANT CHANGE UP (ref 7–13)
POTASSIUM SERPL-MCNC: 4.1 MMOL/L — SIGNIFICANT CHANGE UP (ref 3.5–5.3)
POTASSIUM SERPL-SCNC: 4.1 MMOL/L — SIGNIFICANT CHANGE UP (ref 3.5–5.3)
RBC # BLD: 3.54 M/UL — LOW (ref 4.2–5.8)
RBC # FLD: 13.5 % — SIGNIFICANT CHANGE UP (ref 10.3–14.5)
SODIUM SERPL-SCNC: 139 MMOL/L — SIGNIFICANT CHANGE UP (ref 135–145)
SPECIMEN SOURCE: SIGNIFICANT CHANGE UP
SPECIMEN SOURCE: SIGNIFICANT CHANGE UP
WBC # BLD: 6.46 K/UL — SIGNIFICANT CHANGE UP (ref 3.8–10.5)
WBC # FLD AUTO: 6.46 K/UL — SIGNIFICANT CHANGE UP (ref 3.8–10.5)

## 2018-02-06 PROCEDURE — 99291 CRITICAL CARE FIRST HOUR: CPT | Mod: 24

## 2018-02-06 RX ORDER — AMIODARONE HYDROCHLORIDE 400 MG/1
200 TABLET ORAL DAILY
Qty: 0 | Refills: 0 | Status: DISCONTINUED | OUTPATIENT
Start: 2018-02-06 | End: 2018-02-06

## 2018-02-06 RX ORDER — ACETAMINOPHEN 500 MG
1000 TABLET ORAL ONCE
Qty: 0 | Refills: 0 | Status: COMPLETED | OUTPATIENT
Start: 2018-02-06 | End: 2018-02-06

## 2018-02-06 RX ORDER — SODIUM CHLORIDE 9 MG/ML
1000 INJECTION, SOLUTION INTRAVENOUS
Qty: 0 | Refills: 0 | Status: DISCONTINUED | OUTPATIENT
Start: 2018-02-06 | End: 2018-02-06

## 2018-02-06 RX ORDER — AMIODARONE HYDROCHLORIDE 400 MG/1
0.5 TABLET ORAL
Qty: 450 | Refills: 0 | Status: DISCONTINUED | OUTPATIENT
Start: 2018-02-06 | End: 2018-02-08

## 2018-02-06 RX ORDER — SODIUM CHLORIDE 9 MG/ML
1000 INJECTION, SOLUTION INTRAVENOUS
Qty: 0 | Refills: 0 | Status: DISCONTINUED | OUTPATIENT
Start: 2018-02-06 | End: 2018-02-08

## 2018-02-06 RX ORDER — AMIODARONE HYDROCHLORIDE 400 MG/1
400 TABLET ORAL
Qty: 0 | Refills: 0 | Status: DISCONTINUED | OUTPATIENT
Start: 2018-02-06 | End: 2018-02-06

## 2018-02-06 RX ORDER — CALCIUM GLUCONATE 100 MG/ML
1 VIAL (ML) INTRAVENOUS ONCE
Qty: 0 | Refills: 0 | Status: COMPLETED | OUTPATIENT
Start: 2018-02-06 | End: 2018-02-06

## 2018-02-06 RX ORDER — METOPROLOL TARTRATE 50 MG
5 TABLET ORAL EVERY 6 HOURS
Qty: 0 | Refills: 0 | Status: DISCONTINUED | OUTPATIENT
Start: 2018-02-06 | End: 2018-02-09

## 2018-02-06 RX ADMIN — Medication 2.5 MILLIGRAM(S): at 05:47

## 2018-02-06 RX ADMIN — Medication 5 MILLIGRAM(S): at 12:25

## 2018-02-06 RX ADMIN — AMIODARONE HYDROCHLORIDE 200 MILLIGRAM(S): 400 TABLET ORAL at 13:53

## 2018-02-06 RX ADMIN — SODIUM CHLORIDE 75 MILLILITER(S): 9 INJECTION, SOLUTION INTRAVENOUS at 23:34

## 2018-02-06 RX ADMIN — Medication 26.25 MILLIGRAM(S): at 23:33

## 2018-02-06 RX ADMIN — Medication 1000 MILLIGRAM(S): at 01:30

## 2018-02-06 RX ADMIN — AMIODARONE HYDROCHLORIDE 16.67 MG/MIN: 400 TABLET ORAL at 23:34

## 2018-02-06 RX ADMIN — PIPERACILLIN AND TAZOBACTAM 25 GRAM(S): 4; .5 INJECTION, POWDER, LYOPHILIZED, FOR SOLUTION INTRAVENOUS at 08:10

## 2018-02-06 RX ADMIN — Medication 5 MILLIGRAM(S): at 23:33

## 2018-02-06 RX ADMIN — AMIODARONE HYDROCHLORIDE 16.67 MG/MIN: 400 TABLET ORAL at 17:44

## 2018-02-06 RX ADMIN — Medication 200 GRAM(S): at 05:51

## 2018-02-06 RX ADMIN — PIPERACILLIN AND TAZOBACTAM 25 GRAM(S): 4; .5 INJECTION, POWDER, LYOPHILIZED, FOR SOLUTION INTRAVENOUS at 23:34

## 2018-02-06 RX ADMIN — ENOXAPARIN SODIUM 40 MILLIGRAM(S): 100 INJECTION SUBCUTANEOUS at 05:53

## 2018-02-06 RX ADMIN — Medication 26.25 MILLIGRAM(S): at 08:38

## 2018-02-06 RX ADMIN — PIPERACILLIN AND TAZOBACTAM 25 GRAM(S): 4; .5 INJECTION, POWDER, LYOPHILIZED, FOR SOLUTION INTRAVENOUS at 16:00

## 2018-02-06 RX ADMIN — Medication 400 MILLIGRAM(S): at 01:00

## 2018-02-06 RX ADMIN — SODIUM CHLORIDE 100 MILLILITER(S): 9 INJECTION INTRAMUSCULAR; INTRAVENOUS; SUBCUTANEOUS at 08:10

## 2018-02-06 RX ADMIN — Medication 5 MILLIGRAM(S): at 19:03

## 2018-02-06 NOTE — PROGRESS NOTE ADULT - SUBJECTIVE AND OBJECTIVE BOX
SICU AM PROGRESS NOTE   =====================================================    Overnight / Interval Events :     Pt has been off Surinder since yesterday . A line was discontinued . Ofirmev was given. On lopressor and amio gtt     Surgery Information:  Laparotomy ileocecectomy with pus    EBL: 50cc	    HPI:  33M PMHx autism (non-verbal at baseline), HTN, chronic umbilical hernia presented to [facility] ED the afternoon of 2/3/18 for perceived abd pain. Unknown how long hernia present, or whether congenital; never able to reduce. Recently presented to OSH 3 days prior to presentation ([facility] ED 1/31) complaining of 1 episode of vomiting, and sent home w/ dx of viral gastroenteritis. Since that episode patient has not vomited, but family have perceived that pt appears more uncomfortable in recent few days and has been hiccuping. His umbilical area also appeared to grow in size & the overlying skin became red. Recent URI sx including cough that have largely resolved. Denies fever. last BM the morning of presentation and in ED. Patient hospitalized ~2 years ago at [facility] (after eating a banana whole, including peel) for esophageal tear (no operative intervention) and was discharged on liquid then puree/mush diet for ~6months. Patient reportedly will eat foreign objects within reach.     In ED VS: T37.1, P107, /99, RR18, SpO2 95% on RA. Labs notable for 83% neutrophils, Na 131, Cl 90, BUN 24, Cr 1.4, venous lactate 1.6. CT showing incarcerated umbilical hernia containing bowel, possibly ischemic changes and foreign objects in hernia & R colon. Received 2L NS, vanc/zosyn. General Surgery Consulted. (03 Feb 2018 18:23)    Allergies: NKDA    PAST MEDICAL & SURGICAL HISTORY:  Hypertension  Autism spectrum disorder  Hx seizure (remote; per sister)  chronic umbilical hernia  paroxysmal AFib (pt had been prescribed eloquis in 2017 per PMD)  Gastritis  Pica w/ hx of esophageal rupture  No significant past surgical history    FAMILY HISTORY: No pertinent family history in first degree relatives    SOCIAL HISTORY:  N/A    ADVANCE DIRECTIVES: Presumed Full Code      REVIEW OF SYSTEMS:  See HPI. Otherwise, 10 point ROS done and otherwise negative    CURRENT MEDICATIONS:   --------------------------------------------------------------------------------------  Neurologic Medications:  acetaminophen  IVPB. 1000 milliGRAM(s) IV Intermittent once  valproate sodium IVPB 250 milliGRAM(s) IV Intermittent every 12 hours    Respiratory Medications:    Cardiovascular Medications:  amiodarone Infusion 0.5 mG/Min IV Continuous <Continuous>  metoprolol    tartrate Injectable 2.5 milliGRAM(s) IV Push every 6 hours  phenylephrine    Infusion 0.5 MICROgram(s)/kG/Min IV Continuous <Continuous>    Gastrointestinal Medications:  sodium chloride 0.9%. 1000 milliLiter(s) IV Continuous <Continuous>    Genitourinary Medications:    Hematologic/Oncologic Medications:  enoxaparin Injectable 40 milliGRAM(s) SubCutaneous every 24 hours    Antimicrobials/Immunologic Medications:  piperacillin/tazobactam IVPB. 3.375 Gram(s) IV Intermittent every 8 hours    Endocrine/Metabolic Medications:    Topical/Other Medications:            VITAL SIGNS, INS/OUTS (last 24 hours):  --------------------------------------------------------------------------------------    T(C): 36.7 (02-06-18 @ 00:00), Max: 38.4 (02-05-18 @ 15:00)  HR: 140 (02-06-18 @ 00:00) (75 - 149)  BP: 103/57 (02-06-18 @ 00:00) (84/59 - 121/58)  BP(mean): 68 (02-06-18 @ 00:00) (61 - 80)  ABP: 73/61 (02-05-18 @ 14:00) (68/61 - 122/107)  ABP(mean): 65 (02-05-18 @ 14:00) (63 - 114)  RR: 19 (02-06-18 @ 00:00) (14 - 24)  SpO2: 96% (02-06-18 @ 00:00) (91% - 100%)  Wt(kg): --  CVP(mm Hg): --  CI: --  CAPILLARY BLOOD GLUCOSE       N/A      02-04 @ 07:01  -  02-05 @ 07:00  --------------------------------------------------------  IN:    amiodarone Infusion: 199.8 mL    amiodarone Infusion: 250.5 mL    IV PiggyBack: 350 mL    phenylephrine   Infusion: 73.8 mL    phenylephrine   Infusion: 320.4 mL    sodium chloride 0.9%.: 2350 mL  Total IN: 3544.5 mL    OUT:    Indwelling Catheter - Urethral: 1095 mL    Nasoenteral Tube: 160 mL  Total OUT: 1255 mL    Total NET: 2289.5 mL      02-05 @ 07:01  -  02-06 @ 01:49  --------------------------------------------------------  IN:    amiodarone Infusion: 83.5 mL    amiodarone Infusion: 200.4 mL    IV PiggyBack: 750 mL    phenylephrine   Infusion: 41.2 mL    sodium chloride 0.9%.: 1650 mL  Total IN: 2725.1 mL    OUT:    Indwelling Catheter - Urethral: 565 mL    Nasoenteral Tube: 600 mL  Total OUT: 1165 mL    Total NET: 1560.1 mL                EXAM  --------------------------------------------------------------------------------------  NEUROLOGY: Minimal verbal ability, CN/motor/sensory grossly intact  HEENT: Normocephalic, atraumatic.  EOMI.   RESPIRATORY: Lungs clear to auscultation, Normal expansion/effort.    CARDIOVASCULAR: S1, S2.  Irregular heart rate. Tachycardia.   GI/NUTRITION: Abdomen soft, Non-tender, Non-distended. NGT on low suction; NPO; incision w/ surgical dressing over the abdomen, no sx of infection.   VASCULAR: Extremities warm, pink, well-perfused.  SCDs in place.  MUSCULOSKELETAL: All extremities moving spontaneously without limitations.   SKIN: Good skin turgor, no skin breakdown.               ============================================================    Tubes/Lines/Drains   [x] Peripheral IV x2 on Left  [x] Central Venous Line    [] R	[] L	[x] IJ	[] Fem	[] SC	Date Placed: 2/4/18  [] Arterial Line		[] R	[] L	[] Fem	[x] Rad	[] Ax	Date Placed:   [] PICC:         		[] Midline	[] Mediport  [x] Urinary Catheter	Date Placed: 2/3/18  [x]NGT              	Date Placed: 2/4/18    LABS  --------------------------------------------------------------------------------------          -------------------------------------------------------------------------------------  IMAGING RESULTS    GIOVANNI: February 4, 2018 at 09:54 AM  Conclusions:  Technically difficult study.  1. Normal mitral valve. Mild mitral regurgitation.  2. Normal trileaflet aortic valve. No aortic valve regurgitation seen.  3. Endocardium not well visualized; grossly low normal left  ventricular systolic function. Patient in atrial fibrillation with rapid ventricular rate; ejection fraction varies with R-R interval.  4. Right ventricle assessment limited by atrial fibrillation with rapid ventricular response and poor visualization of the RV; grossly the right ventircle appears normal size with midlly decreased systolic function.  5. Normal tricuspid valve.   Moderate tricuspid regurgitation.  6. Small pericardial effusion.     Portable CXR: February 5, 2018 at 12:11 AM  INTERPRETATION: Enteric tube and right IJ line in place. Heart is enlarged but stable. No focal consolidations. No pneumothorax or obvious effusions.  Right upper lobe atelectasis is totally resolved.  COMPARISON:  February 4    IMPRESSION:  Clear lungs post abdominal surgery with resolution of right upper lobe atelectasis. SICU AM PROGRESS NOTE   =====================================================    Overnight / Interval Events :     Pt has been off Surinder since yesterday . A line was discontinued . Ofirmev was given. On lopressor and amio gtt.     Surgery Information:  Laparotomy ileocecectomy with pus    EBL: 50cc	    HPI:  33M PMHx autism (non-verbal at baseline), HTN, chronic umbilical hernia presented to [facility] ED the afternoon of 2/3/18 for perceived abd pain. Unknown how long hernia present, or whether congenital; never able to reduce. Recently presented to OSH 3 days prior to presentation ([facility] ED 1/31) complaining of 1 episode of vomiting, and sent home w/ dx of viral gastroenteritis. Since that episode patient has not vomited, but family have perceived that pt appears more uncomfortable in recent few days and has been hiccuping. His umbilical area also appeared to grow in size & the overlying skin became red. Recent URI sx including cough that have largely resolved. Denies fever. last BM the morning of presentation and in ED. Patient hospitalized ~2 years ago at [facility] (after eating a banana whole, including peel) for esophageal tear (no operative intervention) and was discharged on liquid then puree/mush diet for ~6months. Patient reportedly will eat foreign objects within reach.     In ED VS: T37.1, P107, /99, RR18, SpO2 95% on RA. Labs notable for 83% neutrophils, Na 131, Cl 90, BUN 24, Cr 1.4, venous lactate 1.6. CT showing incarcerated umbilical hernia containing bowel, possibly ischemic changes and foreign objects in hernia & R colon. Received 2L NS, vanc/zosyn. General Surgery Consulted. (03 Feb 2018 18:23)    Allergies: NKDA    PAST MEDICAL & SURGICAL HISTORY:  Hypertension  Autism spectrum disorder  Hx seizure (remote; per sister)  chronic umbilical hernia  paroxysmal AFib (pt had been prescribed eloquis in 2017 per PMD)  Gastritis  Pica w/ hx of esophageal rupture  No significant past surgical history    FAMILY HISTORY: No pertinent family history in first degree relatives    SOCIAL HISTORY:  N/A    ADVANCE DIRECTIVES: Presumed Full Code      REVIEW OF SYSTEMS:  See HPI. Otherwise, 10 point ROS done and otherwise negative    CURRENT MEDICATIONS:   --------------------------------------------------------------------------------------  Neurologic Medications:  acetaminophen  IVPB. 1000 milliGRAM(s) IV Intermittent once  valproate sodium IVPB 250 milliGRAM(s) IV Intermittent every 12 hours    Respiratory Medications:    Cardiovascular Medications:  amiodarone Infusion 0.5 mG/Min IV Continuous <Continuous>  metoprolol    tartrate Injectable 2.5 milliGRAM(s) IV Push every 6 hours  phenylephrine    Infusion 0.5 MICROgram(s)/kG/Min IV Continuous <Continuous>    Gastrointestinal Medications:  sodium chloride 0.9%. 1000 milliLiter(s) IV Continuous <Continuous>    Genitourinary Medications:    Hematologic/Oncologic Medications:  enoxaparin Injectable 40 milliGRAM(s) SubCutaneous every 24 hours    Antimicrobials/Immunologic Medications:  piperacillin/tazobactam IVPB. 3.375 Gram(s) IV Intermittent every 8 hours    Endocrine/Metabolic Medications:    Topical/Other Medications:            VITAL SIGNS, INS/OUTS (last 24 hours):  --------------------------------------------------------------------------------------    T(C): 36.7 (02-06-18 @ 00:00), Max: 38.4 (02-05-18 @ 15:00)  HR: 140 (02-06-18 @ 00:00) (75 - 149)  BP: 103/57 (02-06-18 @ 00:00) (84/59 - 121/58)  BP(mean): 68 (02-06-18 @ 00:00) (61 - 80)  ABP: 73/61 (02-05-18 @ 14:00) (68/61 - 122/107)  ABP(mean): 65 (02-05-18 @ 14:00) (63 - 114)  RR: 19 (02-06-18 @ 00:00) (14 - 24)  SpO2: 96% (02-06-18 @ 00:00) (91% - 100%)  Wt(kg): --  CVP(mm Hg): --  CI: --  CAPILLARY BLOOD GLUCOSE       N/A      02-04 @ 07:01  -  02-05 @ 07:00  --------------------------------------------------------  IN:    amiodarone Infusion: 199.8 mL    amiodarone Infusion: 250.5 mL    IV PiggyBack: 350 mL    phenylephrine   Infusion: 73.8 mL    phenylephrine   Infusion: 320.4 mL    sodium chloride 0.9%.: 2350 mL  Total IN: 3544.5 mL    OUT:    Indwelling Catheter - Urethral: 1095 mL    Nasoenteral Tube: 160 mL  Total OUT: 1255 mL    Total NET: 2289.5 mL      02-05 @ 07:01  -  02-06 @ 01:49  --------------------------------------------------------  IN:    amiodarone Infusion: 83.5 mL    amiodarone Infusion: 200.4 mL    IV PiggyBack: 750 mL    phenylephrine   Infusion: 41.2 mL    sodium chloride 0.9%.: 1650 mL  Total IN: 2725.1 mL    OUT:    Indwelling Catheter - Urethral: 565 mL    Nasoenteral Tube: 600 mL  Total OUT: 1165 mL    Total NET: 1560.1 mL                EXAM  --------------------------------------------------------------------------------------  NEUROLOGY: Minimal verbal ability, CN/motor/sensory grossly intact  HEENT: Normocephalic, atraumatic.  EOMI.   RESPIRATORY: Lungs clear to auscultation, Normal expansion/effort.    CARDIOVASCULAR: S1, S2.  Irregular heart rate. Tachycardia.   GI/NUTRITION: Abdomen soft, Non-tender, Non-distended. NGT on low suction; NPO; incision w/ surgical dressing over the abdomen, no sx of infection.   VASCULAR: Extremities warm, pink, well-perfused.  SCDs in place.  MUSCULOSKELETAL: All extremities moving spontaneously without limitations.   SKIN: Good skin turgor, no skin breakdown.               ============================================================    Tubes/Lines/Drains   [x] Peripheral IV x2 on Left  [x] Central Venous Line    [] R	[] L	[x] IJ	[] Fem	[] SC	Date Placed: 2/4/18  [] Arterial Line		[] R	[] L	[] Fem	[x] Rad	[] Ax	Date Placed:   [] PICC:         		[] Midline	[] Mediport  [x] Urinary Catheter	Date Placed: 2/3/18  [x]NGT              	Date Placed: 2/4/18    LABS  --------------------------------------------------------------------------------------          -------------------------------------------------------------------------------------  IMAGING RESULTS    GIOVANNI: February 4, 2018 at 09:54 AM  Conclusions:  Technically difficult study.  1. Normal mitral valve. Mild mitral regurgitation.  2. Normal trileaflet aortic valve. No aortic valve regurgitation seen.  3. Endocardium not well visualized; grossly low normal left  ventricular systolic function. Patient in atrial fibrillation with rapid ventricular rate; ejection fraction varies with R-R interval.  4. Right ventricle assessment limited by atrial fibrillation with rapid ventricular response and poor visualization of the RV; grossly the right ventircle appears normal size with midlly decreased systolic function.  5. Normal tricuspid valve.   Moderate tricuspid regurgitation.  6. Small pericardial effusion.     Portable CXR: February 5, 2018 at 12:11 AM  INTERPRETATION: Enteric tube and right IJ line in place. Heart is enlarged but stable. No focal consolidations. No pneumothorax or obvious effusions.  Right upper lobe atelectasis is totally resolved.  COMPARISON:  February 4    IMPRESSION:  Clear lungs post abdominal surgery with resolution of right upper lobe atelectasis.

## 2018-02-06 NOTE — PROGRESS NOTE ADULT - ASSESSMENT
ASSESSMENT:  33M w/ Autism and paroxysmal afib not on AC POD#2 s/p laporatomy ileocecectomy post-operative course complicated by hypotension and atrial fibrillation w/ RVR.      PLAN:    Neurologic: Pain control PRN. Restart home regimen of Risperidone and Depakote when tolerating PO.    Respiratory: Wean NC as tolerated.   Cardiovascular: Afib intermittently w/ RVR and patient also converts to NSR. On amio and lopressor   Gastrointestinal/Nutrition: NPO, c/w NS@ 100, give NS bolus as patient appears dehydrated.  Renal/Genitourinary: Electrolytes WNL, c/w IV fluids as above.   Hematologic: Monitor H/H, VTE ppx with SQH, SCDs. Will consult PCP/ primary team as to why patient was not on anticoagulation, possible to start AC.  Infectious Disease: c/w Zosyn for gangrenous bowel/ ileal perforation. f/u Blood culture   Tubes/Lines/Drains: NGT, IJ TLC, left periph IV x2, Myrick.  Endocrine: Monitor blood sugars, finger sticks Q6H.   Disposition: SICU.     --------------------------------------------------------------------------------------    Critical Care Diagnoses: Post op complication, Hypotension, Sepsis, Gangrenous Bowel ASSESSMENT:  33M w/ Autism and paroxysmal afib not on AC POD#2 s/p laporatomy ileocecectomy post-operative course complicated by hypotension and atrial fibrillation w/ RVR.      PLAN:    Neurologic: Pain control PRN. Restart home regimen of Risperidone and Depakote when tolerating PO.    Respiratory: Wean NC as tolerated.   Cardiovascular: Afib intermittently w/ RVR and patient also converts to NSR. C/w Amiodarone and Lopressor, increase to 5 mg Q8H.   Gastrointestinal/Nutrition: Maintain NPO and NGT as bowel function net yet, c/w NS@ 75 mL/hr.   Renal/Genitourinary: Na improved, c/w IV fluids as above. Will add D5 to IV fluids. D/c Myrick.   Hematologic: Monitor H/H, VTE ppx with SQH, SCDs. Will consult Neurologist as to why patient was not on anticoagulation, possible to start AC.  Infectious Disease: c/w Zosyn for gangrenous bowel/ ileal perforation, scheduled to stop after 4 days. f/u Blood culture   Tubes/Lines/Drains: NGT, IJ TLC, left peripheral IV x2, Myrick.  Endocrine: Monitor blood sugars, finger sticks Q6H.   Disposition: SICU.     --------------------------------------------------------------------------------------    Critical Care Diagnoses: Post op complication, Hypotension, Sepsis, Gangrenous Bowel

## 2018-02-06 NOTE — DIETITIAN INITIAL EVALUATION ADULT. - OTHER INFO
Pt seen for critical care nutrition LOS.  At this time, pt remains NPO (x 3days) with NGT to LCS.  Pt had 1250ml output x24h.  Pt s/p surgery for laparotomy and ileocecectomy.  Unable to obtain nutrition hx at this time - family members not available.

## 2018-02-07 LAB
BUN SERPL-MCNC: 15 MG/DL — SIGNIFICANT CHANGE UP (ref 7–23)
CALCIUM SERPL-MCNC: 7.5 MG/DL — LOW (ref 8.4–10.5)
CHLORIDE SERPL-SCNC: 102 MMOL/L — SIGNIFICANT CHANGE UP (ref 98–107)
CO2 SERPL-SCNC: 25 MMOL/L — SIGNIFICANT CHANGE UP (ref 22–31)
CREAT SERPL-MCNC: 0.76 MG/DL — SIGNIFICANT CHANGE UP (ref 0.5–1.3)
GLUCOSE SERPL-MCNC: 90 MG/DL — SIGNIFICANT CHANGE UP (ref 70–99)
HCT VFR BLD CALC: 31.2 % — LOW (ref 39–50)
HGB BLD-MCNC: 9.9 G/DL — LOW (ref 13–17)
MAGNESIUM SERPL-MCNC: 1.8 MG/DL — SIGNIFICANT CHANGE UP (ref 1.6–2.6)
MCHC RBC-ENTMCNC: 26.8 PG — LOW (ref 27–34)
MCHC RBC-ENTMCNC: 31.7 % — LOW (ref 32–36)
MCV RBC AUTO: 84.6 FL — SIGNIFICANT CHANGE UP (ref 80–100)
NRBC # FLD: 0 — SIGNIFICANT CHANGE UP
PHOSPHATE SERPL-MCNC: 2.6 MG/DL — SIGNIFICANT CHANGE UP (ref 2.5–4.5)
PLATELET # BLD AUTO: 339 K/UL — SIGNIFICANT CHANGE UP (ref 150–400)
PMV BLD: 9.6 FL — SIGNIFICANT CHANGE UP (ref 7–13)
POTASSIUM SERPL-MCNC: 3.5 MMOL/L — SIGNIFICANT CHANGE UP (ref 3.5–5.3)
POTASSIUM SERPL-SCNC: 3.5 MMOL/L — SIGNIFICANT CHANGE UP (ref 3.5–5.3)
RBC # BLD: 3.69 M/UL — LOW (ref 4.2–5.8)
RBC # FLD: 13.6 % — SIGNIFICANT CHANGE UP (ref 10.3–14.5)
SODIUM SERPL-SCNC: 140 MMOL/L — SIGNIFICANT CHANGE UP (ref 135–145)
SURGICAL PATHOLOGY STUDY: SIGNIFICANT CHANGE UP
WBC # BLD: 6.35 K/UL — SIGNIFICANT CHANGE UP (ref 3.8–10.5)
WBC # FLD AUTO: 6.35 K/UL — SIGNIFICANT CHANGE UP (ref 3.8–10.5)

## 2018-02-07 PROCEDURE — 99233 SBSQ HOSP IP/OBS HIGH 50: CPT

## 2018-02-07 RX ORDER — RISPERIDONE 4 MG/1
1 TABLET ORAL AT BEDTIME
Qty: 0 | Refills: 0 | Status: DISCONTINUED | OUTPATIENT
Start: 2018-02-07 | End: 2018-02-12

## 2018-02-07 RX ORDER — DIVALPROEX SODIUM 500 MG/1
750 TABLET, DELAYED RELEASE ORAL AT BEDTIME
Qty: 0 | Refills: 0 | Status: DISCONTINUED | OUTPATIENT
Start: 2018-02-07 | End: 2018-02-12

## 2018-02-07 RX ORDER — TRAZODONE HCL 50 MG
150 TABLET ORAL AT BEDTIME
Qty: 0 | Refills: 0 | Status: DISCONTINUED | OUTPATIENT
Start: 2018-02-07 | End: 2018-02-12

## 2018-02-07 RX ORDER — ACETAMINOPHEN 500 MG
1000 TABLET ORAL ONCE
Qty: 0 | Refills: 0 | Status: COMPLETED | OUTPATIENT
Start: 2018-02-07 | End: 2018-02-07

## 2018-02-07 RX ORDER — RISPERIDONE 4 MG/1
1 TABLET ORAL
Qty: 0 | Refills: 0 | Status: DISCONTINUED | OUTPATIENT
Start: 2018-02-07 | End: 2018-02-12

## 2018-02-07 RX ORDER — TEMAZEPAM 15 MG/1
15 CAPSULE ORAL AT BEDTIME
Qty: 0 | Refills: 0 | Status: DISCONTINUED | OUTPATIENT
Start: 2018-02-07 | End: 2018-02-12

## 2018-02-07 RX ADMIN — Medication 150 MILLIGRAM(S): at 21:04

## 2018-02-07 RX ADMIN — SODIUM CHLORIDE 75 MILLILITER(S): 9 INJECTION, SOLUTION INTRAVENOUS at 17:28

## 2018-02-07 RX ADMIN — Medication 400 MILLIGRAM(S): at 01:15

## 2018-02-07 RX ADMIN — SODIUM CHLORIDE 75 MILLILITER(S): 9 INJECTION, SOLUTION INTRAVENOUS at 08:27

## 2018-02-07 RX ADMIN — DIVALPROEX SODIUM 750 MILLIGRAM(S): 500 TABLET, DELAYED RELEASE ORAL at 21:04

## 2018-02-07 RX ADMIN — AMIODARONE HYDROCHLORIDE 16.67 MG/MIN: 400 TABLET ORAL at 08:28

## 2018-02-07 RX ADMIN — Medication 5 MILLIGRAM(S): at 06:03

## 2018-02-07 RX ADMIN — PIPERACILLIN AND TAZOBACTAM 25 GRAM(S): 4; .5 INJECTION, POWDER, LYOPHILIZED, FOR SOLUTION INTRAVENOUS at 08:44

## 2018-02-07 RX ADMIN — TEMAZEPAM 15 MILLIGRAM(S): 15 CAPSULE ORAL at 20:20

## 2018-02-07 RX ADMIN — Medication 26.25 MILLIGRAM(S): at 09:51

## 2018-02-07 RX ADMIN — AMIODARONE HYDROCHLORIDE 16.67 MG/MIN: 400 TABLET ORAL at 17:28

## 2018-02-07 RX ADMIN — Medication 5 MILLIGRAM(S): at 16:42

## 2018-02-07 RX ADMIN — ENOXAPARIN SODIUM 40 MILLIGRAM(S): 100 INJECTION SUBCUTANEOUS at 06:03

## 2018-02-07 RX ADMIN — RISPERIDONE 1 MILLIGRAM(S): 4 TABLET ORAL at 21:04

## 2018-02-07 RX ADMIN — PIPERACILLIN AND TAZOBACTAM 25 GRAM(S): 4; .5 INJECTION, POWDER, LYOPHILIZED, FOR SOLUTION INTRAVENOUS at 16:42

## 2018-02-07 RX ADMIN — Medication 5 MILLIGRAM(S): at 11:46

## 2018-02-07 NOTE — PROGRESS NOTE ADULT - ASSESSMENT
ASSESSMENT:  33M w/ Autism and paroxysmal afib not on AC POD#2 s/p laporatomy ileocecectomy post-operative course complicated by hypotension and atrial fibrillation w/ RVR.      PLAN:    Neurologic: On valproate sodium for seizure prophylaxis , PRN pain control   Respiratory: no acute issues   Cardiovascular: On Lopressor 5 Q 6h  and amio gtt ( 0.5 mg/ min )   Gastrointestinal/Nutrition: NPO/ NGT , On D5 NS @ 75 ,  Renal/Genitourinary: trend lytes replete as needed , monitor I and  O   Heme: On Lovenox   Infectious Disease: On Zosyn   Tubes/Lines/Drains: NGT , PIV  Endocrine:   Disposition: SICU,   --------------------------------------------------------------------------------------    Critical Care Diagnoses: Post op complication, Hypotension, Sepsis, Gangrenous Bowel

## 2018-02-07 NOTE — PROGRESS NOTE ADULT - SUBJECTIVE AND OBJECTIVE BOX
A Team Surgery Daily Progress Note    SUBJECTIVE:  Continues on Amiodarone drip for loading. Rate controlled overnight.   Now having diarrhea. Rectal tube placed.  Passed trial of void. Condom catheter in place.     OBJECTIVE:     ** VITAL SIGNS / I&O's **    T(C): 36.5 (02-07-18 @ 08:00), Max: 37.1 (02-06-18 @ 20:00)  T(F): 97.7 (02-07-18 @ 08:00), Max: 98.7 (02-06-18 @ 20:00)  HR: 71 (02-07-18 @ 09:00) (71 - 87)  BP: 126/72 (02-07-18 @ 09:00) (117/66 - 146/67)  RR: 15 (02-07-18 @ 09:00) (15 - 24)  SpO2: 100% (02-07-18 @ 09:00) (77% - 100%)      06 Feb 2018 07:01  -  07 Feb 2018 07:00  --------------------------------------------------------  IN:    amiodarone Infusion: 50.1 mL    amiodarone Infusion: 217.1 mL    dextrose 5% + sodium chloride 0.9%.: 1425 mL    IV PiggyBack: 300 mL    sodium chloride 0.9%: 200 mL  Total IN: 2192.2 mL    OUT:    Incontinent per Condom Catheter: 450 mL    Indwelling Catheter - Urethral: 155 mL    Nasoenteral Tube: 1000 mL  Total OUT: 1605 mL    Total NET: 587.2 mL      07 Feb 2018 07:01  -  07 Feb 2018 09:56  --------------------------------------------------------  IN:    amiodarone Infusion: 33.4 mL    dextrose 5% + sodium chloride 0.9%.: 150 mL    IV PiggyBack: 100 mL  Total IN: 283.4 mL    OUT:    Incontinent per Condom Catheter: 75 mL  Total OUT: 75 mL    Total NET: 208.4 mL          ** PHYSICAL EXAM **    -- CONSTITUTIONAL: awake, does not respond to questions, makes eye contact, at neurologic baseline    -- CARDIOVASCULAR: normotensive, regular rate   -- RESPIRATORY: breathing comfortably   -- ABDOMEN: soft, nontender, nondistended, incision clean, open portions of wound without signs of infection    ** LABS **                 9.9    6.35   )----------(  339       ( 07 Feb 2018 02:55 )               31.2      140    |  102    |  15     ----------------------------<  90         ( 07 Feb 2018 02:55 )  3.5     |  25     |  0.76     Ca    7.5        ( 07 Feb 2018 02:55 )  Phos  2.6       ( 07 Feb 2018 02:55 )  Mg     1.8       ( 07 Feb 2018 02:55 )          CAPILLARY BLOOD GLUCOSE

## 2018-02-07 NOTE — PROGRESS NOTE ADULT - ASSESSMENT
ASSESSMENT  CHERYL MCCORMACK is a 33y Male POD#4 from laparotomy, ileocecectomy. Continues on amiodarone drip, but will transition fully to oral medication. Now having flatus and BM.     PLAN:  - Amiodarone gtt for atrial fibrillation; will transition to oral medication   - Zosyn  - NPO, NGT, IVF; consider d/c NGT now that the patient is having GI function  - Monitor UOP  - SICU care

## 2018-02-07 NOTE — PROGRESS NOTE ADULT - ATTENDING COMMENTS
33M w/ Autism and paroxysmal afib not on AC POD#2 s/p laporatomy ileocecectomy post-operative course complicated by hypotension and atrial fibrillation w/ RVR.      PLAN:    Neurologic: On valproate sodium for seizure prophylaxis , PRN pain control   Respiratory: no acute issues   Cardiovascular: On Lopressor 5 Q 6h  and amio gtt ( 0.5 mg/ min )   Gastrointestinal/Nutrition: NPO/ NGT , On D5 NS @ 75 ,  Renal/Genitourinary: trend lytes replete as needed , monitor I and  O   Heme: On Lovenox   Infectious Disease: On Zosyn   Tubes/Lines/Drains: NGT , PIV  Endocrine:   Disposition: D/C floor   --------------------------------------------------------------------------------------    Critical Care Diagnoses: Post op Hypotension, Sepsis, Gangrenous Bowel, repiratory abnormality, Afib.    The patient is a critical care patient with hemodynamic and metabolic instability in SICU.  I have personally interviewed and examined this patient, reviewed labs and x-rays, discussed with other consultants, House staff and PA's.  I spent  45   minutes  in total providing critical care for the diagnoses, assessment and plan above.  These diagnoses are unrelated to the surgical procedure noted above.  I met with family     min to get further history and make care decisions for this patient who is unable to participate due to altered mental status.  Time involved in performance of separately billable procedures was not counted toward my critical care time.  There is no overlap.
I have personally interviewed and examined this patient, reviewed pertinent labs and imaging, and discussed the case with colleagues, residents, physician assistants, and nurses on SICU rounds.      35   minutes in total were spent in providing direct critical care for the diagnoses, assessment and plan outlined below.  These diagnoses are unrelated to the surgical procedure.  Additionally, time spent in the performance of separately billable procedures was not counted toward the critical care time.  There is no overlap.    The active critical care issues are:  1. afib with RVR, improving with fluid and beta-blocker  2. post-op ileus, at-risk for malnutrition    Increase beta-blocker dose.  Add dextrose to IVF.  Continue discussions with neurologist/surgical team regarding A-C for afib.
I have personally interviewed and examined this patient, reviewed pertinent labs and imaging, and discussed the case with colleagues, residents, physician assistants, and nurses on SICU rounds.      35   minutes in total were spent in providing direct critical care for the diagnoses, assessment and plan outlined below.  These diagnoses are unrelated to the surgical procedure.  Additionally, time spent in the performance of separately billable procedures was not counted toward the critical care time.  There is no overlap.    The active critical care issues are:  1. afib with RVR and hypotension  2. likely dehydration exacerbating hypotension  3. hyponatremia, at-risk for malnutrition    Will fluid challenge and hope to add low dose beta blocker for rate control once BP improved.  Discuss with PCP and surgical team risks versus benefits of starting therapeutic anti-coagulant for persistent afib and thromboembolic risk.  Start dextrose containing IVF once sodium normalizes with normal saline infusion.

## 2018-02-08 ENCOUNTER — TRANSCRIPTION ENCOUNTER (OUTPATIENT)
Age: 33
End: 2018-02-08

## 2018-02-08 RX ORDER — AMIODARONE HYDROCHLORIDE 400 MG/1
400 TABLET ORAL
Qty: 0 | Refills: 0 | Status: COMPLETED | OUTPATIENT
Start: 2018-02-08 | End: 2018-02-11

## 2018-02-08 RX ORDER — AMIODARONE HYDROCHLORIDE 400 MG/1
400 TABLET ORAL
Qty: 0 | Refills: 0 | Status: DISCONTINUED | OUTPATIENT
Start: 2018-02-08 | End: 2018-02-08

## 2018-02-08 RX ORDER — SODIUM CHLORIDE 9 MG/ML
1000 INJECTION, SOLUTION INTRAVENOUS
Qty: 0 | Refills: 0 | Status: DISCONTINUED | OUTPATIENT
Start: 2018-02-08 | End: 2018-02-08

## 2018-02-08 RX ADMIN — Medication 5 MILLIGRAM(S): at 17:26

## 2018-02-08 RX ADMIN — SODIUM CHLORIDE 125 MILLILITER(S): 9 INJECTION, SOLUTION INTRAVENOUS at 03:00

## 2018-02-08 RX ADMIN — RISPERIDONE 1 MILLIGRAM(S): 4 TABLET ORAL at 09:16

## 2018-02-08 RX ADMIN — Medication 5 MILLIGRAM(S): at 05:51

## 2018-02-08 RX ADMIN — DIVALPROEX SODIUM 750 MILLIGRAM(S): 500 TABLET, DELAYED RELEASE ORAL at 23:11

## 2018-02-08 RX ADMIN — ENOXAPARIN SODIUM 40 MILLIGRAM(S): 100 INJECTION SUBCUTANEOUS at 06:01

## 2018-02-08 RX ADMIN — Medication 5 MILLIGRAM(S): at 00:16

## 2018-02-08 RX ADMIN — AMIODARONE HYDROCHLORIDE 400 MILLIGRAM(S): 400 TABLET ORAL at 17:26

## 2018-02-08 RX ADMIN — Medication 5 MILLIGRAM(S): at 12:37

## 2018-02-08 RX ADMIN — Medication 5 MILLIGRAM(S): at 23:11

## 2018-02-08 RX ADMIN — Medication 150 MILLIGRAM(S): at 23:11

## 2018-02-08 RX ADMIN — RISPERIDONE 1 MILLIGRAM(S): 4 TABLET ORAL at 23:11

## 2018-02-08 NOTE — DISCHARGE NOTE ADULT - HOME CARE AGENCY
[facility] for [facility]-resumption of services. [facility] [phone] the nurse will visit a day after discharge. The nurse will call prior to visit. [facility] for RN services-resumption of services.

## 2018-02-08 NOTE — DISCHARGE NOTE ADULT - ADDITIONAL INSTRUCTIONS
Please follow up with your cardiologist Dr. Houston Diaz in 1 week     WOUND CARE:  Please change abdominal dressing daily. Apply a wet to dry dressing to incision. Cover with an abdominal pad and tape.

## 2018-02-08 NOTE — DISCHARGE NOTE ADULT - PATIENT PORTAL LINK FT
You can access the [facility], offered by [facility], by registering with the following website: [email or web address]

## 2018-02-08 NOTE — DISCHARGE NOTE ADULT - CARE PROVIDERS DIRECT ADDRESSES
[email or web address]

## 2018-02-08 NOTE — DISCHARGE NOTE ADULT - MEDICATION SUMMARY - MEDICATIONS TO TAKE
I will START or STAY ON the medications listed below when I get home from the hospital:    Multaq 400 mg oral tablet  -- 1 tab(s) by mouth 2 times a day  -- Indication: For HTN    amiodarone 200 mg oral tablet  -- 1 tab(s) by mouth once a day   -- Indication: For Afib    divalproex sodium 500 mg oral tablet, extended release  -- 1 tab(s) by mouth once a day (at bedtime)  -- Indication: For seizure    traZODone 150 mg oral tablet  -- 1 tab(s) by mouth once a day (at bedtime)  -- Indication: For Insomnia     risperiDONE 1 mg oral tablet  -- 1 tab(s) by mouth 2 times a day  -- Indication: For Antipsychotics    temazepam 15 mg oral capsule  -- 1 cap(s) by mouth once a day (at bedtime), As Needed  -- Indication: For Anxiolytic    Metoprolol Succinate  mg oral tablet, extended release  -- 1 tab(s) by mouth once a day  -- Indication: For HTN

## 2018-02-08 NOTE — DISCHARGE NOTE ADULT - PLAN OF CARE
s/p ventral hernia with gangrene due to Cardona's hernia and foreign body s/p lap ileocecectomy WOUND CARE:  Please keep incisions clean and dry. Please do not Scrub or rub incisions. Do not use lotion or powder on incisions.   BATHING: You may shower and/or sponge bathe. You may use warm soapy water in the shower and rinse, pat dry.  ACTIVITY: No heavy lifting or straining. Otherwise, you may return to your usual level of physical activity. If you are taking narcotic pain medication DO NOT drive a car, operate machinery or make important decisions.  DIET: Return to your usual diet.  NOTIFY YOUR SURGEON IF: You have any bleeding that does not stop, any pus draining from your wound(s), increased pain at surgical site, any fever (over 100.4 F) persistent nausea/vomiting, or if your pain is not controlled on your discharge pain medications.  Please follow up with your primary care physician in 1-2 weeks regarding your hospitalization.  Please follow up with your surgeon, Dr. Beckford in 1 week. Please call [phone] to make an appointment

## 2018-02-08 NOTE — PROGRESS NOTE ADULT - SUBJECTIVE AND OBJECTIVE BOX
Interval events:  - no major complaints    O:  Vital Signs Last 24 Hrs  T(C): 36.7 (08 Feb 2018 12:35), Max: 36.8 (08 Feb 2018 09:55)  T(F): 98.1 (08 Feb 2018 12:35), Max: 98.2 (08 Feb 2018 09:55)  HR: 63 (08 Feb 2018 12:35) (63 - 73)  BP: 131/67 (08 Feb 2018 12:35) (127/64 - 143/79)  BP(mean): --  RR: 16 (08 Feb 2018 12:35) (16 - 20)  SpO2: 100% (08 Feb 2018 12:35) (96% - 100%)    MEDS  MEDICATIONS  (STANDING):  amiodarone    Tablet 400 milliGRAM(s) Oral two times a day  diVALproex  milliGRAM(s) Oral at bedtime  enoxaparin Injectable 40 milliGRAM(s) SubCutaneous every 24 hours  metoprolol    tartrate Injectable 5 milliGRAM(s) IV Push every 6 hours  risperiDONE   Tablet 1 milliGRAM(s) Oral at bedtime  risperiDONE   Tablet 1 milliGRAM(s) Oral <User Schedule>  traZODone 150 milliGRAM(s) Oral at bedtime    LABs                        9.9    6.35  )-----------( 339      ( 07 Feb 2018 02:55 )             31.2   02-07    140  |  102  |  15  ----------------------------<  90  3.5   |  25  |  0.76    Ca    7.5<L>      07 Feb 2018 02:55  Phos  2.6     02-07  Mg     1.8     02-07

## 2018-02-08 NOTE — DISCHARGE NOTE ADULT - INSTRUCTIONS
regular patient is hemodynamically stable, denies chest pain and SOB, Vitals are stable.  pt midline abdomen incision c/d/i.

## 2018-02-08 NOTE — DISCHARGE NOTE ADULT - CARE PLAN
Goal:	s/p ventral hernia with gangrene due to Cardona's hernia and foreign body s/p lap ileocecectomy  Assessment and plan of treatment:	WOUND CARE:  Please keep incisions clean and dry. Please do not Scrub or rub incisions. Do not use lotion or powder on incisions.   BATHING: You may shower and/or sponge bathe. You may use warm soapy water in the shower and rinse, pat dry.  ACTIVITY: No heavy lifting or straining. Otherwise, you may return to your usual level of physical activity. If you are taking narcotic pain medication DO NOT drive a car, operate machinery or make important decisions.  DIET: Return to your usual diet.  NOTIFY YOUR SURGEON IF: You have any bleeding that does not stop, any pus draining from your wound(s), increased pain at surgical site, any fever (over 100.4 F) persistent nausea/vomiting, or if your pain is not controlled on your discharge pain medications.  Please follow up with your primary care physician in 1-2 weeks regarding your hospitalization.  Please follow up with your surgeon, Dr. Beckford in 1 week. Please call [phone] to make an appointment Principal Discharge DX:	Incarcerated hernia  Goal:	s/p ventral hernia with gangrene due to Cardona's hernia and foreign body s/p lap ileocecectomy  Assessment and plan of treatment:	WOUND CARE:  Please keep incisions clean and dry. Please do not Scrub or rub incisions. Do not use lotion or powder on incisions.   BATHING: You may shower and/or sponge bathe. You may use warm soapy water in the shower and rinse, pat dry.  ACTIVITY: No heavy lifting or straining. Otherwise, you may return to your usual level of physical activity. If you are taking narcotic pain medication DO NOT drive a car, operate machinery or make important decisions.  DIET: Return to your usual diet.  NOTIFY YOUR SURGEON IF: You have any bleeding that does not stop, any pus draining from your wound(s), increased pain at surgical site, any fever (over 100.4 F) persistent nausea/vomiting, or if your pain is not controlled on your discharge pain medications.  Please follow up with your primary care physician in 1-2 weeks regarding your hospitalization.  Please follow up with your surgeon, Dr. Beckford in 1 week. Please call [phone] to make an appointment

## 2018-02-08 NOTE — PROGRESS NOTE ADULT - ASSESSMENT
A/P:   33yoM w/ PMHx , autism (non-verbal at baseline), chronic umbilical hernia, PAF (no AC at home) p/w perceived abd pain found to have ventral hernia with gangrene due to Cardona's hernia and foreign body.  Cardiology c./s initialy for management of pAF. Patient's [id number] score is 0, and in a young patient, would defer anticoagulation.      Plan  - after 1 g of IV amiodorone loading, can start PO Amiodorone 400 mg BID x 12 doses for a total loading dose of 5 grams.   - after 5 gram loading dose would start 200 mg Amiodorone PO daily   - please reconsult cardiology if any new questions     Bautista Clark MD  Cardiology Fellow  x [phone]

## 2018-02-08 NOTE — PROGRESS NOTE ADULT - ASSESSMENT
CHERYL MCCORMACK is a 33y Male POD#5 from laparotomy, ileocecectomy. Continues on amiodarone drip, but will transition fully to oral medication. Now having flatus and BM and tolerating diet     PLAN:  - Amiodarone gtt for atrial fibrillation; will transition to oral medication   - Zosyn  - Adv to regular diet  - Strict I's and O's  - f/u AM labs , replete as needed    A Team [id number]

## 2018-02-08 NOTE — DISCHARGE NOTE ADULT - HOSPITAL COURSE
33M PMHx autism (non-verbal at baseline), HTN, chronic umbilical hernia presented to [facility] ED the afternoon of 2/3/18 for perceived abd pain. Unknown how long hernia present, or whether congenital; never able to reduce. Recently presented to OSH 3 days prior to presentation ([facility] ED 1/31) complaining of 1 episode of vomiting, and sent home w/ dx of viral gastroenteritis. Since that episode patient has not vomited, but family have perceived that pt appears more uncomfortable in recent few days and has been hiccuping. His umbilical area also appeared to grow in size & the overlying skin became red. Recent URI sx including cough that have largely resolved. Denies fever. last BM the morning of presentation and in ED. Patient hospitalized ~2 years ago at [facility] (after eating a banana whole, including peel) for esophageal tear (no operative intervention) and was discharged on liquid then puree/mush diet for ~6months. Patient reportedly will eat foreign objects within reach.     In ED VS: T37.1, P107, /99, RR18, SpO2 95% on RA. Labs notable for 83% neutrophils, Na 131, Cl 90, BUN 24, Cr 1.4, venous lactate 1.6. CT showing incarcerated umbilical hernia containing bowel, possibly ischemic changes and foreign objects in hernia & R colon. Received 2L NS, vanc/zosyn    Pt was taken to OR on 2/3 for ventral hernia with gangrene due to Cardona's hernia and foreign body s/p lap ileocecectomy.   Post op in PACU  patient was hypotensive with Afib and SICU was consulted for closer monitoring.    2/4 pt was started on amio gtt for afib     2/7 pt stable on amio gtt for loading and was transferred to floor. Pt was having GI fuxn and NGT was discontinued     2/8 pt tolerated full diet. amiodarone gtt was transitioned to PO amiodarone load 33M PMHx autism (non-verbal at baseline), HTN, chronic umbilical hernia presented to [facility] ED the afternoon of 2/3/18 for perceived abd pain. Unknown how long hernia present, or whether congenital; never able to reduce. Recently presented to OSH 3 days prior to presentation ([facility] ED 1/31) complaining of 1 episode of vomiting, and sent home w/ dx of viral gastroenteritis. Since that episode patient has not vomited, but family have perceived that pt appears more uncomfortable in recent few days and has been hiccuping. His umbilical area also appeared to grow in size & the overlying skin became red. Recent URI sx including cough that have largely resolved. Denies fever. last BM the morning of presentation and in ED. Patient hospitalized ~2 years ago at [facility] (after eating a banana whole, including peel) for esophageal tear (no operative intervention) and was discharged on liquid then puree/mush diet for ~6months. Patient reportedly will eat foreign objects within reach.     In ED VS: T37.1, P107, /99, RR18, SpO2 95% on RA. Labs notable for 83% neutrophils, Na 131, Cl 90, BUN 24, Cr 1.4, venous lactate 1.6. CT showing incarcerated umbilical hernia containing bowel, possibly ischemic changes and foreign objects in hernia & R colon. Received 2L NS, vanc/zosyn    Pt was taken to OR on 2/3 for ventral hernia with gangrene due to Cardona's hernia and foreign body s/p lap ileocecectomy.   Post op in PACU  patient was hypotensive with Afib and SICU was consulted for closer monitoring.    2/4 pt was started on amio gtt for afib     2/7 pt stable on amio gtt for loading and was transferred to floor. Pt was having GI fuxn and NGT was discontinued     2/8 pt tolerated full diet. amiodarone gtt was transitioned to PO amiodarone load     2/10 pt had intermittent episodes of tachycardia while walking. Cardiology following. Recommended follow up with cardiologist as out patient.     At this time pt is stable for discharge home.

## 2018-02-08 NOTE — PROGRESS NOTE ADULT - SUBJECTIVE AND OBJECTIVE BOX
A TEAM GENERAL SURGERY DAILY PROGRESS NOTE:       Subjective: Patient examined at bedside. No issues overnight. Pain well controlled. + Flatus/ + BM. Voiding well. Tolerating diet.  Denied n/v, fever, chills, CP or SOB.        MEDICATIONS  (STANDING):  amiodarone Infusion 0.5 mG/Min (16.667 mL/Hr) IV Continuous <Continuous>  dextrose 5% + sodium chloride 0.9%. 1000 milliLiter(s) (125 mL/Hr) IV Continuous <Continuous>  diVALproex  milliGRAM(s) Oral at bedtime  enoxaparin Injectable 40 milliGRAM(s) SubCutaneous every 24 hours  metoprolol    tartrate Injectable 5 milliGRAM(s) IV Push every 6 hours  risperiDONE   Tablet 1 milliGRAM(s) Oral at bedtime  risperiDONE   Tablet 1 milliGRAM(s) Oral <User Schedule>  traZODone 150 milliGRAM(s) Oral at bedtime    MEDICATIONS  (PRN):  temazepam 15 milliGRAM(s) Oral at bedtime PRN Insomnia      Objective:  Vital Signs Last 24 Hrs  T(C): 36.5 (08 Feb 2018 05:49), Max: 37.1 (07 Feb 2018 16:00)  T(F): 97.7 (08 Feb 2018 05:49), Max: 98.7 (07 Feb 2018 16:00)  HR: 72 (08 Feb 2018 05:49) (68 - 85)  BP: 130/61 (08 Feb 2018 05:49) (116/69 - 146/80)  BP(mean): 92 (07 Feb 2018 16:00) (79 - 95)  RR: 20 (08 Feb 2018 05:49) (17 - 22)  SpO2: 96% (08 Feb 2018 05:49) (94% - 100%)    Gen: NAD, lying comfortably in bed  Resp: Breathing comfortably on RA  Cardiac: RRR, no murmurs , gallops or rubs  GI: soft, nontender, nondistended, incision clean, open portions of wound without signs of infection  Ext: All 4 extremities warm and well perfused, no edema    I&O's Detail    07 Feb 2018 07:01  -  08 Feb 2018 07:00  --------------------------------------------------------  IN:    amiodarone Infusion: 334 mL    dextrose 5% + sodium chloride 0.9%: 1200 mL    dextrose 5% + sodium chloride 0.9%.: 500 mL    IV PiggyBack: 250 mL  Total IN: 2284 mL    OUT:    Incontinent per Condom Catheter: 805 mL  Total OUT: 805 mL    Total NET: 1479 mL          Daily     Daily     LABS:                        9.9    6.35  )-----------( 339      ( 07 Feb 2018 02:55 )             31.2     02-07    140  |  102  |  15  ----------------------------<  90  3.5   |  25  |  0.76    Ca    7.5<L>      07 Feb 2018 02:55  Phos  2.6     02-07  Mg     1.8     02-07            RADIOLOGY & ADDITIONAL STUDIES:

## 2018-02-08 NOTE — DISCHARGE NOTE ADULT - CARE PROVIDER_API CALL
Minh Beckford), ColonRectal Surgery; Surgery  [address] [place] [address]  Phone: [phone]  Fax: [phone] Minh Beckford), ColonRectal Surgery; Surgery  [address] [place] [address]  Phone: [phone]  Fax: [phone]    Houston Diaz), Cardiac Electrophysiology; Cardiovascular Disease  [address] [place] [address] [address] [place] [address]  Phone: [phone]  Fax: [phone]

## 2018-02-09 LAB
BUN SERPL-MCNC: 9 MG/DL — SIGNIFICANT CHANGE UP (ref 7–23)
CALCIUM SERPL-MCNC: 7.8 MG/DL — LOW (ref 8.4–10.5)
CHLORIDE SERPL-SCNC: 102 MMOL/L — SIGNIFICANT CHANGE UP (ref 98–107)
CO2 SERPL-SCNC: 26 MMOL/L — SIGNIFICANT CHANGE UP (ref 22–31)
CREAT SERPL-MCNC: 0.69 MG/DL — SIGNIFICANT CHANGE UP (ref 0.5–1.3)
GLUCOSE SERPL-MCNC: 92 MG/DL — SIGNIFICANT CHANGE UP (ref 70–99)
HCT VFR BLD CALC: 36.2 % — LOW (ref 39–50)
HGB BLD-MCNC: 11.6 G/DL — LOW (ref 13–17)
MAGNESIUM SERPL-MCNC: 1.7 MG/DL — SIGNIFICANT CHANGE UP (ref 1.6–2.6)
MCHC RBC-ENTMCNC: 28 PG — SIGNIFICANT CHANGE UP (ref 27–34)
MCHC RBC-ENTMCNC: 32 % — SIGNIFICANT CHANGE UP (ref 32–36)
MCV RBC AUTO: 87.4 FL — SIGNIFICANT CHANGE UP (ref 80–100)
NRBC # FLD: 0 — SIGNIFICANT CHANGE UP
PHOSPHATE SERPL-MCNC: 3.4 MG/DL — SIGNIFICANT CHANGE UP (ref 2.5–4.5)
PLATELET # BLD AUTO: 428 K/UL — HIGH (ref 150–400)
PMV BLD: 9.3 FL — SIGNIFICANT CHANGE UP (ref 7–13)
POTASSIUM SERPL-MCNC: 4 MMOL/L — SIGNIFICANT CHANGE UP (ref 3.5–5.3)
POTASSIUM SERPL-SCNC: 4 MMOL/L — SIGNIFICANT CHANGE UP (ref 3.5–5.3)
RBC # BLD: 4.14 M/UL — LOW (ref 4.2–5.8)
RBC # FLD: 13.9 % — SIGNIFICANT CHANGE UP (ref 10.3–14.5)
SODIUM SERPL-SCNC: 138 MMOL/L — SIGNIFICANT CHANGE UP (ref 135–145)
WBC # BLD: 5.15 K/UL — SIGNIFICANT CHANGE UP (ref 3.8–10.5)
WBC # FLD AUTO: 5.15 K/UL — SIGNIFICANT CHANGE UP (ref 3.8–10.5)

## 2018-02-09 RX ORDER — METOPROLOL TARTRATE 50 MG
100 TABLET ORAL DAILY
Qty: 0 | Refills: 0 | Status: DISCONTINUED | OUTPATIENT
Start: 2018-02-09 | End: 2018-02-09

## 2018-02-09 RX ORDER — METOPROLOL TARTRATE 50 MG
100 TABLET ORAL DAILY
Qty: 0 | Refills: 0 | Status: DISCONTINUED | OUTPATIENT
Start: 2018-02-09 | End: 2018-02-12

## 2018-02-09 RX ORDER — MAGNESIUM SULFATE 500 MG/ML
2 VIAL (ML) INJECTION ONCE
Qty: 0 | Refills: 0 | Status: COMPLETED | OUTPATIENT
Start: 2018-02-09 | End: 2018-02-09

## 2018-02-09 RX ADMIN — AMIODARONE HYDROCHLORIDE 400 MILLIGRAM(S): 400 TABLET ORAL at 17:21

## 2018-02-09 RX ADMIN — ENOXAPARIN SODIUM 40 MILLIGRAM(S): 100 INJECTION SUBCUTANEOUS at 05:34

## 2018-02-09 RX ADMIN — Medication 150 MILLIGRAM(S): at 21:45

## 2018-02-09 RX ADMIN — AMIODARONE HYDROCHLORIDE 400 MILLIGRAM(S): 400 TABLET ORAL at 05:35

## 2018-02-09 RX ADMIN — DIVALPROEX SODIUM 750 MILLIGRAM(S): 500 TABLET, DELAYED RELEASE ORAL at 21:45

## 2018-02-09 RX ADMIN — Medication 100 MILLIGRAM(S): at 10:05

## 2018-02-09 RX ADMIN — RISPERIDONE 1 MILLIGRAM(S): 4 TABLET ORAL at 21:45

## 2018-02-09 RX ADMIN — Medication 50 GRAM(S): at 10:05

## 2018-02-09 RX ADMIN — Medication 5 MILLIGRAM(S): at 05:34

## 2018-02-09 RX ADMIN — RISPERIDONE 1 MILLIGRAM(S): 4 TABLET ORAL at 05:33

## 2018-02-09 NOTE — PROVIDER CONTACT NOTE (OTHER) - RECOMMENDATIONS
team at bedside to assess. no interventions given at this time. will monitor
Will continue to monitor.

## 2018-02-09 NOTE — PROGRESS NOTE ADULT - ASSESSMENT
CHERYL MCCORMACK is a 33y Male POD#6 from laparotomy, ileocecectomy. Transitioned to PO amiodarone yesterday. having flatus and BM and tolerating diet     PLAN:  - c/w regular diet  - Amiodarone 400 mg BID, d/c on amiodarone 200 mg QD  - Strict I's and O's  - f/u AM labs , replete as needed  - f/u PT reccs  - Cardiology reccs appreciated  - Dispo: Plan for d/c home once amiodarone loading dose complete    A Team [id number]

## 2018-02-09 NOTE — PROGRESS NOTE ADULT - SUBJECTIVE AND OBJECTIVE BOX
A TEAM GENERAL SURGERY DAILY PROGRESS NOTE:       Subjective: Patient examined at bedside. No issues overnight. Pain well controlled. + Flatus/ + BM. Voiding and tolerating diet without issue Denied n/v, fever, chills, CP or SOB.        MEDICATIONS  (STANDING):  amiodarone    Tablet 400 milliGRAM(s) Oral two times a day  diVALproex  milliGRAM(s) Oral at bedtime  enoxaparin Injectable 40 milliGRAM(s) SubCutaneous every 24 hours  metoprolol succinate  milliGRAM(s) Oral daily  risperiDONE   Tablet 1 milliGRAM(s) Oral at bedtime  risperiDONE   Tablet 1 milliGRAM(s) Oral <User Schedule>  traZODone 150 milliGRAM(s) Oral at bedtime    MEDICATIONS  (PRN):  temazepam 15 milliGRAM(s) Oral at bedtime PRN Insomnia      Objective:  Vital Signs Last 24 Hrs  T(C): 36.7 (2018 11:45), Max: 36.8 (2018 00:47)  T(F): 98.1 (2018 11:45), Max: 98.2 (2018 00:47)  HR: 84 (2018 11:45) (70 - 88)  BP: 126/53 (2018 11:45) (113/75 - 136/70)  BP(mean): --  RR: 16 (2018 11:45) (16 - 16)  SpO2: 96% (2018 11:45) (94% - 97%)    Gen: NAD, lying comfortably in bed  Resp: Breathing comfortably on RA  Cardiac: RRR, no murmurs , gallops or rubs  GI: soft, nontender, nondistended. Incision CDI, packing changed.   Ext: All 4 extremities warm and well perfused, no edema    I&O's Detail    2018 07:01  -  2018 07:00  --------------------------------------------------------  IN:    amiodarone Infusion: 66.8 mL    dextrose 5% + sodium chloride 0.9%: 725 mL    Oral Fluid: 600 mL  Total IN: 1391.8 mL    OUT:    Incontinent per Condom Catheter: 1000 mL  Total OUT: 1000 mL    Total NET: 391.8 mL      2018 07:01  -  2018 13:17  --------------------------------------------------------  IN:  Total IN: 0 mL    OUT:    Incontinent per Condom Catheter: 300 mL  Total OUT: 300 mL    Total NET: -300 mL          Daily     Daily Weight in k.6 (2018 05:30)    LABS:                        11.6   5.15  )-----------( 428      ( 2018 06:49 )             36.2         138  |  102  |  9   ----------------------------<  92  4.0   |  26  |  0.69    Ca    7.8<L>      2018 06:49  Phos  3.4       Mg     1.7                 RADIOLOGY & ADDITIONAL STUDIES:

## 2018-02-09 NOTE — PHYSICAL THERAPY INITIAL EVALUATION ADULT - PERTINENT HX OF CURRENT PROBLEM, REHAB EVAL
pt is 34 y/o male admitted with history of autism, presents with abdominal pain, ventral hernia with gangrene

## 2018-02-09 NOTE — PROVIDER CONTACT NOTE (OTHER) - BACKGROUND
When patient ambulates patient goes tachy and has bigem PVCS Throughout shift When patient ambulates patient goes tachy and has bigem PVCS and had burst of a fib. At rest patient goes back into normal sinus rhythm.

## 2018-02-10 LAB
BACTERIA BLD CULT: SIGNIFICANT CHANGE UP
BACTERIA BLD CULT: SIGNIFICANT CHANGE UP
BUN SERPL-MCNC: 8 MG/DL — SIGNIFICANT CHANGE UP (ref 7–23)
CALCIUM SERPL-MCNC: 7.8 MG/DL — LOW (ref 8.4–10.5)
CHLORIDE SERPL-SCNC: 101 MMOL/L — SIGNIFICANT CHANGE UP (ref 98–107)
CO2 SERPL-SCNC: 30 MMOL/L — SIGNIFICANT CHANGE UP (ref 22–31)
CREAT SERPL-MCNC: 0.71 MG/DL — SIGNIFICANT CHANGE UP (ref 0.5–1.3)
GLUCOSE SERPL-MCNC: 91 MG/DL — SIGNIFICANT CHANGE UP (ref 70–99)
HCT VFR BLD CALC: 28.6 % — LOW (ref 39–50)
HGB BLD-MCNC: 9.5 G/DL — LOW (ref 13–17)
MAGNESIUM SERPL-MCNC: 1.7 MG/DL — SIGNIFICANT CHANGE UP (ref 1.6–2.6)
MCHC RBC-ENTMCNC: 28.4 PG — SIGNIFICANT CHANGE UP (ref 27–34)
MCHC RBC-ENTMCNC: 33.2 % — SIGNIFICANT CHANGE UP (ref 32–36)
MCV RBC AUTO: 85.4 FL — SIGNIFICANT CHANGE UP (ref 80–100)
NRBC # FLD: 0 — SIGNIFICANT CHANGE UP
PHOSPHATE SERPL-MCNC: 3.2 MG/DL — SIGNIFICANT CHANGE UP (ref 2.5–4.5)
PLATELET # BLD AUTO: 401 K/UL — HIGH (ref 150–400)
PMV BLD: 9 FL — SIGNIFICANT CHANGE UP (ref 7–13)
POTASSIUM SERPL-MCNC: 3.7 MMOL/L — SIGNIFICANT CHANGE UP (ref 3.5–5.3)
POTASSIUM SERPL-SCNC: 3.7 MMOL/L — SIGNIFICANT CHANGE UP (ref 3.5–5.3)
RBC # BLD: 3.35 M/UL — LOW (ref 4.2–5.8)
RBC # FLD: 13.9 % — SIGNIFICANT CHANGE UP (ref 10.3–14.5)
SODIUM SERPL-SCNC: 140 MMOL/L — SIGNIFICANT CHANGE UP (ref 135–145)
WBC # BLD: 9.5 K/UL — SIGNIFICANT CHANGE UP (ref 3.8–10.5)
WBC # FLD AUTO: 9.5 K/UL — SIGNIFICANT CHANGE UP (ref 3.8–10.5)

## 2018-02-10 RX ORDER — MAGNESIUM SULFATE 500 MG/ML
2 VIAL (ML) INJECTION ONCE
Qty: 0 | Refills: 0 | Status: COMPLETED | OUTPATIENT
Start: 2018-02-10 | End: 2018-02-10

## 2018-02-10 RX ORDER — POTASSIUM CHLORIDE 20 MEQ
40 PACKET (EA) ORAL ONCE
Qty: 0 | Refills: 0 | Status: COMPLETED | OUTPATIENT
Start: 2018-02-10 | End: 2018-02-10

## 2018-02-10 RX ADMIN — AMIODARONE HYDROCHLORIDE 400 MILLIGRAM(S): 400 TABLET ORAL at 05:14

## 2018-02-10 RX ADMIN — RISPERIDONE 1 MILLIGRAM(S): 4 TABLET ORAL at 05:12

## 2018-02-10 RX ADMIN — Medication 100 MILLIGRAM(S): at 05:14

## 2018-02-10 RX ADMIN — AMIODARONE HYDROCHLORIDE 400 MILLIGRAM(S): 400 TABLET ORAL at 17:21

## 2018-02-10 RX ADMIN — DIVALPROEX SODIUM 750 MILLIGRAM(S): 500 TABLET, DELAYED RELEASE ORAL at 22:16

## 2018-02-10 RX ADMIN — ENOXAPARIN SODIUM 40 MILLIGRAM(S): 100 INJECTION SUBCUTANEOUS at 05:14

## 2018-02-10 RX ADMIN — Medication 50 GRAM(S): at 12:38

## 2018-02-10 RX ADMIN — RISPERIDONE 1 MILLIGRAM(S): 4 TABLET ORAL at 22:16

## 2018-02-10 RX ADMIN — Medication 150 MILLIGRAM(S): at 22:16

## 2018-02-10 RX ADMIN — Medication 40 MILLIEQUIVALENT(S): at 12:40

## 2018-02-10 NOTE — PROGRESS NOTE ADULT - SUBJECTIVE AND OBJECTIVE BOX
Doing well. tolerating diet. To address [facility]ilogic issues [facility]

## 2018-02-10 NOTE — PROGRESS NOTE ADULT - SUBJECTIVE AND OBJECTIVE BOX
A Team Surgery Daily Progress Note    SUBJECTIVE:  Doing well.   Yesterday, the patient had several short, 2-4 beat, runs of ventricular tachycardia while ambulating. Asymptomatic during these episodes. Resolve spontaneously with rest.     OBJECTIVE:     ** VITAL SIGNS / I&O's **    T(C): 36.6 (02-10-18 @ 05:10), Max: 37.1 (02-09-18 @ 16:05)  T(F): 97.9 (02-10-18 @ 05:10), Max: 98.8 (02-09-18 @ 16:05)  HR: 60 (02-10-18 @ 05:10) (60 - 83)  BP: 111/57 (02-10-18 @ 05:10) (111/57 - 131/72)  RR: 18 (02-10-18 @ 05:10) (18 - 18)  SpO2: 96% (02-10-18 @ 05:10) (96% - 98%)      09 Feb 2018 07:01  -  10 Feb 2018 07:00  --------------------------------------------------------  IN:  Total IN: 0 mL    OUT:    Incontinent per Condom Catheter: 300 mL    Voided: 200 mL  Total OUT: 500 mL    Total NET: -500 mL          ** PHYSICAL EXAM **    -- CONSTITUTIONAL: awake, alert, interactive, at neurologic baseline   -- CARDIOVASCULAR: normotensive, regular rate   -- RESPIRATORY: breathing comfortably   -- ABDOMEN: soft, nontender, nondistended, open portions of wound without sign of infection, packing replaced        ** LABS **                 9.5    9.50   )----------(  401       ( 10 Feb 2018 06:00 )               28.6      140    |  101    |  8      ----------------------------<  91         ( 10 Feb 2018 06:00 )  3.7     |  30     |  0.71     Ca    7.8        ( 10 Feb 2018 06:00 )  Phos  3.2       ( 10 Feb 2018 06:00 )  Mg     1.7       ( 10 Feb 2018 06:00 )          CAPILLARY BLOOD GLUCOSE

## 2018-02-10 NOTE — PROGRESS NOTE ADULT - ASSESSMENT
CHERYL MCCORMACK is a 33y Male POD#7 from laparotomy, ileocecectomy. Transitioned to PO amiodarone and continues with loading. Several episodes of non-sustained Vtach with spontaneous resolution on 2/9. Discussed with cardiology and agreed to continue monitoring and with current therapeutic plan.     PLAN:  - c/w regular diet  - Amiodarone 400 mg BID, d/c on amiodarone 200 mg QD  - Strict I's and O's  - f/u AM labs , replete as needed  - f/u PT reccs  - Cardiology reccs appreciated; will also contact the patient's private cardiologist Dr. Houston Diaz ([phone])  - Dispo: Plan for d/c home once amiodarone loading dose complete    A Team [id number]

## 2018-02-11 LAB
BUN SERPL-MCNC: 8 MG/DL — SIGNIFICANT CHANGE UP (ref 7–23)
CALCIUM SERPL-MCNC: 8 MG/DL — LOW (ref 8.4–10.5)
CHLORIDE SERPL-SCNC: 101 MMOL/L — SIGNIFICANT CHANGE UP (ref 98–107)
CO2 SERPL-SCNC: 30 MMOL/L — SIGNIFICANT CHANGE UP (ref 22–31)
CREAT SERPL-MCNC: 0.74 MG/DL — SIGNIFICANT CHANGE UP (ref 0.5–1.3)
GLUCOSE BLDC GLUCOMTR-MCNC: 101 MG/DL — HIGH (ref 70–99)
GLUCOSE SERPL-MCNC: 86 MG/DL — SIGNIFICANT CHANGE UP (ref 70–99)
HCT VFR BLD CALC: 32.7 % — LOW (ref 39–50)
HGB BLD-MCNC: 10.7 G/DL — LOW (ref 13–17)
MAGNESIUM SERPL-MCNC: 1.9 MG/DL — SIGNIFICANT CHANGE UP (ref 1.6–2.6)
MCHC RBC-ENTMCNC: 27.8 PG — SIGNIFICANT CHANGE UP (ref 27–34)
MCHC RBC-ENTMCNC: 32.7 % — SIGNIFICANT CHANGE UP (ref 32–36)
MCV RBC AUTO: 84.9 FL — SIGNIFICANT CHANGE UP (ref 80–100)
NRBC # FLD: 0 — SIGNIFICANT CHANGE UP
PHOSPHATE SERPL-MCNC: 3.7 MG/DL — SIGNIFICANT CHANGE UP (ref 2.5–4.5)
PLATELET # BLD AUTO: 482 K/UL — HIGH (ref 150–400)
PMV BLD: 9.2 FL — SIGNIFICANT CHANGE UP (ref 7–13)
POTASSIUM SERPL-MCNC: 4.3 MMOL/L — SIGNIFICANT CHANGE UP (ref 3.5–5.3)
POTASSIUM SERPL-SCNC: 4.3 MMOL/L — SIGNIFICANT CHANGE UP (ref 3.5–5.3)
RBC # BLD: 3.85 M/UL — LOW (ref 4.2–5.8)
RBC # FLD: 13.9 % — SIGNIFICANT CHANGE UP (ref 10.3–14.5)
SODIUM SERPL-SCNC: 139 MMOL/L — SIGNIFICANT CHANGE UP (ref 135–145)
WBC # BLD: 10.9 K/UL — HIGH (ref 3.8–10.5)
WBC # FLD AUTO: 10.9 K/UL — HIGH (ref 3.8–10.5)

## 2018-02-11 RX ADMIN — AMIODARONE HYDROCHLORIDE 400 MILLIGRAM(S): 400 TABLET ORAL at 05:14

## 2018-02-11 RX ADMIN — RISPERIDONE 1 MILLIGRAM(S): 4 TABLET ORAL at 22:27

## 2018-02-11 RX ADMIN — ENOXAPARIN SODIUM 40 MILLIGRAM(S): 100 INJECTION SUBCUTANEOUS at 05:14

## 2018-02-11 RX ADMIN — Medication 100 MILLIGRAM(S): at 05:14

## 2018-02-11 RX ADMIN — AMIODARONE HYDROCHLORIDE 400 MILLIGRAM(S): 400 TABLET ORAL at 18:04

## 2018-02-11 RX ADMIN — DIVALPROEX SODIUM 750 MILLIGRAM(S): 500 TABLET, DELAYED RELEASE ORAL at 22:28

## 2018-02-11 RX ADMIN — RISPERIDONE 1 MILLIGRAM(S): 4 TABLET ORAL at 05:16

## 2018-02-11 RX ADMIN — Medication 150 MILLIGRAM(S): at 22:27

## 2018-02-11 NOTE — PROGRESS NOTE ADULT - SUBJECTIVE AND OBJECTIVE BOX
A Team Surgery Daily Progress Note    SUBJECTIVE:  Doing well.   No overnight events.   Several episodes of atrial fibrillation yesterday during ambulation. No further ventricular tachycardia.     OBJECTIVE:     ** VITAL SIGNS / I&O's **    T(C): 36.8 (02-11-18 @ 04:26), Max: 36.8 (02-11-18 @ 04:26)  T(F): 98.3 (02-11-18 @ 04:26), Max: 98.3 (02-11-18 @ 04:26)  HR: 98 (02-11-18 @ 04:26) (64 - 99)  BP: 116/70 (02-11-18 @ 04:26) (107/58 - 132/73)  RR: 18 (02-11-18 @ 04:26) (18 - 18)  SpO2: 96% (02-11-18 @ 04:26) (95% - 100%)      10 Feb 2018 07:01  -  11 Feb 2018 07:00  --------------------------------------------------------  IN:    Oral Fluid: 400 mL  Total IN: 400 mL    OUT:    Voided: 600 mL  Total OUT: 600 mL    Total NET: -200 mL          ** PHYSICAL EXAM **    -- CONSTITUTIONAL: awake, alert, interactive during exam, at neurologic baseline   -- CARDIOVASCULAR: normotensive, regular rate   -- RESPIRATORY: breathing comfortably   -- ABDOMEN: soft, nontender, nondistended, open portion of the wound clean without sign of infection     ** LABS **    CAPILLARY BLOOD GLUCOSE

## 2018-02-11 NOTE — PROGRESS NOTE ADULT - ASSESSMENT
CHERYL MCCORMACK is a 33y Male POD#8 from laparotomy, ileocecectomy. Transitioned to PO amiodarone and continues with loading. Several episodes of non-sustained Vtach with spontaneous resolution on 2/9. Discussed with cardiology and agreed to continue monitoring and with current therapeutic plan. Some atrial fibrillation on 2/10, but asymptomatic. Back to sinus.     PLAN:  - c/w regular diet  - Amiodarone 400 mg BID, d/c on amiodarone 200 mg QD  - Strict I's and O's  - f/u AM labs , replete as needed  - f/u PT reccs  - Cardiology reccs appreciated; will also contact the patient's private cardiologist Dr. Houston Diaz ([phone])  - Dispo: Plan for d/c home once amiodarone loading dose complete    A Team [id number]

## 2018-02-12 VITALS
OXYGEN SATURATION: 98 % | RESPIRATION RATE: 18 BRPM | SYSTOLIC BLOOD PRESSURE: 120 MMHG | DIASTOLIC BLOOD PRESSURE: 68 MMHG | HEART RATE: 98 BPM

## 2018-02-12 LAB
BUN SERPL-MCNC: 8 MG/DL — SIGNIFICANT CHANGE UP (ref 7–23)
CALCIUM SERPL-MCNC: 8.3 MG/DL — LOW (ref 8.4–10.5)
CHLORIDE SERPL-SCNC: 100 MMOL/L — SIGNIFICANT CHANGE UP (ref 98–107)
CO2 SERPL-SCNC: 27 MMOL/L — SIGNIFICANT CHANGE UP (ref 22–31)
CREAT SERPL-MCNC: 0.77 MG/DL — SIGNIFICANT CHANGE UP (ref 0.5–1.3)
GLUCOSE SERPL-MCNC: 73 MG/DL — SIGNIFICANT CHANGE UP (ref 70–99)
HCT VFR BLD CALC: 35.8 % — LOW (ref 39–50)
HGB BLD-MCNC: 11.2 G/DL — LOW (ref 13–17)
MAGNESIUM SERPL-MCNC: 1.7 MG/DL — SIGNIFICANT CHANGE UP (ref 1.6–2.6)
MCHC RBC-ENTMCNC: 26.9 PG — LOW (ref 27–34)
MCHC RBC-ENTMCNC: 31.3 % — LOW (ref 32–36)
MCV RBC AUTO: 86.1 FL — SIGNIFICANT CHANGE UP (ref 80–100)
NRBC # FLD: 0 — SIGNIFICANT CHANGE UP
PHOSPHATE SERPL-MCNC: 3.8 MG/DL — SIGNIFICANT CHANGE UP (ref 2.5–4.5)
PLATELET # BLD AUTO: 469 K/UL — HIGH (ref 150–400)
PMV BLD: 8.7 FL — SIGNIFICANT CHANGE UP (ref 7–13)
POTASSIUM SERPL-MCNC: 4.5 MMOL/L — SIGNIFICANT CHANGE UP (ref 3.5–5.3)
POTASSIUM SERPL-SCNC: 4.5 MMOL/L — SIGNIFICANT CHANGE UP (ref 3.5–5.3)
RBC # BLD: 4.16 M/UL — LOW (ref 4.2–5.8)
RBC # FLD: 14.2 % — SIGNIFICANT CHANGE UP (ref 10.3–14.5)
SODIUM SERPL-SCNC: 139 MMOL/L — SIGNIFICANT CHANGE UP (ref 135–145)
WBC # BLD: 11.7 K/UL — HIGH (ref 3.8–10.5)
WBC # FLD AUTO: 11.7 K/UL — HIGH (ref 3.8–10.5)

## 2018-02-12 RX ORDER — AMIODARONE HYDROCHLORIDE 400 MG/1
1 TABLET ORAL
Qty: 30 | Refills: 0 | OUTPATIENT
Start: 2018-02-12

## 2018-02-12 RX ORDER — AMIODARONE HYDROCHLORIDE 400 MG/1
200 TABLET ORAL DAILY
Qty: 0 | Refills: 0 | Status: DISCONTINUED | OUTPATIENT
Start: 2018-02-12 | End: 2018-02-12

## 2018-02-12 RX ADMIN — RISPERIDONE 1 MILLIGRAM(S): 4 TABLET ORAL at 06:08

## 2018-02-12 RX ADMIN — Medication 100 MILLIGRAM(S): at 06:09

## 2018-02-12 RX ADMIN — ENOXAPARIN SODIUM 40 MILLIGRAM(S): 100 INJECTION SUBCUTANEOUS at 06:09

## 2018-02-12 RX ADMIN — AMIODARONE HYDROCHLORIDE 200 MILLIGRAM(S): 400 TABLET ORAL at 14:51

## 2018-02-12 NOTE — PROVIDER CONTACT NOTE (OTHER) - ACTION/TREATMENT ORDERED:
No action/treatment ordered, will continue to monitor.
arm elevated on pillow. will give arm rest and place a new peripheral IV in R arm.
No new orders noted.

## 2018-02-12 NOTE — PROGRESS NOTE ADULT - ASSESSMENT
CHERYL MCCORMACK is a 33y Male POD#9 from laparotomy, ileocecectomy. Transitioned to PO amiodarone and continues with loading. Several episodes of non-sustained Vtach with spontaneous resolution on 2/9. Discussed with cardiology and agreed to continue monitoring and with current therapeutic plan. Some aflutter overnight that has since resolved.    PLAN:  - c/w regular diet  - amiodarone 200 mg QD, loading dose complete  - Cardiology reccs appreciated; will also contact the patient's private cardiologist Dr. Houston Diaz ([phone])  - Dispo: Plan for d/c home today    A Team [id number]

## 2018-02-12 NOTE — PROVIDER CONTACT NOTE (OTHER) - SITUATION
LUE swelling and firmness noted during AM assessment. IV fluids transferred to R peripheral IV placed by night PJ Do @2100
Pt. post perforated bowel/ Hx. of A Flutter and Afib.

## 2018-02-12 NOTE — PROGRESS NOTE ADULT - SUBJECTIVE AND OBJECTIVE BOX
A Team Surgery Daily Progress Note    SUBJECTIVE:  Patient seen and examined.  Had an episode of aflutter overnight that converted to NSR spontaneously.  Doing well this morning.     OBJECTIVE:     ** VITAL SIGNS / I&O's **  T(C): 36.4 (02-12-18 @ 08:00), Max: 36.6 (02-11-18 @ 16:00)  HR: 68 (02-12-18 @ 08:00) (66 - 95)  BP: 116/68 (02-12-18 @ 08:00) (106/55 - 121/58)  RR: 18 (02-12-18 @ 08:00) (18 - 18)  SpO2: 96% (02-12-18 @ 08:00) (95% - 100%)  Wt(kg): --    02-11 @ 07:01  -  02-12 @ 07:00  --------------------------------------------------------  IN:    Oral Fluid: 790 mL  Total IN: 790 mL    OUT:    Voided: 1150 mL  Total OUT: 1150 mL    Total NET: -360 mL        ** PHYSICAL EXAM **  -- CONSTITUTIONAL: awake, alert, interactive during exam, at neurologic baseline   -- CARDIOVASCULAR: normotensive, regular rate   -- RESPIRATORY: breathing comfortably   -- ABDOMEN: soft, nontender, nondistended, open portion of the wound clean without sign of infection     ** LABS **                          11.2   11.70 )-----------( 469      ( 12 Feb 2018 09:11 )             35.8     02-12    139  |  100  |  8   ----------------------------<  73  4.5   |  27  |  0.77    Ca    8.3<L>      12 Feb 2018 09:11  Phos  3.8     02-12  Mg     1.7     02-12

## 2018-02-13 PROBLEM — F84.0 AUTISTIC DISORDER: Chronic | Status: ACTIVE | Noted: 2018-02-03

## 2018-02-13 PROBLEM — I10 ESSENTIAL (PRIMARY) HYPERTENSION: Chronic | Status: ACTIVE | Noted: 2018-02-03

## 2018-02-23 ENCOUNTER — APPOINTMENT (OUTPATIENT)
Dept: SURGERY | Facility: CLINIC | Age: 33
End: 2018-02-23
Payer: MEDICARE

## 2018-02-23 VITALS
TEMPERATURE: 97 F | BODY MASS INDEX: 26.36 KG/M2 | WEIGHT: 178 LBS | HEIGHT: 69 IN | SYSTOLIC BLOOD PRESSURE: 155 MMHG | RESPIRATION RATE: 92 BRPM | DIASTOLIC BLOOD PRESSURE: 96 MMHG

## 2018-02-23 DIAGNOSIS — K46.9 UNSPECIFIED ABDOMINAL HERNIA W/OUT OBSTRUCTION OR GANGRENE: ICD-10-CM

## 2018-02-23 DIAGNOSIS — Z82.49 FAMILY HISTORY OF ISCHEMIC HEART DISEASE AND OTHER DISEASES OF THE CIRCULATORY SYSTEM: ICD-10-CM

## 2018-02-23 DIAGNOSIS — Z78.9 OTHER SPECIFIED HEALTH STATUS: ICD-10-CM

## 2018-02-23 DIAGNOSIS — Z86.59 PERSONAL HISTORY OF OTHER MENTAL AND BEHAVIORAL DISORDERS: ICD-10-CM

## 2018-02-23 PROCEDURE — 99024 POSTOP FOLLOW-UP VISIT: CPT

## 2018-02-23 RX ORDER — DRONEDARONE 400 MG/1
400 TABLET, FILM COATED ORAL
Refills: 0 | Status: ACTIVE | COMMUNITY

## 2018-02-23 RX ORDER — METOPROLOL SUCCINATE 100 MG/1
100 TABLET, EXTENDED RELEASE ORAL
Refills: 0 | Status: ACTIVE | COMMUNITY

## 2018-02-23 RX ORDER — RISPERIDONE 1 MG/1
1 TABLET, FILM COATED ORAL
Refills: 0 | Status: ACTIVE | COMMUNITY

## 2018-02-23 RX ORDER — DIVALPROEX SODIUM 500 MG/1
500 TABLET, DELAYED RELEASE ORAL
Refills: 0 | Status: ACTIVE | COMMUNITY

## 2018-02-23 RX ORDER — TEMAZEPAM 15 MG/1
15 CAPSULE ORAL
Refills: 0 | Status: ACTIVE | COMMUNITY

## 2018-02-23 RX ORDER — TRAZODONE HYDROCHLORIDE 150 MG/1
150 TABLET ORAL
Refills: 0 | Status: ACTIVE | COMMUNITY

## 2018-03-14 ENCOUNTER — APPOINTMENT (OUTPATIENT)
Dept: SURGERY | Facility: CLINIC | Age: 33
End: 2018-03-14
Payer: MEDICARE

## 2018-03-14 VITALS
WEIGHT: 178 LBS | HEIGHT: 69 IN | BODY MASS INDEX: 26.36 KG/M2 | SYSTOLIC BLOOD PRESSURE: 132 MMHG | DIASTOLIC BLOOD PRESSURE: 92 MMHG | HEART RATE: 98 BPM

## 2018-03-14 PROCEDURE — 99024 POSTOP FOLLOW-UP VISIT: CPT

## 2018-04-24 ENCOUNTER — EMERGENCY (EMERGENCY)
Facility: HOSPITAL | Age: 33
LOS: 1 days | Discharge: HOME | End: 2018-04-24
Attending: EMERGENCY MEDICINE | Admitting: EMERGENCY MEDICINE
Payer: MEDICARE

## 2018-04-24 VITALS
DIASTOLIC BLOOD PRESSURE: 99 MMHG | RESPIRATION RATE: 18 BRPM | TEMPERATURE: 98 F | OXYGEN SATURATION: 99 % | SYSTOLIC BLOOD PRESSURE: 144 MMHG | HEART RATE: 87 BPM

## 2018-04-24 PROCEDURE — 99283 EMERGENCY DEPT VISIT LOW MDM: CPT | Mod: 25

## 2018-04-24 NOTE — ED ADULT TRIAGE NOTE - CHIEF COMPLAINT QUOTE
pt brought in by sister, pmh mr and intellectual disability, per sister pt has three episodes of hematuria with urinary frequency today, pt not on antocoagulants, recently had hernia repair. per sister pt is at baseline mental status, and denies any fevers. pt hyperactive in triage, charge rn aware.

## 2018-04-25 DIAGNOSIS — K46.0 UNSPECIFIED ABDOMINAL HERNIA WITH OBSTRUCTION, WITHOUT GANGRENE: ICD-10-CM

## 2018-04-25 LAB
APPEARANCE UR: CLEAR — SIGNIFICANT CHANGE UP
BACTERIA # UR AUTO: SIGNIFICANT CHANGE UP
BILIRUB UR-MCNC: NEGATIVE — SIGNIFICANT CHANGE UP
BLOOD UR QL VISUAL: HIGH
COLOR SPEC: YELLOW — SIGNIFICANT CHANGE UP
GLUCOSE UR-MCNC: NEGATIVE — SIGNIFICANT CHANGE UP
KETONES UR-MCNC: NEGATIVE — SIGNIFICANT CHANGE UP
LEUKOCYTE ESTERASE UR-ACNC: HIGH
NITRITE UR-MCNC: NEGATIVE — SIGNIFICANT CHANGE UP
PH UR: 7.5 — SIGNIFICANT CHANGE UP (ref 4.6–8)
PROT UR-MCNC: 30 MG/DL — HIGH
RBC CASTS # UR COMP ASSIST: >50 — HIGH (ref 0–?)
SP GR SPEC: 1.01 — SIGNIFICANT CHANGE UP (ref 1–1.04)
UROBILINOGEN FLD QL: NORMAL MG/DL — SIGNIFICANT CHANGE UP
WBC UR QL: SIGNIFICANT CHANGE UP (ref 0–?)

## 2018-04-25 RX ORDER — CEFUROXIME AXETIL 250 MG
250 TABLET ORAL ONCE
Qty: 0 | Refills: 0 | Status: COMPLETED | OUTPATIENT
Start: 2018-04-25 | End: 2018-04-25

## 2018-04-25 RX ORDER — CEFUROXIME AXETIL 250 MG
1 TABLET ORAL
Qty: 14 | Refills: 0 | OUTPATIENT
Start: 2018-04-25 | End: 2018-05-01

## 2018-04-25 RX ADMIN — Medication 250 MILLIGRAM(S): at 02:21

## 2018-04-25 NOTE — ED PROVIDER NOTE - OBJECTIVE STATEMENT
34y/o M with PMHx of Autism, HTN, presents to the ED with 2 instances of hematuria today. Per sister: Pt went to the bathroom this morning and blood was seen in his diaper. He urinated shortly afterwards, and blood was seen again during that instance. Pt has been urinating more frequently than usual. He has not had past kidney problems or UTIs. Denies fever, vomiting, diarrhea, rash, any other complaints. NKDA.

## 2018-04-25 NOTE — ED ADULT NURSE NOTE - OBJECTIVE STATEMENT
Covering RN- Pt received to rm 19 baseline mental status and ambulatory c/o hematuria. Pt pmh MR and intellectual disability. As per sister pt has three episodes of hematuria with urinary frequency today, pt not on anticoagulants, recently had hernia repair. per sister pt is at baseline mental status, and denies any fevers. Pt calm and cooperative in ED.

## 2018-04-25 NOTE — ED PROVIDER NOTE - MEDICAL DECISION MAKING DETAILS
32y/o M with hematuria today. Will perform UA, urine culture, reassess. 34y/o M with hematuria today. Will perform UA and urine culture.  Need to consider that as an obvious etiology.  No signs of trauma.  Low threshold to treat

## 2018-04-26 LAB
BACTERIA UR CULT: SIGNIFICANT CHANGE UP
SPECIMEN SOURCE: SIGNIFICANT CHANGE UP

## 2018-10-01 ENCOUNTER — APPOINTMENT (OUTPATIENT)
Dept: SURGERY | Facility: CLINIC | Age: 33
End: 2018-10-01
Payer: MEDICARE

## 2018-10-01 VITALS — WEIGHT: 178 LBS | BODY MASS INDEX: 26.36 KG/M2 | HEIGHT: 69 IN

## 2018-10-01 PROCEDURE — 99214 OFFICE O/P EST MOD 30 MIN: CPT

## 2018-11-02 ENCOUNTER — OUTPATIENT (OUTPATIENT)
Dept: OUTPATIENT SERVICES | Facility: HOSPITAL | Age: 33
LOS: 1 days | End: 2018-11-02
Payer: MEDICARE

## 2018-11-02 VITALS
DIASTOLIC BLOOD PRESSURE: 82 MMHG | OXYGEN SATURATION: 99 % | TEMPERATURE: 97 F | HEIGHT: 70.5 IN | HEART RATE: 62 BPM | WEIGHT: 171.96 LBS | RESPIRATION RATE: 16 BRPM | SYSTOLIC BLOOD PRESSURE: 124 MMHG

## 2018-11-02 DIAGNOSIS — I10 ESSENTIAL (PRIMARY) HYPERTENSION: ICD-10-CM

## 2018-11-02 DIAGNOSIS — F84.0 AUTISTIC DISORDER: ICD-10-CM

## 2018-11-02 DIAGNOSIS — K43.2 INCISIONAL HERNIA WITHOUT OBSTRUCTION OR GANGRENE: ICD-10-CM

## 2018-11-02 DIAGNOSIS — R56.9 UNSPECIFIED CONVULSIONS: ICD-10-CM

## 2018-11-02 DIAGNOSIS — Z98.890 OTHER SPECIFIED POSTPROCEDURAL STATES: Chronic | ICD-10-CM

## 2018-11-02 DIAGNOSIS — I48.91 UNSPECIFIED ATRIAL FIBRILLATION: ICD-10-CM

## 2018-11-02 LAB
BUN SERPL-MCNC: 14 MG/DL — SIGNIFICANT CHANGE UP (ref 7–23)
CALCIUM SERPL-MCNC: 9.2 MG/DL — SIGNIFICANT CHANGE UP (ref 8.4–10.5)
CHLORIDE SERPL-SCNC: 98 MMOL/L — SIGNIFICANT CHANGE UP (ref 98–107)
CO2 SERPL-SCNC: 30 MMOL/L — SIGNIFICANT CHANGE UP (ref 22–31)
CREAT SERPL-MCNC: 0.98 MG/DL — SIGNIFICANT CHANGE UP (ref 0.5–1.3)
GLUCOSE SERPL-MCNC: 71 MG/DL — SIGNIFICANT CHANGE UP (ref 70–99)
HCT VFR BLD CALC: 37 % — LOW (ref 39–50)
HGB BLD-MCNC: 11.6 G/DL — LOW (ref 13–17)
MCHC RBC-ENTMCNC: 26.3 PG — LOW (ref 27–34)
MCHC RBC-ENTMCNC: 31.4 % — LOW (ref 32–36)
MCV RBC AUTO: 83.9 FL — SIGNIFICANT CHANGE UP (ref 80–100)
NRBC # FLD: 0 — SIGNIFICANT CHANGE UP
PLATELET # BLD AUTO: 237 K/UL — SIGNIFICANT CHANGE UP (ref 150–400)
PMV BLD: 11.8 FL — SIGNIFICANT CHANGE UP (ref 7–13)
POTASSIUM SERPL-MCNC: 4.6 MMOL/L — SIGNIFICANT CHANGE UP (ref 3.5–5.3)
POTASSIUM SERPL-SCNC: 4.6 MMOL/L — SIGNIFICANT CHANGE UP (ref 3.5–5.3)
RBC # BLD: 4.41 M/UL — SIGNIFICANT CHANGE UP (ref 4.2–5.8)
RBC # FLD: 14.5 % — SIGNIFICANT CHANGE UP (ref 10.3–14.5)
SODIUM SERPL-SCNC: 139 MMOL/L — SIGNIFICANT CHANGE UP (ref 135–145)
WBC # BLD: 3 K/UL — LOW (ref 3.8–10.5)
WBC # FLD AUTO: 3 K/UL — LOW (ref 3.8–10.5)

## 2018-11-02 PROCEDURE — 93010 ELECTROCARDIOGRAM REPORT: CPT

## 2018-11-02 RX ORDER — TRAZODONE HCL 50 MG
1 TABLET ORAL
Qty: 0 | Refills: 0 | COMMUNITY

## 2018-11-02 RX ORDER — RISPERIDONE 4 MG/1
1 TABLET ORAL
Qty: 0 | Refills: 0 | COMMUNITY

## 2018-11-02 RX ORDER — DIVALPROEX SODIUM 500 MG/1
1 TABLET, DELAYED RELEASE ORAL
Qty: 0 | Refills: 0 | COMMUNITY

## 2018-11-02 RX ORDER — DRONEDARONE 400 MG/1
1 TABLET, FILM COATED ORAL
Qty: 0 | Refills: 0 | COMMUNITY

## 2018-11-02 RX ORDER — METOPROLOL TARTRATE 50 MG
1 TABLET ORAL
Qty: 0 | Refills: 0 | COMMUNITY

## 2018-11-02 RX ORDER — TEMAZEPAM 15 MG/1
1 CAPSULE ORAL
Qty: 0 | Refills: 0 | COMMUNITY

## 2018-11-02 NOTE — H&P PST ADULT - PROBLEM SELECTOR PLAN 1
Pt scheduled for Incisional Hernia Repair on 11/13/18  Preop instructions provided. Pt verbalized understanding.   Pepcid for GI prophylaxis provided   Chlorhexidine wash with instructions provided. Pt scheduled for Incisional Hernia Repair on 11/13/18  Preop instructions provided. sister verbalized understanding.   Pepcid for GI prophylaxis provided   Chlorhexidine wash with instructions provided.  hx of mental retardation, legal guardian to accompany pt to the hospital on the day of surgery

## 2018-11-02 NOTE — H&P PST ADULT - NEGATIVE MUSCULOSKELETAL SYMPTOMS
no stiffness/no neck pain/no muscle cramps/no muscle weakness/no myalgia/no back pain/no joint swelling/no arthritis

## 2018-11-02 NOTE — H&P PST ADULT - PROBLEM SELECTOR PLAN 4
legal guardian with pt @ PST  instructed to accompany pt on the day of the procedure continue seizure meds as scheduled

## 2018-11-02 NOTE — H&P PST ADULT - PROBLEM SELECTOR PLAN 2
hx of afib, on multaq  s/p cardiac eval, copy in chart hx of afib,   s/p cardiac eval, copy in chart  instructed to take multaq on the morning of the procedure with a sip of water

## 2018-11-02 NOTE — H&P PST ADULT - PMH
Afib  paroxismal  Autism spectrum disorder    Hypertension    Incisional hernia    Seizures Afib    Autism spectrum disorder    Hypertension    Incisional hernia    Seizures

## 2018-11-02 NOTE — H&P PST ADULT - GASTROINTESTINAL COMMENTS
hx of incisional hernia, bulging, see HPI abdominal mass, umbilical region, bulging, soft, nontender; hx of incisional hernia

## 2018-11-02 NOTE — H&P PST ADULT - CARDIOVASCULAR COMMENTS
hx of afib, paroxismal ~3 years ago, on multaq and metoprolol hx of afib, paroxysmal ~3 years ago, on multaq and metoprolol

## 2018-11-02 NOTE — H&P PST ADULT - NEGATIVE ENMT SYMPTOMS
no hearing difficulty/no throat pain/no dysphagia/no sinus symptoms/no nasal congestion/no nasal discharge

## 2018-11-02 NOTE — H&P PST ADULT - HISTORY OF PRESENT ILLNESS
33 y.o. male with hx of mental retardation, hx of paroxismal Afib, seizures, presents to [facility] for evaluation for Incisional Hernia Repair on 11/13/18 33 y.o. male with hx of mental retardation, paroxysmal Afib, seizures (last episode >5 years ago), presents to [facility] for evaluation for Incisional Hernia Repair on 11/13/18

## 2018-11-02 NOTE — H&P PST ADULT - NSANTHOSAYNRD_GEN_A_CORE
No. PAT screening performed.  STOP BANG Legend: 0-2 = LOW Risk; 3-4 = INTERMEDIATE Risk; 5-8 = HIGH Risk

## 2018-11-02 NOTE — H&P PST ADULT - PRIMARY CARE PROVIDER
Dr. Araceli Méndez [phone] Dr. Araceli Méndez [phone], pt has appt with PCP on 11/9/18 for med eval;

## 2018-11-03 PROBLEM — I48.91 UNSPECIFIED ATRIAL FIBRILLATION: Chronic | Status: ACTIVE | Noted: 2018-11-02

## 2018-11-12 ENCOUNTER — TRANSCRIPTION ENCOUNTER (OUTPATIENT)
Age: 33
End: 2018-11-12

## 2018-11-13 ENCOUNTER — INPATIENT (INPATIENT)
Facility: HOSPITAL | Age: 33
LOS: 7 days | Discharge: HOME | End: 2018-11-21
Attending: SURGERY | Admitting: SURGERY
Payer: MEDICARE

## 2018-11-13 ENCOUNTER — APPOINTMENT (OUTPATIENT)
Dept: SURGERY | Facility: HOSPITAL | Age: 33
End: 2018-11-13

## 2018-11-13 VITALS
DIASTOLIC BLOOD PRESSURE: 48 MMHG | HEART RATE: 85 BPM | SYSTOLIC BLOOD PRESSURE: 91 MMHG | HEIGHT: 70.5 IN | RESPIRATION RATE: 16 BRPM | TEMPERATURE: 98 F | OXYGEN SATURATION: 96 % | WEIGHT: 171.96 LBS

## 2018-11-13 DIAGNOSIS — K43.2 INCISIONAL HERNIA WITHOUT OBSTRUCTION OR GANGRENE: ICD-10-CM

## 2018-11-13 DIAGNOSIS — Z98.890 OTHER SPECIFIED POSTPROCEDURAL STATES: Chronic | ICD-10-CM

## 2018-11-13 PROCEDURE — 49560: CPT

## 2018-11-13 PROCEDURE — 49568: CPT

## 2018-11-13 RX ORDER — SODIUM CHLORIDE 9 MG/ML
1000 INJECTION, SOLUTION INTRAVENOUS ONCE
Qty: 0 | Refills: 0 | Status: COMPLETED | OUTPATIENT
Start: 2018-11-13 | End: 2018-11-13

## 2018-11-13 RX ORDER — ENOXAPARIN SODIUM 100 MG/ML
40 INJECTION SUBCUTANEOUS DAILY
Qty: 0 | Refills: 0 | Status: DISCONTINUED | OUTPATIENT
Start: 2018-11-13 | End: 2018-11-20

## 2018-11-13 RX ORDER — RISPERIDONE 4 MG/1
1 TABLET ORAL
Qty: 0 | Refills: 0 | Status: DISCONTINUED | OUTPATIENT
Start: 2018-11-13 | End: 2018-11-16

## 2018-11-13 RX ORDER — METOPROLOL TARTRATE 50 MG
100 TABLET ORAL DAILY
Qty: 0 | Refills: 0 | Status: DISCONTINUED | OUTPATIENT
Start: 2018-11-13 | End: 2018-11-16

## 2018-11-13 RX ORDER — CEFAZOLIN SODIUM 1 G
1000 VIAL (EA) INJECTION EVERY 8 HOURS
Qty: 0 | Refills: 0 | Status: DISCONTINUED | OUTPATIENT
Start: 2018-11-13 | End: 2018-11-15

## 2018-11-13 RX ORDER — DRONEDARONE 400 MG/1
400 TABLET, FILM COATED ORAL
Qty: 0 | Refills: 0 | Status: DISCONTINUED | OUTPATIENT
Start: 2018-11-13 | End: 2018-11-16

## 2018-11-13 RX ORDER — DIVALPROEX SODIUM 500 MG/1
750 TABLET, DELAYED RELEASE ORAL AT BEDTIME
Qty: 0 | Refills: 0 | Status: DISCONTINUED | OUTPATIENT
Start: 2018-11-13 | End: 2018-11-16

## 2018-11-13 RX ORDER — SODIUM CHLORIDE 9 MG/ML
1000 INJECTION, SOLUTION INTRAVENOUS
Qty: 0 | Refills: 0 | Status: DISCONTINUED | OUTPATIENT
Start: 2018-11-13 | End: 2018-11-14

## 2018-11-13 RX ORDER — CLONAZEPAM 1 MG
2 TABLET ORAL AT BEDTIME
Qty: 0 | Refills: 0 | Status: DISCONTINUED | OUTPATIENT
Start: 2018-11-13 | End: 2018-11-19

## 2018-11-13 RX ORDER — TRAZODONE HCL 50 MG
150 TABLET ORAL AT BEDTIME
Qty: 0 | Refills: 0 | Status: DISCONTINUED | OUTPATIENT
Start: 2018-11-13 | End: 2018-11-21

## 2018-11-13 RX ADMIN — SODIUM CHLORIDE 3000 MILLILITER(S): 9 INJECTION, SOLUTION INTRAVENOUS at 21:30

## 2018-11-13 RX ADMIN — DIVALPROEX SODIUM 750 MILLIGRAM(S): 500 TABLET, DELAYED RELEASE ORAL at 22:06

## 2018-11-13 RX ADMIN — Medication 150 MILLIGRAM(S): at 22:07

## 2018-11-13 RX ADMIN — Medication 2 MILLIGRAM(S): at 22:15

## 2018-11-13 RX ADMIN — Medication 100 MILLIGRAM(S): at 22:07

## 2018-11-13 RX ADMIN — SODIUM CHLORIDE 50 MILLILITER(S): 9 INJECTION, SOLUTION INTRAVENOUS at 22:17

## 2018-11-13 RX ADMIN — DRONEDARONE 400 MILLIGRAM(S): 400 TABLET, FILM COATED ORAL at 22:06

## 2018-11-13 RX ADMIN — RISPERIDONE 1 MILLIGRAM(S): 4 TABLET ORAL at 22:08

## 2018-11-13 NOTE — CHART NOTE - NSCHARTNOTEFT_GEN_A_CORE
POST-OPERATIVE NOTE    Subjective:  Patient is s/p incisional hernia repair with mesh. Patient denies any n/v, chest pain, or SOB. Pain is currently well controlled. Recovering appropriately. Patient tolerating regular diet without n/v.     Objective:  Vital Signs Last 24 Hrs  T(C): 36.4 (13 Nov 2018 18:30), Max: 36.4 (13 Nov 2018 12:23)  T(F): 97.5 (13 Nov 2018 18:30), Max: 97.6 (13 Nov 2018 12:23)  HR: 117 (13 Nov 2018 19:00) (81 - 117)  BP: 114/84 (13 Nov 2018 19:00) (91/48 - 129/90)  BP(mean): --  RR: 24 (13 Nov 2018 19:00) (12 - 24)  SpO2: 95% (13 Nov 2018 19:00) (95% - 99%)  I&O's Detail    ceFAZolin   IVPB 1000  ceFAZolin   IVPB 1000  dronedarone 400  enoxaparin Injectable 40  metoprolol succinate     PAST MEDICAL & SURGICAL HISTORY:  Incisional hernia  Seizures  Afib  Hypertension  Autism spectrum disorder  History of incisional hernia repair: ~03/2018        Physical Exam:  General: NAD, resting comfortably in bed  Pulmonary: Nonlabored breathing, no respiratory distress  Cardiovascular: RRR  Abdominal: soft, mildly distended, midline dressing c/d/i, RLQ drain with SS output  Extremities: WWP      LABS:            CAPILLARY BLOOD GLUCOSE          Radiology and Additional Studies:    Assessment:  The patient is a 33y Male who is now several hours post-op from a incisional hernia repair with mesh.     Plan:  - Pain control as needed  - lovenox for DVT ppx  - monitor drain output  - c/w regular diet  - OOB and ambulating as tolerated  - F/u AM labs

## 2018-11-13 NOTE — BRIEF OPERATIVE NOTE - PROCEDURE
<<-----Click on this checkbox to enter Procedure Incisional hernia repair with mesh  11/13/2018  [place]  Active  [facility]

## 2018-11-14 ENCOUNTER — TRANSCRIPTION ENCOUNTER (OUTPATIENT)
Age: 33
End: 2018-11-14

## 2018-11-14 LAB
BUN SERPL-MCNC: 15 MG/DL — SIGNIFICANT CHANGE UP (ref 7–23)
CALCIUM SERPL-MCNC: 8.5 MG/DL — SIGNIFICANT CHANGE UP (ref 8.4–10.5)
CHLORIDE SERPL-SCNC: 104 MMOL/L — SIGNIFICANT CHANGE UP (ref 98–107)
CO2 SERPL-SCNC: 26 MMOL/L — SIGNIFICANT CHANGE UP (ref 22–31)
CREAT SERPL-MCNC: 0.95 MG/DL — SIGNIFICANT CHANGE UP (ref 0.5–1.3)
GLUCOSE SERPL-MCNC: 106 MG/DL — HIGH (ref 70–99)
HCT VFR BLD CALC: 38.8 % — LOW (ref 39–50)
HGB BLD-MCNC: 12.4 G/DL — LOW (ref 13–17)
MAGNESIUM SERPL-MCNC: 1.7 MG/DL — SIGNIFICANT CHANGE UP (ref 1.6–2.6)
MCHC RBC-ENTMCNC: 26.3 PG — LOW (ref 27–34)
MCHC RBC-ENTMCNC: 32 % — SIGNIFICANT CHANGE UP (ref 32–36)
MCV RBC AUTO: 82.4 FL — SIGNIFICANT CHANGE UP (ref 80–100)
NRBC # FLD: 0 — SIGNIFICANT CHANGE UP
PHOSPHATE SERPL-MCNC: 3.2 MG/DL — SIGNIFICANT CHANGE UP (ref 2.5–4.5)
PLATELET # BLD AUTO: 245 K/UL — SIGNIFICANT CHANGE UP (ref 150–400)
PMV BLD: 12.2 FL — SIGNIFICANT CHANGE UP (ref 7–13)
POTASSIUM SERPL-MCNC: 4.1 MMOL/L — SIGNIFICANT CHANGE UP (ref 3.5–5.3)
POTASSIUM SERPL-SCNC: 4.1 MMOL/L — SIGNIFICANT CHANGE UP (ref 3.5–5.3)
RBC # BLD: 4.71 M/UL — SIGNIFICANT CHANGE UP (ref 4.2–5.8)
RBC # FLD: 14.1 % — SIGNIFICANT CHANGE UP (ref 10.3–14.5)
SODIUM SERPL-SCNC: 138 MMOL/L — SIGNIFICANT CHANGE UP (ref 135–145)
WBC # BLD: 10.95 K/UL — HIGH (ref 3.8–10.5)
WBC # FLD AUTO: 10.95 K/UL — HIGH (ref 3.8–10.5)

## 2018-11-14 RX ADMIN — Medication 100 MILLIGRAM(S): at 05:51

## 2018-11-14 RX ADMIN — Medication 100 MILLIGRAM(S): at 06:11

## 2018-11-14 RX ADMIN — DRONEDARONE 400 MILLIGRAM(S): 400 TABLET, FILM COATED ORAL at 18:52

## 2018-11-14 RX ADMIN — Medication 100 MILLIGRAM(S): at 14:00

## 2018-11-14 RX ADMIN — SODIUM CHLORIDE 50 MILLILITER(S): 9 INJECTION, SOLUTION INTRAVENOUS at 08:36

## 2018-11-14 RX ADMIN — Medication 2 MILLIGRAM(S): at 22:02

## 2018-11-14 RX ADMIN — DIVALPROEX SODIUM 750 MILLIGRAM(S): 500 TABLET, DELAYED RELEASE ORAL at 22:03

## 2018-11-14 RX ADMIN — RISPERIDONE 1 MILLIGRAM(S): 4 TABLET ORAL at 05:51

## 2018-11-14 RX ADMIN — DRONEDARONE 400 MILLIGRAM(S): 400 TABLET, FILM COATED ORAL at 05:51

## 2018-11-14 RX ADMIN — Medication 100 MILLIGRAM(S): at 22:03

## 2018-11-14 RX ADMIN — ENOXAPARIN SODIUM 40 MILLIGRAM(S): 100 INJECTION SUBCUTANEOUS at 12:00

## 2018-11-14 RX ADMIN — Medication 150 MILLIGRAM(S): at 22:02

## 2018-11-14 RX ADMIN — RISPERIDONE 1 MILLIGRAM(S): 4 TABLET ORAL at 22:02

## 2018-11-14 NOTE — DISCHARGE NOTE ADULT - COMMUNITY RESOURCES
[facility] [phone] arranged to transport patient/sister home at 5PM today ( Trip #[id number]) [facility] [phone] to arrange transportation. [facility] [phone] to arrange transportation. Trip # [id number]

## 2018-11-14 NOTE — DISCHARGE NOTE ADULT - HOME CARE AGENCY
[facility] services reinstated for 11/16/2018 [facility] services reinstated for 11/21/2018/[phone]  ext. [phone]

## 2018-11-14 NOTE — DISCHARGE NOTE ADULT - MEDICATION SUMMARY - MEDICATIONS TO TAKE
I will START or STAY ON the medications listed below when I get home from the hospital:    Multaq 400 mg oral tablet  -- 1 tab(s) by mouth 2 times a day  -- Indication: For Home med    divalproex sodium  -- 750 milligram(s) by mouth once a day (at bedtime)  -- Indication: For Home med    clonazePAM 2 mg oral tablet  -- 1 tab(s) by mouth once a day (at bedtime)  -- Indication: For Home med    traZODone 100 mg oral tablet  -- 1.5 tab(s) by mouth once a day (at bedtime)  -- Indication: For Home med    risperiDONE 1 mg oral tablet  -- 1 tab(s) by mouth 2 times a day  -- Indication: For Home med    metoprolol succinate 100 mg oral tablet, extended release  -- 1 tab(s) by mouth once a day AM  -- Indication: For Home med I will START or STAY ON the medications listed below when I get home from the hospital:    dronedarone 400 mg oral tablet  -- 1 tab(s) by mouth 2 times a day  -- Indication: For Afib    clonazePAM 2 mg oral tablet  -- 1 tab(s) by mouth once a day (at bedtime)  -- Indication: For Home med    divalproex sodium  -- 750 milligram(s) by mouth once a day (at bedtime)  -- Indication: For Home med    traZODone 150 mg oral tablet  -- 1 tab(s) by mouth once a day (at bedtime)  -- Indication: For Home med    risperiDONE 1 mg oral tablet  -- 1 tab(s) by mouth 2 times a day  -- Indication: For Home med    metoprolol tartrate 50 mg oral tablet  -- 1 tab(s) by mouth 3 times a day  -- Indication: For Afib

## 2018-11-14 NOTE — DISCHARGE NOTE ADULT - MEDICATION SUMMARY - MEDICATIONS TO CHANGE
I will SWITCH the dose or number of times a day I take the medications listed below when I get home from the hospital:  None I will SWITCH the dose or number of times a day I take the medications listed below when I get home from the hospital:    metoprolol succinate 100 mg oral tablet, extended release  -- 1 tab(s) by mouth once a day AM

## 2018-11-14 NOTE — DISCHARGE NOTE ADULT - PLAN OF CARE
s/p incisional hernia repair WOUND CARE:  Please keep incisions clean and dry. Please do not Scrub or rub incisions. Do not use lotion or powder on incisions.   BATHING: You may shower and/or sponge bathe. You may use warm soapy water in the shower and rinse, pat dry.  ACTIVITY: No heavy lifting or straining. Otherwise, you may return to your usual level of physical activity. If you are taking narcotic pain medication DO NOT drive a car, operate machinery or make important decisions.  DIET: Return to your usual diet.  NOTIFY YOUR SURGEON IF YOU HAVE: any bleeding that does not stop, any pus draining from your wound(s), any fever (over 100.4 F) persistent nausea/vomiting, stop passing gas/having bowel movements, or if your pain is not controlled on your discharge pain medications, unable to urinate.  Please follow up with your primary care physician in one week regarding your hospitalization, bring copies of your discharge paperwork.  Please follow up with your surgeon, Dr. Beckford as an outpatient, please call  [phone] to schedule your appointment while in the hospital you had an episode of uncontrolled afib for which Cardiology was consulted and your home medications were adjusted. You are being sent home on metoprolol 50mg three times a day. Please continue and follow up with your Cardiologist as an outpatient in 1 week While in the hospital you had an episode of uncontrolled afib for which Cardiology was consulted and your home medications were adjusted. You are being sent home on metoprolol 50mg three times a day. Please hold dose for a heart rate less than 60 or systolic blood pressure less than 100. Please follow up with your Cardiologist as an outpatient in 1 week

## 2018-11-14 NOTE — DISCHARGE NOTE ADULT - PATIENT PORTAL LINK FT
You can access the [facility] Patient Portal, offered by [facility], by registering with the following website: [email or web address]

## 2018-11-14 NOTE — DISCHARGE NOTE ADULT - CARE PROVIDER_API CALL
Minh Beckford), ColonRectal Surgery; Surgery  [address] [place] [address]  Phone: [phone]  Fax: [phone]

## 2018-11-14 NOTE — DISCHARGE NOTE ADULT - CARE PLAN
Principal Discharge DX:	Incisional hernia  Goal:	s/p incisional hernia repair  Assessment and plan of treatment:	WOUND CARE:  Please keep incisions clean and dry. Please do not Scrub or rub incisions. Do not use lotion or powder on incisions.   BATHING: You may shower and/or sponge bathe. You may use warm soapy water in the shower and rinse, pat dry.  ACTIVITY: No heavy lifting or straining. Otherwise, you may return to your usual level of physical activity. If you are taking narcotic pain medication DO NOT drive a car, operate machinery or make important decisions.  DIET: Return to your usual diet.  NOTIFY YOUR SURGEON IF YOU HAVE: any bleeding that does not stop, any pus draining from your wound(s), any fever (over 100.4 F) persistent nausea/vomiting, stop passing gas/having bowel movements, or if your pain is not controlled on your discharge pain medications, unable to urinate.  Please follow up with your primary care physician in one week regarding your hospitalization, bring copies of your discharge paperwork.  Please follow up with your surgeon, Dr. Beckford as an outpatient, please call  [phone] to schedule your appointment Principal Discharge DX:	Incisional hernia  Goal:	s/p incisional hernia repair  Assessment and plan of treatment:	WOUND CARE:  Please keep incisions clean and dry. Please do not Scrub or rub incisions. Do not use lotion or powder on incisions.   BATHING: You may shower and/or sponge bathe. You may use warm soapy water in the shower and rinse, pat dry.  ACTIVITY: No heavy lifting or straining. Otherwise, you may return to your usual level of physical activity. If you are taking narcotic pain medication DO NOT drive a car, operate machinery or make important decisions.  DIET: Return to your usual diet.  NOTIFY YOUR SURGEON IF YOU HAVE: any bleeding that does not stop, any pus draining from your wound(s), any fever (over 100.4 F) persistent nausea/vomiting, stop passing gas/having bowel movements, or if your pain is not controlled on your discharge pain medications, unable to urinate.  Please follow up with your primary care physician in one week regarding your hospitalization, bring copies of your discharge paperwork.  Please follow up with your surgeon, Dr. Beckford as an outpatient, please call  [phone] to schedule your appointment  Secondary Diagnosis:	Afib  Assessment and plan of treatment:	while in the hospital you had an episode of uncontrolled afib for which Cardiology was consulted and your home medications were adjusted. You are being sent home on metoprolol 50mg three times a day. Please continue and follow up with your Cardiologist as an outpatient in 1 week Principal Discharge DX:	Incisional hernia  Goal:	s/p incisional hernia repair  Assessment and plan of treatment:	WOUND CARE:  Please keep incisions clean and dry. Please do not Scrub or rub incisions. Do not use lotion or powder on incisions.   BATHING: You may shower and/or sponge bathe. You may use warm soapy water in the shower and rinse, pat dry.  ACTIVITY: No heavy lifting or straining. Otherwise, you may return to your usual level of physical activity. If you are taking narcotic pain medication DO NOT drive a car, operate machinery or make important decisions.  DIET: Return to your usual diet.  NOTIFY YOUR SURGEON IF YOU HAVE: any bleeding that does not stop, any pus draining from your wound(s), any fever (over 100.4 F) persistent nausea/vomiting, stop passing gas/having bowel movements, or if your pain is not controlled on your discharge pain medications, unable to urinate.  Please follow up with your primary care physician in one week regarding your hospitalization, bring copies of your discharge paperwork.  Please follow up with your surgeon, Dr. Beckford as an outpatient, please call  [phone] to schedule your appointment  Secondary Diagnosis:	Afib  Assessment and plan of treatment:	While in the hospital you had an episode of uncontrolled afib for which Cardiology was consulted and your home medications were adjusted. You are being sent home on metoprolol 50mg three times a day. Please hold dose for a heart rate less than 60 or systolic blood pressure less than 100. Please follow up with your Cardiologist as an outpatient in 1 week

## 2018-11-14 NOTE — DISCHARGE NOTE ADULT - HOSPITAL COURSE
34 yo M with hx of mental retardation, paroxysmal Afib, seizures (last episode >5 years ago), and hx incisional hernia    11/13 pt same day admission for scheduled incisional hernia repair. Pt tolerated procedure well.    Pt's diet slowly advanced as tolerated.    Per Attending pt now stable for discharge home. Pt tolerating diet and pain well controlled. Pt to follow up with Dr Beckford as an outpatient, instructed to call to schedule appointment 32 yo M with hx of mental retardation, paroxysmal Afib, seizures (last episode >5 years ago), and hx incisional hernia    11/13 pt same day admission for scheduled incisional hernia repair. Pt tolerated procedure well.    Post op pt hypotensive for which received bolus IVF and pt hemodynamically improved.    Pt's diet slowly advanced as tolerated.    Per Attending pt now stable for discharge home. Pt tolerating diet and pain well controlled. Pt to follow up with Dr Beckford as an outpatient, instructed to call to schedule appointment 32 yo M with hx of mental retardation, paroxysmal Afib, seizures (last episode >5 years ago), and hx incisional hernia    11/13 pt same day admission for scheduled incisional hernia repair. Pt tolerated procedure well.    Post op pt hypotensive for which received bolus IVF and pt hemodynamically improved.    Pt's diet slowly advanced as tolerated. COLLETTE drain removed prior to discharge.    Per Attending pt now stable for discharge home. Pt tolerating diet and pain well controlled. Pt to follow up with Dr Beckford as an outpatient, instructed to call to schedule appointment 32 yo M with hx of mental retardation, paroxysmal Afib, seizures (last episode >5 years ago), and hx incisional hernia    11/13 pt same day admission for scheduled incisional hernia repair. Pt tolerated procedure well.    Post op pt hypotensive for which received bolus IVF and pt hemodynamically improved.    Pt's diet slowly advanced as tolerated. COLLETTE drain removed.    11/15 plan for dc home but pt episode emesis after eating dinner and concern for aspiration. CXR performed and showed small left pleural effusion and bibasilar atelectasis.    11/16 Pt with episode emesis, desaturation, and bradycardia, EKG performed and showed sinus bradycardia w/ PVCs, placed on nasal cannula, NGT placed, and AXR/CXR performed and showed nasogastric tube with tip in stomach. Multiple dilated loops of small and large bowel consistent with postoperative ileus.     11/17 CT A/P performed and showed fluid-filled and dilated loops of small and large bowel with no clear transition point suggesting ileus. Pt with episodes tachycardia, EKG showed afib RVR, . Cardiology consulted, provided med recs for rate control, pt transferred to telemetry, and rec echo to R/O thrombus and evaluate LV function and wall motion abnormality    Pt with return bowel function. NGT removed and diet slowly advanced as tolerated.    11/19 Echo performed and showed 1. Normal mitral valve. Mild mitral regurgitation. 2. Normal left ventricular internal dimensions and wall thicknesses. 3. Endocardium not well visualized; grossly normal left ventricular systolic function.  4. Normal right ventricular size and function. 5. Normal tricuspid valve.   Moderate tricuspid regurgitation. 6. Estimated pulmonary artery systolic pressure equals 47 mm Hg, assuming right atrial pressure equals 10  mm Hg, consistent with mild pulmonary hypertension. 7. Normal pericardium with small pericardial effusion.8. Right pleural effusion.    Per Attending pt now stable for discharge home. Pt tolerating diet and pain well controlled. Pt to follow up with Dr Beckford as an outpatient, instructed to call to schedule appointment 32 yo M with hx of mental retardation, paroxysmal Afib, seizures (last episode >5 years ago), and hx incisional hernia    11/13 pt same day admission for scheduled incisional hernia repair. Pt tolerated procedure well.    Post op pt hypotensive for which received bolus IVF and pt hemodynamically improved.    Pt's diet slowly advanced as tolerated. COLLETTE drain removed.    11/15 plan for dc home but pt episode emesis after eating dinner and concern for aspiration. CXR performed and showed small left pleural effusion and bibasilar atelectasis.    11/16 Pt with episode emesis, desaturation, and bradycardia, EKG performed and showed sinus bradycardia w/ PVCs, placed on nasal cannula, NGT placed, and AXR/CXR performed and showed nasogastric tube with tip in stomach. Multiple dilated loops of small and large bowel consistent with postoperative ileus.     11/17 CT A/P performed and showed fluid-filled and dilated loops of small and large bowel with no clear transition point suggesting ileus. Pt with episodes tachycardia, EKG showed afib RVR, . Cardiology consulted, provided med recs for rate control, pt transferred to telemetry, and rec echo to R/O thrombus and evaluate LV function and wall motion abnormality    Pt with return bowel function. NGT removed and diet slowly advanced as tolerated.    11/19 Echo performed and showed 1. Normal mitral valve. Mild mitral regurgitation. 2. Normal left ventricular internal dimensions and wall thicknesses. 3. Endocardium not well visualized; grossly normal left ventricular systolic function.  4. Normal right ventricular size and function. 5. Normal tricuspid valve.   Moderate tricuspid regurgitation. 6. Estimated pulmonary artery systolic pressure equals 47 mm Hg, assuming right atrial pressure equals 10  mm Hg, consistent with mild pulmonary hypertension. 7. Normal pericardium with small pericardial effusion.8. Right pleural effusion.      PT consulted and recommend pt be dc'd home    Per Attending pt now stable for discharge home. Pt tolerating diet and pain well controlled. Pt to follow up with Dr Beckford as an outpatient, instructed to call to schedule appointment. Per Cards pt to be discharged on current medications and follow up with his Cardiologist as an outpatient 32 yo M with hx of mental retardation, paroxysmal Afib, seizures (last episode >5 years ago), and hx incisional hernia    11/13 pt same day admission for scheduled incisional hernia repair. Pt tolerated procedure well.    Post op pt hypotensive for which received bolus IVF and pt hemodynamically improved.    Pt's diet slowly advanced as tolerated. COLLETTE drain removed.    11/15 plan for dc home but pt episode emesis after eating dinner and concern for aspiration. CXR performed and showed small left pleural effusion and bibasilar atelectasis.    11/16 Pt with episode emesis, desaturation, and bradycardia, EKG performed and showed sinus bradycardia w/ PVCs, placed on nasal cannula, NGT placed, and AXR/CXR performed and showed nasogastric tube with tip in stomach. Multiple dilated loops of small and large bowel consistent with postoperative ileus.     11/17 CT A/P performed and showed fluid-filled and dilated loops of small and large bowel with no clear transition point suggesting ileus. Pt with episodes tachycardia, EKG showed afib RVR, . Cardiology consulted, provided med recs for rate control, pt transferred to telemetry, and rec echo to R/O thrombus and evaluate LV function and wall motion abnormality    Pt with return bowel function. NGT removed and diet slowly advanced as tolerated.    11/19 Echo performed and showed 1. Normal mitral valve. Mild mitral regurgitation. 2. Normal left ventricular internal dimensions and wall thicknesses. 3. Endocardium not well visualized; grossly normal left ventricular systolic function.  4. Normal right ventricular size and function. 5. Normal tricuspid valve.   Moderate tricuspid regurgitation. 6. Estimated pulmonary artery systolic pressure equals 47 mm Hg, assuming right atrial pressure equals 10  mm Hg, consistent with mild pulmonary hypertension. 7. Normal pericardium with small pericardial effusion.8. Right pleural effusion.      PT consulted and recommend pt be dc'd home    Per Attending pt now stable for discharge home. Pt tolerating diet and pain well controlled. Pt to follow up with Dr Beckford as an outpatient, instructed to call to schedule appointment. Per Cards pt to be discharged on current regimen of lopressor 50mg TID (hold parameters), Multaq 400mg BID. Outpatient cardiology follow up for medication management (has an appointment next week).

## 2018-11-14 NOTE — DISCHARGE NOTE ADULT - CONDITIONS AT DISCHARGE
Vital signs stable, tolerated regular diet, mid abdominal staples with dressing intact, COLLETTE d/c by MD.

## 2018-11-15 LAB
ALBUMIN SERPL ELPH-MCNC: 3.2 G/DL — LOW (ref 3.3–5)
ALP SERPL-CCNC: 44 U/L — SIGNIFICANT CHANGE UP (ref 40–120)
ALT FLD-CCNC: 6 U/L — SIGNIFICANT CHANGE UP (ref 4–41)
AST SERPL-CCNC: 16 U/L — SIGNIFICANT CHANGE UP (ref 4–40)
BILIRUB SERPL-MCNC: 0.6 MG/DL — SIGNIFICANT CHANGE UP (ref 0.2–1.2)
BUN SERPL-MCNC: 14 MG/DL — SIGNIFICANT CHANGE UP (ref 7–23)
CALCIUM SERPL-MCNC: 8.3 MG/DL — LOW (ref 8.4–10.5)
CHLORIDE SERPL-SCNC: 100 MMOL/L — SIGNIFICANT CHANGE UP (ref 98–107)
CO2 SERPL-SCNC: 28 MMOL/L — SIGNIFICANT CHANGE UP (ref 22–31)
CREAT SERPL-MCNC: 1.05 MG/DL — SIGNIFICANT CHANGE UP (ref 0.5–1.3)
GLUCOSE SERPL-MCNC: 94 MG/DL — SIGNIFICANT CHANGE UP (ref 70–99)
HCT VFR BLD CALC: 37.6 % — LOW (ref 39–50)
HGB BLD-MCNC: 12 G/DL — LOW (ref 13–17)
MAGNESIUM SERPL-MCNC: 1.7 MG/DL — SIGNIFICANT CHANGE UP (ref 1.6–2.6)
MCHC RBC-ENTMCNC: 26.8 PG — LOW (ref 27–34)
MCHC RBC-ENTMCNC: 31.9 % — LOW (ref 32–36)
MCV RBC AUTO: 84.1 FL — SIGNIFICANT CHANGE UP (ref 80–100)
NRBC # FLD: 0 — SIGNIFICANT CHANGE UP
PHOSPHATE SERPL-MCNC: 3.4 MG/DL — SIGNIFICANT CHANGE UP (ref 2.5–4.5)
PLATELET # BLD AUTO: 235 K/UL — SIGNIFICANT CHANGE UP (ref 150–400)
PMV BLD: 11.9 FL — SIGNIFICANT CHANGE UP (ref 7–13)
POTASSIUM SERPL-MCNC: 4 MMOL/L — SIGNIFICANT CHANGE UP (ref 3.5–5.3)
POTASSIUM SERPL-SCNC: 4 MMOL/L — SIGNIFICANT CHANGE UP (ref 3.5–5.3)
PROT SERPL-MCNC: 5.8 G/DL — LOW (ref 6–8.3)
RBC # BLD: 4.47 M/UL — SIGNIFICANT CHANGE UP (ref 4.2–5.8)
RBC # FLD: 14.2 % — SIGNIFICANT CHANGE UP (ref 10.3–14.5)
SODIUM SERPL-SCNC: 137 MMOL/L — SIGNIFICANT CHANGE UP (ref 135–145)
WBC # BLD: 7.64 K/UL — SIGNIFICANT CHANGE UP (ref 3.8–10.5)
WBC # FLD AUTO: 7.64 K/UL — SIGNIFICANT CHANGE UP (ref 3.8–10.5)

## 2018-11-15 PROCEDURE — 71045 X-RAY EXAM CHEST 1 VIEW: CPT | Mod: 26

## 2018-11-15 RX ORDER — MAGNESIUM SULFATE 500 MG/ML
2 VIAL (ML) INJECTION ONCE
Qty: 0 | Refills: 0 | Status: COMPLETED | OUTPATIENT
Start: 2018-11-15 | End: 2018-11-15

## 2018-11-15 RX ADMIN — Medication 100 MILLIGRAM(S): at 06:00

## 2018-11-15 RX ADMIN — Medication 2 MILLIGRAM(S): at 21:48

## 2018-11-15 RX ADMIN — Medication 150 MILLIGRAM(S): at 21:48

## 2018-11-15 RX ADMIN — DRONEDARONE 400 MILLIGRAM(S): 400 TABLET, FILM COATED ORAL at 21:49

## 2018-11-15 RX ADMIN — RISPERIDONE 1 MILLIGRAM(S): 4 TABLET ORAL at 06:00

## 2018-11-15 RX ADMIN — ENOXAPARIN SODIUM 40 MILLIGRAM(S): 100 INJECTION SUBCUTANEOUS at 11:51

## 2018-11-15 RX ADMIN — DRONEDARONE 400 MILLIGRAM(S): 400 TABLET, FILM COATED ORAL at 06:00

## 2018-11-15 RX ADMIN — Medication 50 GRAM(S): at 09:36

## 2018-11-15 RX ADMIN — DIVALPROEX SODIUM 750 MILLIGRAM(S): 500 TABLET, DELAYED RELEASE ORAL at 21:48

## 2018-11-15 RX ADMIN — Medication 100 MILLIGRAM(S): at 06:01

## 2018-11-15 RX ADMIN — RISPERIDONE 1 MILLIGRAM(S): 4 TABLET ORAL at 21:48

## 2018-11-15 NOTE — CHART NOTE - NSCHARTNOTEFT_GEN_A_CORE
called by pt's RN for concern for aspiration (post vomiting) due to diffuse crackles heard upon pulm auscultation    Examined the pt, heard expiratory crackles b/l lower quadrants. Also noted pt coughing frequently. Due to pt's recent vomiting episode and hx of aspiration pneumonia per family member at bedside, CXR ordered and chest PT ordered.    CXR showed clear lungs per radiology.    Will continue to monitor    [facility]  [id number]

## 2018-11-15 NOTE — PROGRESS NOTE ADULT - SUBJECTIVE AND OBJECTIVE BOX
GENERAL SURGERY DAILY PROGRESS NOTE:       Subjective:  Pt seen and examined at bedside. No acute events overnight. Pain controlled. Tolerating reg diet. (-)N/V, (+) flatus        Objective:    PE:  Gen: resting comfortably in bed, NAD  Resp: breathing comfortably  Abd: soft, NT, ND. No rebound/guarding. Incision c/d/i. COLLETTE SS    Vital Signs Last 24 Hrs  T(C): 36.6 (15 Nov 2018 10:00), Max: 36.7 (15 Nov 2018 06:02)  T(F): 97.9 (15 Nov 2018 10:00), Max: 98 (15 Nov 2018 06:02)  HR: 75 (15 Nov 2018 10:00) (75 - 96)  BP: 141/93 (15 Nov 2018 10:00) (98/57 - 141/93)  BP(mean): --  RR: 18 (15 Nov 2018 10:00) (14 - 18)  SpO2: 92% (15 Nov 2018 10:00) (92% - 99%)    I&O's Detail    14 Nov 2018 07:01  -  15 Nov 2018 07:00  --------------------------------------------------------  IN:    lactated ringers.: 450 mL    Oral Fluid: 540 mL  Total IN: 990 mL    OUT:    Bulb: 42.5 mL  Total OUT: 42.5 mL    Total NET: 947.5 mL          Daily     Daily     MEDICATIONS  (STANDING):  clonazePAM Tablet 2 milliGRAM(s) Oral at bedtime  diVALproex  milliGRAM(s) Oral at bedtime  dronedarone 400 milliGRAM(s) Oral two times a day  enoxaparin Injectable 40 milliGRAM(s) SubCutaneous daily  metoprolol succinate  milliGRAM(s) Oral daily  risperiDONE   Tablet 1 milliGRAM(s) Oral two times a day  traZODone 150 milliGRAM(s) Oral at bedtime    MEDICATIONS  (PRN):      LABS:                        12.0   7.64  )-----------( 235      ( 15 Nov 2018 06:00 )             37.6     11-15    137  |  100  |  14  ----------------------------<  94  4.0   |  28  |  1.05    Ca    8.3<L>      15 Nov 2018 06:00  Phos  3.4     11-15  Mg     1.7     11-15    TPro  5.8<L>  /  Alb  3.2<L>  /  TBili  0.6  /  DBili  x   /  AST  16  /  ALT  6   /  AlkPhos  44  11-15          RADIOLOGY & ADDITIONAL STUDIES:

## 2018-11-15 NOTE — PROGRESS NOTE ADULT - ASSESSMENT
35M s/p incisional hernia repair with mesh 11/13, recovering well    - Regular diet  - Pain control  - DVT ppx  - Dispo: home today 35M s/p incisional hernia repair with mesh 11/13, recovering well    - DC drain   - Regular diet  - Pain control  - DVT ppx  - Dispo: home today

## 2018-11-16 LAB
BUN SERPL-MCNC: 16 MG/DL — SIGNIFICANT CHANGE UP (ref 7–23)
CALCIUM SERPL-MCNC: 8.8 MG/DL — SIGNIFICANT CHANGE UP (ref 8.4–10.5)
CHLORIDE SERPL-SCNC: 94 MMOL/L — LOW (ref 98–107)
CO2 SERPL-SCNC: 31 MMOL/L — SIGNIFICANT CHANGE UP (ref 22–31)
CREAT SERPL-MCNC: 0.92 MG/DL — SIGNIFICANT CHANGE UP (ref 0.5–1.3)
GLUCOSE SERPL-MCNC: 92 MG/DL — SIGNIFICANT CHANGE UP (ref 70–99)
HCT VFR BLD CALC: 36.9 % — LOW (ref 39–50)
HGB BLD-MCNC: 11.9 G/DL — LOW (ref 13–17)
MAGNESIUM SERPL-MCNC: 2 MG/DL — SIGNIFICANT CHANGE UP (ref 1.6–2.6)
MCHC RBC-ENTMCNC: 26.7 PG — LOW (ref 27–34)
MCHC RBC-ENTMCNC: 32.2 % — SIGNIFICANT CHANGE UP (ref 32–36)
MCV RBC AUTO: 82.9 FL — SIGNIFICANT CHANGE UP (ref 80–100)
NRBC # FLD: 0 — SIGNIFICANT CHANGE UP
PHOSPHATE SERPL-MCNC: 3.8 MG/DL — SIGNIFICANT CHANGE UP (ref 2.5–4.5)
PLATELET # BLD AUTO: 217 K/UL — SIGNIFICANT CHANGE UP (ref 150–400)
PMV BLD: 11.8 FL — SIGNIFICANT CHANGE UP (ref 7–13)
POTASSIUM SERPL-MCNC: 4.1 MMOL/L — SIGNIFICANT CHANGE UP (ref 3.5–5.3)
POTASSIUM SERPL-SCNC: 4.1 MMOL/L — SIGNIFICANT CHANGE UP (ref 3.5–5.3)
RBC # BLD: 4.45 M/UL — SIGNIFICANT CHANGE UP (ref 4.2–5.8)
RBC # FLD: 14 % — SIGNIFICANT CHANGE UP (ref 10.3–14.5)
SODIUM SERPL-SCNC: 136 MMOL/L — SIGNIFICANT CHANGE UP (ref 135–145)
WBC # BLD: 5.08 K/UL — SIGNIFICANT CHANGE UP (ref 3.8–10.5)
WBC # FLD AUTO: 5.08 K/UL — SIGNIFICANT CHANGE UP (ref 3.8–10.5)

## 2018-11-16 PROCEDURE — 71045 X-RAY EXAM CHEST 1 VIEW: CPT | Mod: 26

## 2018-11-16 PROCEDURE — 74018 RADEX ABDOMEN 1 VIEW: CPT | Mod: 26

## 2018-11-16 PROCEDURE — 93010 ELECTROCARDIOGRAM REPORT: CPT

## 2018-11-16 RX ORDER — DIVALPROEX SODIUM 500 MG/1
750 TABLET, DELAYED RELEASE ORAL AT BEDTIME
Qty: 0 | Refills: 0 | Status: DISCONTINUED | OUTPATIENT
Start: 2018-11-16 | End: 2018-11-16

## 2018-11-16 RX ORDER — DIVALPROEX SODIUM 500 MG/1
375 TABLET, DELAYED RELEASE ORAL
Qty: 0 | Refills: 0 | Status: DISCONTINUED | OUTPATIENT
Start: 2018-11-16 | End: 2018-11-18

## 2018-11-16 RX ORDER — RISPERIDONE 4 MG/1
1 TABLET ORAL
Qty: 0 | Refills: 0 | Status: DISCONTINUED | OUTPATIENT
Start: 2018-11-16 | End: 2018-11-18

## 2018-11-16 RX ORDER — METOPROLOL TARTRATE 50 MG
10 TABLET ORAL EVERY 6 HOURS
Qty: 0 | Refills: 0 | Status: DISCONTINUED | OUTPATIENT
Start: 2018-11-16 | End: 2018-11-17

## 2018-11-16 RX ORDER — DRONEDARONE 400 MG/1
400 TABLET, FILM COATED ORAL
Qty: 0 | Refills: 0 | Status: DISCONTINUED | OUTPATIENT
Start: 2018-11-16 | End: 2018-11-18

## 2018-11-16 RX ORDER — BENZOCAINE AND MENTHOL 5; 1 G/100ML; G/100ML
1 LIQUID ORAL THREE TIMES A DAY
Qty: 0 | Refills: 0 | Status: DISCONTINUED | OUTPATIENT
Start: 2018-11-16 | End: 2018-11-21

## 2018-11-16 RX ORDER — DEXTROSE MONOHYDRATE, SODIUM CHLORIDE, AND POTASSIUM CHLORIDE 50; .745; 4.5 G/1000ML; G/1000ML; G/1000ML
1000 INJECTION, SOLUTION INTRAVENOUS
Qty: 0 | Refills: 0 | Status: DISCONTINUED | OUTPATIENT
Start: 2018-11-16 | End: 2018-11-19

## 2018-11-16 RX ORDER — DRONEDARONE 400 MG/1
400 TABLET, FILM COATED ORAL
Qty: 0 | Refills: 0 | Status: DISCONTINUED | OUTPATIENT
Start: 2018-11-16 | End: 2018-11-16

## 2018-11-16 RX ADMIN — Medication 150 MILLIGRAM(S): at 23:18

## 2018-11-16 RX ADMIN — Medication 2 MILLIGRAM(S): at 23:18

## 2018-11-16 RX ADMIN — DIVALPROEX SODIUM 375 MILLIGRAM(S): 500 TABLET, DELAYED RELEASE ORAL at 23:18

## 2018-11-16 RX ADMIN — DRONEDARONE 400 MILLIGRAM(S): 400 TABLET, FILM COATED ORAL at 05:26

## 2018-11-16 RX ADMIN — DRONEDARONE 400 MILLIGRAM(S): 400 TABLET, FILM COATED ORAL at 23:17

## 2018-11-16 RX ADMIN — DEXTROSE MONOHYDRATE, SODIUM CHLORIDE, AND POTASSIUM CHLORIDE 100 MILLILITER(S): 50; .745; 4.5 INJECTION, SOLUTION INTRAVENOUS at 14:29

## 2018-11-16 RX ADMIN — RISPERIDONE 1 MILLIGRAM(S): 4 TABLET ORAL at 05:26

## 2018-11-16 RX ADMIN — ENOXAPARIN SODIUM 40 MILLIGRAM(S): 100 INJECTION SUBCUTANEOUS at 14:29

## 2018-11-16 RX ADMIN — Medication 100 MILLIGRAM(S): at 05:26

## 2018-11-16 RX ADMIN — Medication 10 MILLIGRAM(S): at 23:25

## 2018-11-16 RX ADMIN — RISPERIDONE 1 MILLIGRAM(S): 4 TABLET ORAL at 23:17

## 2018-11-16 NOTE — PROGRESS NOTE ADULT - SUBJECTIVE AND OBJECTIVE BOX
GENERAL SURGERY DAILY PROGRESS NOTE:       Subjective:  Pt seen and examined at bedside. Pt vomited last night after regular diet. CXR was unremarkable. (-)N/V.         Objective:    PE:  Gen: resting comfortably in bed, NAD  Resp: breathing comfortably  Abd: soft, NT, moderately distended. No rebound/guarding. Incision c/d/i.     Vital Signs Last 24 Hrs  T(C): 36.6 (16 Nov 2018 05:24), Max: 36.6 (15 Nov 2018 22:25)  T(F): 97.8 (16 Nov 2018 05:24), Max: 97.8 (15 Nov 2018 22:25)  HR: 82 (16 Nov 2018 05:24) (71 - 86)  BP: 114/72 (16 Nov 2018 05:24) (114/72 - 135/89)  BP(mean): --  RR: 16 (16 Nov 2018 05:24) (16 - 20)  SpO2: 95% (16 Nov 2018 05:24) (86% - 96%)    I&O's Detail    15 Nov 2018 07:01  -  16 Nov 2018 07:00  --------------------------------------------------------  IN:    IV PiggyBack: 50 mL    Oral Fluid: 100 mL  Total IN: 150 mL    OUT:    Bulb: 10 mL  Total OUT: 10 mL    Total NET: 140 mL          Daily     Daily     MEDICATIONS  (STANDING):  clonazePAM Tablet 2 milliGRAM(s) Oral at bedtime  diVALproex  milliGRAM(s) Oral at bedtime  dronedarone 400 milliGRAM(s) Oral two times a day  enoxaparin Injectable 40 milliGRAM(s) SubCutaneous daily  metoprolol succinate  milliGRAM(s) Oral daily  risperiDONE   Tablet 1 milliGRAM(s) Oral two times a day  traZODone 150 milliGRAM(s) Oral at bedtime    MEDICATIONS  (PRN):      LABS:                        11.9   5.08  )-----------( 217      ( 16 Nov 2018 07:45 )             36.9     11-16    136  |  94<L>  |  16  ----------------------------<  92  4.1   |  31  |  0.92    Ca    8.8      16 Nov 2018 07:45  Phos  3.8     11-16  Mg     2.0     11-16    TPro  5.8<L>  /  Alb  3.2<L>  /  TBili  0.6  /  DBili  x   /  AST  16  /  ALT  6   /  AlkPhos  44  11-15          RADIOLOGY & ADDITIONAL STUDIES: GENERAL SURGERY DAILY PROGRESS NOTE:       Subjective:  Pt seen and examined at bedside. Pt vomited last night after regular diet. CXR shows small L pleural effusion and atelectasis. (-)N/V.         Objective:    PE:  Gen: resting comfortably in bed, NAD  Resp: breathing comfortably  Abd: soft, NT, moderately distended. No rebound/guarding. Incision c/d/i.     Vital Signs Last 24 Hrs  T(C): 36.6 (16 Nov 2018 05:24), Max: 36.6 (15 Nov 2018 22:25)  T(F): 97.8 (16 Nov 2018 05:24), Max: 97.8 (15 Nov 2018 22:25)  HR: 82 (16 Nov 2018 05:24) (71 - 86)  BP: 114/72 (16 Nov 2018 05:24) (114/72 - 135/89)  BP(mean): --  RR: 16 (16 Nov 2018 05:24) (16 - 20)  SpO2: 95% (16 Nov 2018 05:24) (86% - 96%)    I&O's Detail    15 Nov 2018 07:01  -  16 Nov 2018 07:00  --------------------------------------------------------  IN:    IV PiggyBack: 50 mL    Oral Fluid: 100 mL  Total IN: 150 mL    OUT:    Bulb: 10 mL  Total OUT: 10 mL    Total NET: 140 mL          Daily     Daily     MEDICATIONS  (STANDING):  clonazePAM Tablet 2 milliGRAM(s) Oral at bedtime  diVALproex  milliGRAM(s) Oral at bedtime  dronedarone 400 milliGRAM(s) Oral two times a day  enoxaparin Injectable 40 milliGRAM(s) SubCutaneous daily  metoprolol succinate  milliGRAM(s) Oral daily  risperiDONE   Tablet 1 milliGRAM(s) Oral two times a day  traZODone 150 milliGRAM(s) Oral at bedtime    MEDICATIONS  (PRN):      LABS:                        11.9   5.08  )-----------( 217      ( 16 Nov 2018 07:45 )             36.9     11-16    136  |  94<L>  |  16  ----------------------------<  92  4.1   |  31  |  0.92    Ca    8.8      16 Nov 2018 07:45  Phos  3.8     11-16  Mg     2.0     11-16    TPro  5.8<L>  /  Alb  3.2<L>  /  TBili  0.6  /  DBili  x   /  AST  16  /  ALT  6   /  AlkPhos  44  11-15          RADIOLOGY & ADDITIONAL STUDIES: GENERAL SURGERY DAILY PROGRESS NOTE:       Subjective:  Pt seen and examined at bedside. Pt vomited last night after regular diet. CXR shows small L pleural effusion and atelectasis + dilated loops of bowel. (-)N/V. (-) flatus/BM         Objective:    PE:  Gen: resting comfortably in bed, NAD  Resp: breathing comfortably  Abd: soft, NT, moderately distended. No rebound/guarding. Incision c/d/i.     Vital Signs Last 24 Hrs  T(C): 36.6 (16 Nov 2018 05:24), Max: 36.6 (15 Nov 2018 22:25)  T(F): 97.8 (16 Nov 2018 05:24), Max: 97.8 (15 Nov 2018 22:25)  HR: 82 (16 Nov 2018 05:24) (71 - 86)  BP: 114/72 (16 Nov 2018 05:24) (114/72 - 135/89)  BP(mean): --  RR: 16 (16 Nov 2018 05:24) (16 - 20)  SpO2: 95% (16 Nov 2018 05:24) (86% - 96%)    I&O's Detail    15 Nov 2018 07:01  -  16 Nov 2018 07:00  --------------------------------------------------------  IN:    IV PiggyBack: 50 mL    Oral Fluid: 100 mL  Total IN: 150 mL    OUT:    Bulb: 10 mL  Total OUT: 10 mL    Total NET: 140 mL          Daily     Daily     MEDICATIONS  (STANDING):  clonazePAM Tablet 2 milliGRAM(s) Oral at bedtime  diVALproex  milliGRAM(s) Oral at bedtime  dronedarone 400 milliGRAM(s) Oral two times a day  enoxaparin Injectable 40 milliGRAM(s) SubCutaneous daily  metoprolol succinate  milliGRAM(s) Oral daily  risperiDONE   Tablet 1 milliGRAM(s) Oral two times a day  traZODone 150 milliGRAM(s) Oral at bedtime    MEDICATIONS  (PRN):      LABS:                        11.9   5.08  )-----------( 217      ( 16 Nov 2018 07:45 )             36.9     11-16    136  |  94<L>  |  16  ----------------------------<  92  4.1   |  31  |  0.92    Ca    8.8      16 Nov 2018 07:45  Phos  3.8     11-16  Mg     2.0     11-16    TPro  5.8<L>  /  Alb  3.2<L>  /  TBili  0.6  /  DBili  x   /  AST  16  /  ALT  6   /  AlkPhos  44  11-15          RADIOLOGY & ADDITIONAL STUDIES:

## 2018-11-16 NOTE — PROGRESS NOTE ADULT - ASSESSMENT
35M s/p incisional hernia repair with mesh 11/13    - AXR   - Regular diet  - Pain control  - DVT ppx  - Dispo: home today if tolerating diet 35M s/p incisional hernia repair with mesh 11/13    - If emesis--->consider NGT  - AXR   - Regular diet  - Pain control  - DVT ppx  - Dispo: home today if tolerating diet

## 2018-11-17 LAB
BUN SERPL-MCNC: 21 MG/DL — SIGNIFICANT CHANGE UP (ref 7–23)
CALCIUM SERPL-MCNC: 8.9 MG/DL — SIGNIFICANT CHANGE UP (ref 8.4–10.5)
CHLORIDE SERPL-SCNC: 96 MMOL/L — LOW (ref 98–107)
CO2 SERPL-SCNC: 30 MMOL/L — SIGNIFICANT CHANGE UP (ref 22–31)
CREAT SERPL-MCNC: 1.14 MG/DL — SIGNIFICANT CHANGE UP (ref 0.5–1.3)
GLUCOSE SERPL-MCNC: 99 MG/DL — SIGNIFICANT CHANGE UP (ref 70–99)
HCT VFR BLD CALC: 35.8 % — LOW (ref 39–50)
HGB BLD-MCNC: 11.4 G/DL — LOW (ref 13–17)
MCHC RBC-ENTMCNC: 26.8 PG — LOW (ref 27–34)
MCHC RBC-ENTMCNC: 31.8 % — LOW (ref 32–36)
MCV RBC AUTO: 84.2 FL — SIGNIFICANT CHANGE UP (ref 80–100)
NRBC # FLD: 0 — SIGNIFICANT CHANGE UP
PLATELET # BLD AUTO: 228 K/UL — SIGNIFICANT CHANGE UP (ref 150–400)
PMV BLD: 11.7 FL — SIGNIFICANT CHANGE UP (ref 7–13)
POTASSIUM SERPL-MCNC: 4.7 MMOL/L — SIGNIFICANT CHANGE UP (ref 3.5–5.3)
POTASSIUM SERPL-SCNC: 4.7 MMOL/L — SIGNIFICANT CHANGE UP (ref 3.5–5.3)
RBC # BLD: 4.25 M/UL — SIGNIFICANT CHANGE UP (ref 4.2–5.8)
RBC # FLD: 13.9 % — SIGNIFICANT CHANGE UP (ref 10.3–14.5)
SODIUM SERPL-SCNC: 137 MMOL/L — SIGNIFICANT CHANGE UP (ref 135–145)
WBC # BLD: 2.64 K/UL — LOW (ref 3.8–10.5)
WBC # FLD AUTO: 2.64 K/UL — LOW (ref 3.8–10.5)

## 2018-11-17 PROCEDURE — 93010 ELECTROCARDIOGRAM REPORT: CPT

## 2018-11-17 PROCEDURE — 99223 1ST HOSP IP/OBS HIGH 75: CPT

## 2018-11-17 PROCEDURE — 74177 CT ABD & PELVIS W/CONTRAST: CPT | Mod: 26

## 2018-11-17 RX ORDER — METOCLOPRAMIDE HCL 10 MG
10 TABLET ORAL EVERY 6 HOURS
Qty: 0 | Refills: 0 | Status: DISCONTINUED | OUTPATIENT
Start: 2018-11-17 | End: 2018-11-17

## 2018-11-17 RX ORDER — METOPROLOL TARTRATE 50 MG
10 TABLET ORAL EVERY 6 HOURS
Qty: 0 | Refills: 0 | Status: DISCONTINUED | OUTPATIENT
Start: 2018-11-17 | End: 2018-11-17

## 2018-11-17 RX ORDER — METOPROLOL TARTRATE 50 MG
10 TABLET ORAL EVERY 6 HOURS
Qty: 0 | Refills: 0 | Status: DISCONTINUED | OUTPATIENT
Start: 2018-11-17 | End: 2018-11-18

## 2018-11-17 RX ORDER — METOPROLOL TARTRATE 50 MG
10 TABLET ORAL ONCE
Qty: 0 | Refills: 0 | Status: COMPLETED | OUTPATIENT
Start: 2018-11-17 | End: 2018-11-17

## 2018-11-17 RX ADMIN — ENOXAPARIN SODIUM 40 MILLIGRAM(S): 100 INJECTION SUBCUTANEOUS at 14:27

## 2018-11-17 RX ADMIN — DIVALPROEX SODIUM 375 MILLIGRAM(S): 500 TABLET, DELAYED RELEASE ORAL at 05:57

## 2018-11-17 RX ADMIN — Medication 150 MILLIGRAM(S): at 21:39

## 2018-11-17 RX ADMIN — DRONEDARONE 400 MILLIGRAM(S): 400 TABLET, FILM COATED ORAL at 05:57

## 2018-11-17 RX ADMIN — Medication 120 MILLIGRAM(S): at 14:26

## 2018-11-17 RX ADMIN — Medication 120 MILLIGRAM(S): at 22:38

## 2018-11-17 RX ADMIN — Medication 10 MILLIGRAM(S): at 05:57

## 2018-11-17 RX ADMIN — DIVALPROEX SODIUM 375 MILLIGRAM(S): 500 TABLET, DELAYED RELEASE ORAL at 21:37

## 2018-11-17 RX ADMIN — RISPERIDONE 1 MILLIGRAM(S): 4 TABLET ORAL at 21:38

## 2018-11-17 RX ADMIN — RISPERIDONE 1 MILLIGRAM(S): 4 TABLET ORAL at 05:57

## 2018-11-17 RX ADMIN — DRONEDARONE 400 MILLIGRAM(S): 400 TABLET, FILM COATED ORAL at 21:38

## 2018-11-17 RX ADMIN — Medication 2 MILLIGRAM(S): at 21:37

## 2018-11-17 NOTE — PROGRESS NOTE ADULT - ASSESSMENT
35M s/p incisional hernia repair with mesh 11/13 c/b post-op ileus     - NPO/IVF  - Serial abd exams  - Pain control  - DVT ppx

## 2018-11-17 NOTE — PROGRESS NOTE ADULT - SUBJECTIVE AND OBJECTIVE BOX
VSS, episodes of nausea and vomiting Had BM yesterday. Softly distended. non tender. Likely illeus. Will await CT. Dheld

## 2018-11-17 NOTE — PROGRESS NOTE ADULT - SUBJECTIVE AND OBJECTIVE BOX
GENERAL SURGERY DAILY PROGRESS NOTE:       Subjective:  Pt seen and examined at bedside. No acute events overnight. Pain controlled. NPO (-)N/V, (-) flatus/BM. Patient had 2 episodes of emesis yesterday and NGT was placed and put out 900cc. During 2nd episode of emesis, pt desat to high 80s. Placed on 2L and SpO2 improved to mid 90s. EKG showed sinus bradycardia w/ PVCs. CXR confirmed NGT placement and showed dilated loops of bowel c/w ileus.         Objective:    PE:  Gen: resting comfortably in bed, NAD  Resp: breathing comfortably  Abd: soft, NT, moderately distended. No rebound/guarding. Incision c/d/i.     Vital Signs Last 24 Hrs  T(C): 36.7 (16 Nov 2018 22:01), Max: 37 (16 Nov 2018 17:42)  T(F): 98 (16 Nov 2018 22:01), Max: 98.6 (16 Nov 2018 17:42)  HR: 79 (16 Nov 2018 22:01) (47 - 82)  BP: 122/71 (16 Nov 2018 22:01) (112/81 - 149/80)  BP(mean): --  RR: 16 (16 Nov 2018 22:01) (16 - 18)  SpO2: 93% (16 Nov 2018 22:01) (92% - 95%)    I&O's Detail    15 Nov 2018 07:01  -  16 Nov 2018 07:00  --------------------------------------------------------  IN:    IV PiggyBack: 50 mL    Oral Fluid: 100 mL  Total IN: 150 mL    OUT:    Bulb: 10 mL  Total OUT: 10 mL    Total NET: 140 mL      16 Nov 2018 07:01  -  17 Nov 2018 00:52  --------------------------------------------------------  IN:    dextrose 5% + sodium chloride 0.45% with potassium chloride 20 mEq/L: 700 mL    Oral Fluid: 120 mL  Total IN: 820 mL    OUT:    Emesis: 200 mL    Nasoenteral Tube: 900 mL  Total OUT: 1100 mL    Total NET: -280 mL          Daily     Daily     MEDICATIONS  (STANDING):  clonazePAM Tablet 2 milliGRAM(s) Oral at bedtime  dextrose 5% + sodium chloride 0.45% with potassium chloride 20 mEq/L 1000 milliLiter(s) (100 mL/Hr) IV Continuous <Continuous>  diVALproex Sprinkle 375 milliGRAM(s) Oral two times a day  dronedarone 400 milliGRAM(s) Oral <User Schedule>  enoxaparin Injectable 40 milliGRAM(s) SubCutaneous daily  metoprolol tartrate Injectable 10 milliGRAM(s) IV Push every 6 hours  risperiDONE   Tablet 1 milliGRAM(s) Oral <User Schedule>  traZODone 150 milliGRAM(s) Oral at bedtime    MEDICATIONS  (PRN):  benzocaine 15 mG/menthol 3.6 mG Lozenge 1 Lozenge Oral three times a day PRN Sore Throat      LABS:                        11.9   5.08  )-----------( 217      ( 16 Nov 2018 07:45 )             36.9     11-16    136  |  94<L>  |  16  ----------------------------<  92  4.1   |  31  |  0.92    Ca    8.8      16 Nov 2018 07:45  Phos  3.8     11-16  Mg     2.0     11-16    TPro  5.8<L>  /  Alb  3.2<L>  /  TBili  0.6  /  DBili  x   /  AST  16  /  ALT  6   /  AlkPhos  44  11-15          RADIOLOGY & ADDITIONAL STUDIES:

## 2018-11-17 NOTE — CONSULT NOTE ADULT - SUBJECTIVE AND OBJECTIVE BOX
HISTORY OF PRESENT ILLNESS:  Patient is a 33y old  Male who presents with a chief complaint of Incisional hernia repair (15 Nov 2018 12:02)    HPI:  33 y.o. male with hx of mental retardation, paroxysmal Afib, seizures (last episode >5 years ago), presents for Incisional Hernia Repair with mesh.         Allergies    No Known Allergies    Intolerances    	    MEDICATIONS:  dronedarone 400 milliGRAM(s) Oral <User Schedule>  enoxaparin Injectable 40 milliGRAM(s) SubCutaneous daily  metoprolol tartrate IVPB 10 milliGRAM(s) IV Intermittent every 6 jose cruz  clonazePAM Tablet 2 milliGRAM(s) Oral at bedtime  diVALproex Sprinkle 375 milliGRAM(s) Oral two times a day  risperiDONE   Tablet 1 milliGRAM(s) Oral <User Schedule>  traZODone 150 milliGRAM(s) Oral at bedtime  benzocaine 15 mG/menthol 3.6 mG Lozenge 1 Lozenge Oral three times a day PRN  dextrose 5% + sodium chloride 0.45% with potassium chloride 20 mEq/L 1000 milliLiter(s) IV Continuous <Continuous>      PAST MEDICAL & SURGICAL HISTORY:  Incisional hernia  Seizures  Afib  Hypertension  Autism spectrum disorder  History of incisional hernia repair: ~03/2018      FAMILY HISTORY:  Hypertension (Mother)      SOCIAL HISTORY:      Smoker: [ ] Active [ ] never  [ ] previous  Alcohol:  [ ] social [ ] daily [ ] never  Lives with:   Occupation:    REVIEW OF SYSTEMS:  CONSTITUTIONAL: No weakness, fevers or chills  EYES/ENT: No visual changes;  No vertigo or throat pain   NECK: No pain or stiffness  RESPIRATORY: No cough, wheezing, hemoptysis; No shortness of breath  CARDIOVASCULAR: No chest pain or palpitations  GASTROINTESTINAL: No abdominal or epigastric pain. No nausea, vomiting, or hematemesis; No diarrhea or constipation. No melena or hematochezia.  GENITOURINARY: No dysuria, frequency or hematuria  NEUROLOGICAL: No numbness or weakness  SKIN: No itching, burning, rashes, or lesions   All other review of systems is negative unless indicated above.    PHYSICAL EXAM:  T(C): 36.9 (11-17-18 @ 18:09), Max: 36.9 (11-17-18 @ 18:09)  HR: 97 (11-17-18 @ 18:09) (52 - 160)  BP: 126/78 (11-17-18 @ 18:09) (106/67 - 126/78)  RR: 16 (11-17-18 @ 18:09) (16 - 18)  SpO2: 94% (11-17-18 @ 18:09) (87% - 94%)  Wt(kg): --    Appearance: Normal	  HEENT:   Normal oral mucosa, PERRL, EOMI	  Lymphatic: No lymphadenopathy  Cardiovascular: Normal S1 S2, No JVD, No murmurs, No edema  Respiratory: Lungs clear to auscultation	  Psychiatry: A & O x 3, Mood & affect appropriate  Gastrointestinal:  Soft, Non-tender, + BS	  Skin: No rashes, No ecchymoses, No cyanosis	  Neurologic: Non-focal, A&Ox3, nonfocal, KIMBLE x 4  Extremities: Normal range of motion, No clubbing, cyanosis or edema  Vascular: Peripheral pulses palpable 2+ bilaterally    I&O's Summary    16 Nov 2018 07:01  -  17 Nov 2018 07:00  --------------------------------------------------------  IN: 1620 mL / OUT: 1400 mL / NET: 220 mL    17 Nov 2018 07:01  -  17 Nov 2018 22:07  --------------------------------------------------------  IN: 1250 mL / OUT: 700 mL / NET: 550 mL      	 	  LABS:	 	                          11.4   2.64  )-----------( 228      ( 17 Nov 2018 07:20 )             35.8     11-17    137  |  96<L>  |  21  ----------------------------<  99  4.7   |  30  |  1.14  11-16    136  |  94<L>  |  16  ----------------------------<  92  4.1   |  31  |  0.92    Ca    8.9      17 Nov 2018 11:45  Ca    8.8      16 Nov 2018 07:45  Phos  3.8     11-16  Mg     2.0     11-16    proBNP:   Lipid Profile:   HgA1c:   TSH:   CARDIAC MARKERS:  CAPILLARY BLOOD GLUCOSE    TELEMETRY: 	    ECG:  	  RADIOLOGY:  OTHER: 	    PREVIOUS DIAGNOSTIC TESTING:    [ ] Echocardiogram:  [ ]  Catheterization:  [ ] Stress Test: HISTORY OF PRESENT ILLNESS:  Patient is a 33y old  Male who presents with a chief complaint of Incisional hernia repair (15 Nov 2018 12:02)    HPI:  33 y.o. male with hx of mental retardation, paroxysmal Afib, seizures (last episode >5 years ago), presents for Incisional Hernia Repair with mesh. Now POD#4. NGT placed POD 2 for vomiting.  CXR clear yesterday clear. Remains NPO with NGT.   .      Allergies    No Known Allergies    Intolerances    	    MEDICATIONS:  dronedarone 400 milliGRAM(s) Oral <User Schedule>  enoxaparin Injectable 40 milliGRAM(s) SubCutaneous daily  metoprolol tartrate IVPB 10 milliGRAM(s) IV Intermittent every 6 jose cruz  clonazePAM Tablet 2 milliGRAM(s) Oral at bedtime  diVALproex Sprinkle 375 milliGRAM(s) Oral two times a day  risperiDONE   Tablet 1 milliGRAM(s) Oral <User Schedule>  traZODone 150 milliGRAM(s) Oral at bedtime  benzocaine 15 mG/menthol 3.6 mG Lozenge 1 Lozenge Oral three times a day PRN  dextrose 5% + sodium chloride 0.45% with potassium chloride 20 mEq/L 1000 milliLiter(s) IV Continuous <Continuous>      PAST MEDICAL & SURGICAL HISTORY:  Incisional hernia  Seizures  Afib  Hypertension  Autism spectrum disorder  History of incisional hernia repair: ~03/2018      FAMILY HISTORY:  Hypertension (Mother)      SOCIAL HISTORY:      Smoker: [ ] Active [ ] never  [ ] previous  Alcohol:  [ ] social [ ] daily [ ] never  Lives with:   Occupation:    REVIEW OF SYSTEMS:  CONSTITUTIONAL: No weakness, fevers or chills  EYES/ENT: No visual changes;  No vertigo or throat pain   NECK: No pain or stiffness  RESPIRATORY: No cough, wheezing, hemoptysis; No shortness of breath  CARDIOVASCULAR: No chest pain or palpitations  GASTROINTESTINAL: No abdominal or epigastric pain. No nausea, vomiting, or hematemesis; No diarrhea or constipation. No melena or hematochezia.  GENITOURINARY: No dysuria, frequency or hematuria  NEUROLOGICAL: No numbness or weakness  SKIN: No itching, burning, rashes, or lesions   All other review of systems is negative unless indicated above.    PHYSICAL EXAM:  T(C): 36.9 (11-17-18 @ 18:09), Max: 36.9 (11-17-18 @ 18:09)  HR: 97 (11-17-18 @ 18:09) (52 - 160)  BP: 126/78 (11-17-18 @ 18:09) (106/67 - 126/78)  RR: 16 (11-17-18 @ 18:09) (16 - 18)  SpO2: 94% (11-17-18 @ 18:09) (87% - 94%)  Wt(kg): --    Appearance: Normal	  HEENT:   Normal oral mucosa, PERRL, EOMI	  Lymphatic: No lymphadenopathy  Cardiovascular: Normal S1 S2, No JVD, No murmurs, No edema  Respiratory: Lungs clear to auscultation	  Psychiatry: A & O x 3, Mood & affect appropriate  Gastrointestinal:  Soft, Non-tender, + BS	  Skin: No rashes, No ecchymoses, No cyanosis	  Neurologic: Non-focal, A&Ox3, nonfocal, KIMBLE x 4  Extremities: Normal range of motion, No clubbing, cyanosis or edema  Vascular: Peripheral pulses palpable 2+ bilaterally    I&O's Summary    16 Nov 2018 07:01  -  17 Nov 2018 07:00  --------------------------------------------------------  IN: 1620 mL / OUT: 1400 mL / NET: 220 mL    17 Nov 2018 07:01  -  17 Nov 2018 22:07  --------------------------------------------------------  IN: 1250 mL / OUT: 700 mL / NET: 550 mL      	 	  LABS:	 	                          11.4   2.64  )-----------( 228      ( 17 Nov 2018 07:20 )             35.8     11-17    137  |  96<L>  |  21  ----------------------------<  99  4.7   |  30  |  1.14  11-16    136  |  94<L>  |  16  ----------------------------<  92  4.1   |  31  |  0.92    Ca    8.9      17 Nov 2018 11:45  Ca    8.8      16 Nov 2018 07:45  Phos  3.8     11-16  Mg     2.0     11-16    proBNP:   Lipid Profile:   HgA1c:   TSH:   CARDIAC MARKERS:  CAPILLARY BLOOD GLUCOSE    TELEMETRY: 	    ECG:  	  RADIOLOGY:  OTHER: 	    PREVIOUS DIAGNOSTIC TESTING:    [ ] Echocardiogram:  [ ]  Catheterization:  [ ] Stress Test: HISTORY OF PRESENT ILLNESS:  Patient is a 33y old  Male who presents with a chief complaint of Incisional hernia repair (15 Nov 2018 12:02)    HPI:  33 y.o. male with hx of mental retardation, paroxysmal Afib, seizures (last episode >5 years ago), presents for Incisional Hernia Repair with mesh. Now POD#4. NGT placed POD 2 for vomiting.  CXR clear. Remains NPO with NGT. CT abd done, results pending. Cardiology called for intermittent tachycardia. EKG: Atrial fibrillation rvr, .  .      Allergies    No Known Allergies    Intolerances    	    MEDICATIONS:  dronedarone 400 milliGRAM(s) Oral <User Schedule>  enoxaparin Injectable 40 milliGRAM(s) SubCutaneous daily  metoprolol tartrate IVPB 10 milliGRAM(s) IV Intermittent every 6 jose cruz  clonazePAM Tablet 2 milliGRAM(s) Oral at bedtime  diVALproex Sprinkle 375 milliGRAM(s) Oral two times a day  risperiDONE   Tablet 1 milliGRAM(s) Oral <User Schedule>  traZODone 150 milliGRAM(s) Oral at bedtime  benzocaine 15 mG/menthol 3.6 mG Lozenge 1 Lozenge Oral three times a day PRN  dextrose 5% + sodium chloride 0.45% with potassium chloride 20 mEq/L 1000 milliLiter(s) IV Continuous <Continuous>      PAST MEDICAL & SURGICAL HISTORY:  Incisional hernia  Seizures  Afib  Hypertension  Autism spectrum disorder  History of incisional hernia repair: ~03/2018      FAMILY HISTORY:  Hypertension (Mother)      SOCIAL HISTORY:      Smoker: [ ] Active [ ] never  [ ] previous  Alcohol:  [ ] social [ ] daily [ ] never  Lives with:   Occupation:    REVIEW OF SYSTEMS:  CONSTITUTIONAL: No weakness, fevers or chills  EYES/ENT: No visual changes;  No vertigo or throat pain   NECK: No pain or stiffness  RESPIRATORY: No cough, wheezing, hemoptysis; No shortness of breath  CARDIOVASCULAR: No chest pain or palpitations  GASTROINTESTINAL: No abdominal or epigastric pain. No nausea, vomiting, or hematemesis; No diarrhea or constipation. No melena or hematochezia.  GENITOURINARY: No dysuria, frequency or hematuria  NEUROLOGICAL: No numbness or weakness  SKIN: No itching, burning, rashes, or lesions   All other review of systems is negative unless indicated above.    PHYSICAL EXAM:  T(C): 36.9 (11-17-18 @ 18:09), Max: 36.9 (11-17-18 @ 18:09)  HR: 97 (11-17-18 @ 18:09) (52 - 160)  BP: 126/78 (11-17-18 @ 18:09) (106/67 - 126/78)  RR: 16 (11-17-18 @ 18:09) (16 - 18)  SpO2: 94% (11-17-18 @ 18:09) (87% - 94%)  Wt(kg): --    Appearance: Normal	  HEENT:   Normal oral mucosa, PERRL, EOMI	  Lymphatic: No lymphadenopathy  Cardiovascular: Normal S1 S2, No JVD, No murmurs, No edema  Respiratory: Lungs clear to auscultation	  Psychiatry: A & O x 3, Mood & affect appropriate  Gastrointestinal:  Soft, Non-tender, + BS	  Skin: No rashes, No ecchymoses, No cyanosis	  Neurologic: Non-focal, A&Ox3, nonfocal, KIMBLE x 4  Extremities: Normal range of motion, No clubbing, cyanosis or edema  Vascular: Peripheral pulses palpable 2+ bilaterally    I&O's Summary    16 Nov 2018 07:01  -  17 Nov 2018 07:00  --------------------------------------------------------  IN: 1620 mL / OUT: 1400 mL / NET: 220 mL    17 Nov 2018 07:01  -  17 Nov 2018 22:07  --------------------------------------------------------  IN: 1250 mL / OUT: 700 mL / NET: 550 mL      	 	  LABS:	 	                          11.4   2.64  )-----------( 228      ( 17 Nov 2018 07:20 )             35.8     11-17    137  |  96<L>  |  21  ----------------------------<  99  4.7   |  30  |  1.14  11-16    136  |  94<L>  |  16  ----------------------------<  92  4.1   |  31  |  0.92    Ca    8.9      17 Nov 2018 11:45  Ca    8.8      16 Nov 2018 07:45  Phos  3.8     11-16  Mg     2.0     11-16    proBNP:   Lipid Profile:   HgA1c:   TSH:   CARDIAC MARKERS:  CAPILLARY BLOOD GLUCOSE    TELEMETRY: 	    ECG:  	  RADIOLOGY:  OTHER: 	    PREVIOUS DIAGNOSTIC TESTING:    [ ] Echocardiogram:  [ ]  Catheterization:  [ ] Stress Test:  	  	  Assessment  33 y.o. male with hx of mental retardation, paroxysmal Afib, seizures (last episode >5 years ago), presents for Incisional Hernia Repair with mesh. Now POD#4. NGT placed POD 2 for vomiting.  CXR clear. Remains NPO with NGT. CT abd done, results pending. Cardiology called for intermittent tachycardia. EKG: Atrial fibrillation rvr, .    Afib rvr  Atrial fibrillation with RVR   CHADSVASC score:  0  Has Bled score: 0  monitor on telemetry to better evaluate rate control  Continue Lovenox at prophylactic dose   Metoprolol 50 mg tablet BID Hold for b/p < 100, HR < 60  labs to include TSH,  pBNP  EKG, PRN chest pain and daily  Echo in AM to R/O thrombus and evaluate LV function and wall motion abnormality HISTORY OF PRESENT ILLNESS:  Patient is a 33y old  Male who presents with a chief complaint of Incisional hernia repair   HPI:  33 y.o. male with hx of intellectual disabilities/Autism, paroxysmal Afib, seizures (last episode >5 years ago), presents for Incisional Hernia Repair with mesh. Now POD#4. NGT placed POD 2 for vomiting.  CXR clear. Remains NPO with NGT. CT abd done, results pending. Cardiology called for intermittent tachycardia. EKG: Atrial fibrillation rvr, .    Allergies    No Known Allergies    MEDICATIONS:  dronedarone 400 milliGRAM(s) Oral <User Schedule>  enoxaparin Injectable 40 milliGRAM(s) SubCutaneous daily  metoprolol tartrate IVPB 10 milliGRAM(s) IV Intermittent every 6 jose curz  clonazePAM Tablet 2 milliGRAM(s) Oral at bedtime  diVALproex Sprinkle 375 milliGRAM(s) Oral two times a day  risperiDONE   Tablet 1 milliGRAM(s) Oral <User Schedule>  traZODone 150 milliGRAM(s) Oral at bedtime  benzocaine 15 mG/menthol 3.6 mG Lozenge 1 Lozenge Oral three times a day PRN  dextrose 5% + sodium chloride 0.45% with potassium chloride 20 mEq/L 1000 milliLiter(s) IV Continuous <Continuous>      PAST MEDICAL & SURGICAL HISTORY:  Incisional hernia  Seizures  Afib  Hypertension  Autism spectrum disorder  History of incisional hernia repair: ~03/2018      FAMILY HISTORY:  Hypertension (Mother)      SOCIAL HISTORY:      Smoker: [ ] Active [x ] never  [ ] previous  Alcohol:  [ ] social [ ] daily [ x] never  Lives with: sister      REVIEW OF SYSTEMS:  Unable to obtain    PHYSICAL EXAM:  T(C): 36.9 (11-17-18 @ 18:09), Max: 36.9 (11-17-18 @ 18:09)  HR: 97 (11-17-18 @ 18:09) (52 - 160)  BP: 126/78 (11-17-18 @ 18:09) (106/67 - 126/78)  RR: 16 (11-17-18 @ 18:09) (16 - 18)  SpO2: 94% (11-17-18 @ 18:09) (87% - 94%)  Wt(kg): --    Appearance: Normal	  HEENT:   Normal oral mucosa, PERRL, EOMI	  Lymphatic: No lymphadenopathy  Cardiovascular: Normal S1 S2, No JVD, No murmurs, No edema  Respiratory: Lungs clear to auscultation	  Psychiatry: A & O x 3, Mood & affect appropriate  Gastrointestinal:  Soft, Non-tender, + BS	  Skin: No rashes, No ecchymoses, No cyanosis	  Neurologic: Non-focal, A&Ox3, nonfocal, KIMBLE x 4  Extremities: Normal range of motion, No clubbing, cyanosis or edema  Vascular: Peripheral pulses palpable 2+ bilaterally    I&O's Summary    16 Nov 2018 07:01  -  17 Nov 2018 07:00  --------------------------------------------------------  IN: 1620 mL / OUT: 1400 mL / NET: 220 mL    17 Nov 2018 07:01  -  17 Nov 2018 22:07  --------------------------------------------------------  IN: 1250 mL / OUT: 700 mL / NET: 550 mL      	 	  LABS:	 	                          11.4   2.64  )-----------( 228      ( 17 Nov 2018 07:20 )             35.8     11-17    137  |  96<L>  |  21  ----------------------------<  99  4.7   |  30  |  1.14  11-16    136  |  94<L>  |  16  ----------------------------<  92  4.1   |  31  |  0.92    Ca    8.9      17 Nov 2018 11:45  Ca    8.8      16 Nov 2018 07:45  Phos  3.8     11-16  Mg     2.0     11-16    proBNP:   Lipid Profile:   HgA1c:   TSH:   CARDIAC MARKERS:  CAPILLARY BLOOD GLUCOSE    	    ECG:  Atrial Fibrillation 	  RADIOLOGY: The lungs are clear. The cardiac silhouette remains enlarged.    OTHER: 	    PREVIOUS DIAGNOSTIC TESTING:    [ ] Echocardiogram:< from: Transthoracic Echocardiogram (02.04.18 @ 09:54) >  < from: Transthoracic Echocardiogram (02.04.18 @ 09:54) >  EF (Visual Estimate): 50-55 %  Conclusions:  Technically difficult study.  1. Normal mitral valve. Mild mitral regurgitation.  2. Normal trileaflet aortic valve. No aortic valve  regurgitation seen.  3. Endocardium not well visualized; grossly low normal left  ventricular systolic function. Patient in atrial  fibrillation with rapid ventricular rate; ejection fraction  varies with R-R interval.  4. Right ventricle assessment limited by atrial  fibrillation with rapid ventricular response and poor  visualization of the RV; grossly the right ventircle  appears normal size with midlly decreased systolic  function.  5. Normal tricuspid valve.   Moderate tricuspid  regurgitation.  6. Small pericardial effusion.    < end of copied text >    Assessment  33 y.o. male with hx of intellectual disabilities/Autism, paroxysmal Afib, seizures (last episode >5 years ago), presents for Incisional Hernia Repair with mesh. Now POD#4. NGT placed POD 2 for vomiting.  CXR clear. Remains NPO with NGT. CT abd done, results pending. Cardiology called for intermittent tachycardia. EKG: Atrial fibrillation rvr, .    Problem/Plan  Afib rvr  Pt with h/o pAfib and Afib rvr. receiving metoprolol 10mg IVPB q 6 being held for SBP <110.  CHADSVASC score:  0  Has Bled score: 0  Continue Lovenox at prophylactic dose   monitor on telemetry to better evaluate rate control  Metoprolol 50 mg tablet BID Hold for b/p < 100, HR < 60 (unless pt cannot tolerated NGT clamped for 1 hour - then continue IVPB)  labs to include TSH,  pBNP  EKG, PRN chest pain and daily  Echo in AM to R/O thrombus and evaluate LV function and wall motion abnormality

## 2018-11-18 LAB
BUN SERPL-MCNC: 15 MG/DL — SIGNIFICANT CHANGE UP (ref 7–23)
CALCIUM SERPL-MCNC: 8.4 MG/DL — SIGNIFICANT CHANGE UP (ref 8.4–10.5)
CHLORIDE SERPL-SCNC: 96 MMOL/L — LOW (ref 98–107)
CO2 SERPL-SCNC: 28 MMOL/L — SIGNIFICANT CHANGE UP (ref 22–31)
CREAT SERPL-MCNC: 0.95 MG/DL — SIGNIFICANT CHANGE UP (ref 0.5–1.3)
GLUCOSE SERPL-MCNC: 103 MG/DL — HIGH (ref 70–99)
HCT VFR BLD CALC: 37 % — LOW (ref 39–50)
HGB BLD-MCNC: 11.9 G/DL — LOW (ref 13–17)
MAGNESIUM SERPL-MCNC: 1.8 MG/DL — SIGNIFICANT CHANGE UP (ref 1.6–2.6)
MCHC RBC-ENTMCNC: 26.9 PG — LOW (ref 27–34)
MCHC RBC-ENTMCNC: 32.2 % — SIGNIFICANT CHANGE UP (ref 32–36)
MCV RBC AUTO: 83.5 FL — SIGNIFICANT CHANGE UP (ref 80–100)
NRBC # FLD: 0 — SIGNIFICANT CHANGE UP
PHOSPHATE SERPL-MCNC: 3 MG/DL — SIGNIFICANT CHANGE UP (ref 2.5–4.5)
PLATELET # BLD AUTO: 251 K/UL — SIGNIFICANT CHANGE UP (ref 150–400)
PMV BLD: 11 FL — SIGNIFICANT CHANGE UP (ref 7–13)
POTASSIUM SERPL-MCNC: 4 MMOL/L — SIGNIFICANT CHANGE UP (ref 3.5–5.3)
POTASSIUM SERPL-SCNC: 4 MMOL/L — SIGNIFICANT CHANGE UP (ref 3.5–5.3)
RBC # BLD: 4.43 M/UL — SIGNIFICANT CHANGE UP (ref 4.2–5.8)
RBC # FLD: 13.7 % — SIGNIFICANT CHANGE UP (ref 10.3–14.5)
SODIUM SERPL-SCNC: 136 MMOL/L — SIGNIFICANT CHANGE UP (ref 135–145)
WBC # BLD: 4.82 K/UL — SIGNIFICANT CHANGE UP (ref 3.8–10.5)
WBC # FLD AUTO: 4.82 K/UL — SIGNIFICANT CHANGE UP (ref 3.8–10.5)

## 2018-11-18 RX ORDER — METOPROLOL TARTRATE 50 MG
10 TABLET ORAL EVERY 6 HOURS
Qty: 0 | Refills: 0 | Status: DISCONTINUED | OUTPATIENT
Start: 2018-11-18 | End: 2018-11-18

## 2018-11-18 RX ORDER — METOPROLOL TARTRATE 50 MG
50 TABLET ORAL
Qty: 0 | Refills: 0 | Status: DISCONTINUED | OUTPATIENT
Start: 2018-11-18 | End: 2018-11-18

## 2018-11-18 RX ORDER — RISPERIDONE 4 MG/1
1 TABLET ORAL
Qty: 0 | Refills: 0 | Status: DISCONTINUED | OUTPATIENT
Start: 2018-11-18 | End: 2018-11-21

## 2018-11-18 RX ORDER — LANOLIN ALCOHOL/MO/W.PET/CERES
10 CREAM (GRAM) TOPICAL AT BEDTIME
Qty: 0 | Refills: 0 | Status: DISCONTINUED | OUTPATIENT
Start: 2018-11-18 | End: 2018-11-18

## 2018-11-18 RX ORDER — LANOLIN ALCOHOL/MO/W.PET/CERES
9 CREAM (GRAM) TOPICAL AT BEDTIME
Qty: 0 | Refills: 0 | Status: DISCONTINUED | OUTPATIENT
Start: 2018-11-18 | End: 2018-11-21

## 2018-11-18 RX ORDER — METOPROLOL TARTRATE 50 MG
100 TABLET ORAL DAILY
Qty: 0 | Refills: 0 | Status: DISCONTINUED | OUTPATIENT
Start: 2018-11-18 | End: 2018-11-18

## 2018-11-18 RX ORDER — MAGNESIUM SULFATE 500 MG/ML
2 VIAL (ML) INJECTION ONCE
Qty: 0 | Refills: 0 | Status: COMPLETED | OUTPATIENT
Start: 2018-11-18 | End: 2018-11-18

## 2018-11-18 RX ORDER — DRONEDARONE 400 MG/1
400 TABLET, FILM COATED ORAL
Qty: 0 | Refills: 0 | Status: DISCONTINUED | OUTPATIENT
Start: 2018-11-18 | End: 2018-11-21

## 2018-11-18 RX ORDER — CLONAZEPAM 1 MG
2 TABLET ORAL AT BEDTIME
Qty: 0 | Refills: 0 | Status: DISCONTINUED | OUTPATIENT
Start: 2018-11-18 | End: 2018-11-19

## 2018-11-18 RX ORDER — METOPROLOL TARTRATE 50 MG
50 TABLET ORAL THREE TIMES A DAY
Qty: 0 | Refills: 0 | Status: DISCONTINUED | OUTPATIENT
Start: 2018-11-18 | End: 2018-11-21

## 2018-11-18 RX ADMIN — DIVALPROEX SODIUM 375 MILLIGRAM(S): 500 TABLET, DELAYED RELEASE ORAL at 05:58

## 2018-11-18 RX ADMIN — Medication 150 MILLIGRAM(S): at 21:07

## 2018-11-18 RX ADMIN — DEXTROSE MONOHYDRATE, SODIUM CHLORIDE, AND POTASSIUM CHLORIDE 100 MILLILITER(S): 50; .745; 4.5 INJECTION, SOLUTION INTRAVENOUS at 11:03

## 2018-11-18 RX ADMIN — Medication 120 MILLIGRAM(S): at 05:59

## 2018-11-18 RX ADMIN — RISPERIDONE 1 MILLIGRAM(S): 4 TABLET ORAL at 21:07

## 2018-11-18 RX ADMIN — DRONEDARONE 400 MILLIGRAM(S): 400 TABLET, FILM COATED ORAL at 05:58

## 2018-11-18 RX ADMIN — Medication 50 GRAM(S): at 11:04

## 2018-11-18 RX ADMIN — Medication 100 MILLIGRAM(S): at 12:46

## 2018-11-18 RX ADMIN — DRONEDARONE 400 MILLIGRAM(S): 400 TABLET, FILM COATED ORAL at 21:07

## 2018-11-18 RX ADMIN — ENOXAPARIN SODIUM 40 MILLIGRAM(S): 100 INJECTION SUBCUTANEOUS at 11:05

## 2018-11-18 RX ADMIN — RISPERIDONE 1 MILLIGRAM(S): 4 TABLET ORAL at 05:58

## 2018-11-18 RX ADMIN — Medication 2 MILLIGRAM(S): at 18:19

## 2018-11-18 RX ADMIN — Medication 9 MILLIGRAM(S): at 18:18

## 2018-11-18 NOTE — PROGRESS NOTE ADULT - SUBJECTIVE AND OBJECTIVE BOX
Date of Admission:  11/13/18  24 hour events:  AFIB with RVR  Vital Signs Last 24 Hrs  T(C): 37.7 (18 Nov 2018 14:05), Max: 37.7 (18 Nov 2018 14:05)  T(F): 99.8 (18 Nov 2018 14:05), Max: 99.8 (18 Nov 2018 14:05)  HR: 132 (18 Nov 2018 14:05) (83 - 132)  BP: 120/93 (18 Nov 2018 14:05) (104/74 - 134/89)  BP(mean): --  RR: 18 (18 Nov 2018 14:05) (16 - 18)  SpO2: 96% (18 Nov 2018 14:05) (93% - 98%)  I&O's Summary    17 Nov 2018 07:01  -  18 Nov 2018 07:00  --------------------------------------------------------  IN: 2300 mL / OUT: 1100 mL / NET: 1200 mL        MEDICATIONS:  dronedarone 400 milliGRAM(s) Oral two times a day  enoxaparin Injectable 40 milliGRAM(s) SubCutaneous daily  metoprolol tartrate 50 milliGRAM(s) Oral two times a day  clonazePAM Tablet 2 milliGRAM(s) Oral at bedtime  clonazePAM Tablet 2 milliGRAM(s) Oral at bedtime  melatonin 10 milliGRAM(s) Oral at bedtime PRN  risperiDONE   Tablet 1 milliGRAM(s) Oral two times a day  traZODone 150 milliGRAM(s) Oral at bedtime  benzocaine 15 mG/menthol 3.6 mG Lozenge 1 Lozenge Oral three times a day PRN  dextrose 5% + sodium chloride 0.45% with potassium chloride 20 mEq/L 1000 milliLiter(s) IV Continuous <Continuous>    REVIEW OF SYSTEMS:    CONSTITUTIONAL: complaints of anxious and agitated   EYES/ENT: No visual changes;  No vertigo or throat pain   NECK: No pain or stiffness  RESPIRATORY: No cough, wheezing, hemoptysis; No shortness of breath  CARDIOVASCULAR: No chest pain or palpitations  GASTROINTESTINAL: No abdominal or epigastric pain. No nausea, vomiting, or hematemesis; No diarrhea or constipation. No melena or hematochezia.  GENITOURINARY: No dysuria, frequency or hematuria  NEUROLOGICAL: No numbness or weakness  SKIN: No itching, rashes    PHYSICAL EXAM:  General: NAD  Cardiovascular: Normal S1 S2, No JVD, No murmurs, No edema  Respiratory: Lungs clear to auscultation	  Gastrointestinal:  Soft, Non-tender, + BS	  Skin: warm and dry, No rashes, No ecchymoses, No cyanosis	  Extremities: Normal range of motion, No clubbing, cyanosis or edema  Vascular: Peripheral pulses palpable 2+ bilaterally    LABS:	 	    CBC Full  -  ( 18 Nov 2018 06:35 )  WBC Count : 4.82 K/uL  Hemoglobin : 11.9 g/dL  Hematocrit : 37.0 %  Platelet Count - Automated : 251 K/uL  Mean Cell Volume : 83.5 fL  Mean Cell Hemoglobin : 26.9 pg  Mean Cell Hemoglobin Concentration : 32.2 %  Auto Neutrophil # : x  Auto Lymphocyte # : x  Auto Monocyte # : x  Auto Eosinophil # : x  Auto Basophil # : x  Auto Neutrophil % : x  Auto Lymphocyte % : x  Auto Monocyte % : x  Auto Eosinophil % : x  Auto Basophil % : x    11-18    136  |  96<L>  |  15  ----------------------------<  103<H>  4.0   |  28  |  0.95  11-17    137  |  96<L>  |  21  ----------------------------<  99  4.7   |  30  |  1.14    Ca    8.4      18 Nov 2018 06:35  Ca    8.9      17 Nov 2018 11:45  Phos  3.0     11-18  Mg     1.8     11-18        proBNP:   Lipid Profile:   HgA1c:   TSH:       CARDIAC MARKERS:            TELEMETRY: 	    ECG:  	  RADIOLOGY:  ECHO:  OTHER: 	    PREVIOUS DIAGNOSTIC TESTING:    [ ] Echocardiogram:  [ ]  Catheterization:  [ ] Stress Test: Date of Admission:  11/13/18  24 hour events:  AFIB with RVR, not rate-controlled  Vital Signs Last 24 Hrs  T(C): 37.7 (18 Nov 2018 14:05), Max: 37.7 (18 Nov 2018 14:05)  T(F): 99.8 (18 Nov 2018 14:05), Max: 99.8 (18 Nov 2018 14:05)  HR: 132 (18 Nov 2018 14:05) (83 - 132)  BP: 120/93 (18 Nov 2018 14:05) (104/74 - 134/89)  BP(mean): --  RR: 18 (18 Nov 2018 14:05) (16 - 18)  SpO2: 96% (18 Nov 2018 14:05) (93% - 98%)  I&O's Summary    17 Nov 2018 07:01  -  18 Nov 2018 07:00  --------------------------------------------------------  IN: 2300 mL / OUT: 1100 mL / NET: 1200 mL        MEDICATIONS:  dronedarone 400 milliGRAM(s) Oral two times a day  enoxaparin Injectable 40 milliGRAM(s) SubCutaneous daily  metoprolol tartrate 50 milliGRAM(s) Oral two times a day  clonazePAM Tablet 2 milliGRAM(s) Oral at bedtime  clonazePAM Tablet 2 milliGRAM(s) Oral at bedtime  melatonin 10 milliGRAM(s) Oral at bedtime PRN  risperiDONE   Tablet 1 milliGRAM(s) Oral two times a day  traZODone 150 milliGRAM(s) Oral at bedtime  benzocaine 15 mG/menthol 3.6 mG Lozenge 1 Lozenge Oral three times a day PRN  dextrose 5% + sodium chloride 0.45% with potassium chloride 20 mEq/L 1000 milliLiter(s) IV Continuous <Continuous>    REVIEW OF SYSTEMS:    CONSTITUTIONAL: complaints of anxious and agitated   EYES/ENT: No visual changes;  No vertigo or throat pain   NECK: No pain or stiffness  RESPIRATORY: No cough, wheezing, hemoptysis; No shortness of breath  CARDIOVASCULAR: No chest pain or palpitations  GASTROINTESTINAL: No abdominal or epigastric pain. No nausea, vomiting, or hematemesis; No diarrhea or constipation. No melena or hematochezia.  GENITOURINARY: No dysuria, frequency or hematuria  NEUROLOGICAL: No numbness or weakness  SKIN: No itching, rashes    PHYSICAL EXAM:  Patient agitated and would not allow me to perform an exam  LABS:	 	    CBC Full  -  ( 18 Nov 2018 06:35 )  WBC Count : 4.82 K/uL  Hemoglobin : 11.9 g/dL  Hematocrit : 37.0 %  Platelet Count - Automated : 251 K/uL  Mean Cell Volume : 83.5 fL  Mean Cell Hemoglobin : 26.9 pg  Mean Cell Hemoglobin Concentration : 32.2 %  Auto Neutrophil # : x  Auto Lymphocyte # : x  Auto Monocyte # : x  Auto Eosinophil # : x  Auto Basophil # : x  Auto Neutrophil % : x  Auto Lymphocyte % : x  Auto Monocyte % : x  Auto Eosinophil % : x  Auto Basophil % : x    11-18    136  |  96<L>  |  15  ----------------------------<  103<H>  4.0   |  28  |  0.95  11-17    137  |  96<L>  |  21  ----------------------------<  99  4.7   |  30  |  1.14    Ca    8.4      18 Nov 2018 06:35  Ca    8.9      17 Nov 2018 11:45  Phos  3.0     11-18  Mg     1.8     11-18    TELEMETRY: 	  AFIB with RVR  ECG:  	AF

## 2018-11-18 NOTE — PROGRESS NOTE ADULT - SUBJECTIVE AND OBJECTIVE BOX
GENERAL SURGERY DAILY PROGRESS NOTE:       Subjective:  Pt seen and examined at bedside. No acute events overnight. Pain controlled. NPO (-)N/V, (-) flatus/BM. pt had a CT abd/pelvis with PO/ IV contrast     Objective:    PE:  Gen: resting comfortably in bed, NAD  Resp: breathing comfortably  Abd: soft, NT, moderately distended. No rebound/guarding. Incision c/d/i.       Vital Signs Last 24 Hrs  T(C): 36.4 (18 Nov 2018 01:20), Max: 36.9 (17 Nov 2018 18:09)  T(F): 97.6 (18 Nov 2018 01:20), Max: 98.4 (17 Nov 2018 18:09)  HR: 108 (18 Nov 2018 01:20) (60 - 160)  BP: 106/65 (18 Nov 2018 01:20) (104/74 - 126/78)  BP(mean): --  RR: 18 (18 Nov 2018 01:20) (16 - 18)  SpO2: 95% (18 Nov 2018 01:20) (87% - 95%)    I&O's Detail    16 Nov 2018 07:01  -  17 Nov 2018 07:00  --------------------------------------------------------  IN:    dextrose 5% + sodium chloride 0.45% with potassium chloride 20 mEq/L: 1500 mL    Oral Fluid: 120 mL  Total IN: 1620 mL    OUT:    Emesis: 200 mL    Nasoenteral Tube: 1200 mL  Total OUT: 1400 mL    Total NET: 220 mL      17 Nov 2018 07:01  -  18 Nov 2018 05:03  --------------------------------------------------------  IN:    dextrose 5% + sodium chloride 0.45% with potassium chloride 20 mEq/L: 1800 mL    IV PiggyBack: 100 mL  Total IN: 1900 mL    OUT:    Nasoenteral Tube: 1000 mL  Total OUT: 1000 mL    Total NET: 900 mL          MEDICATIONS  (STANDING):  clonazePAM Tablet 2 milliGRAM(s) Oral at bedtime  dextrose 5% + sodium chloride 0.45% with potassium chloride 20 mEq/L 1000 milliLiter(s) (100 mL/Hr) IV Continuous <Continuous>  diVALproex Sprinkle 375 milliGRAM(s) Oral two times a day  dronedarone 400 milliGRAM(s) Oral <User Schedule>  enoxaparin Injectable 40 milliGRAM(s) SubCutaneous daily  metoprolol tartrate IVPB 10 milliGRAM(s) IV Intermittent every 6 hours  risperiDONE   Tablet 1 milliGRAM(s) Oral <User Schedule>  traZODone 150 milliGRAM(s) Oral at bedtime    MEDICATIONS  (PRN):  benzocaine 15 mG/menthol 3.6 mG Lozenge 1 Lozenge Oral three times a day PRN Sore Throat      LABS:                        11.4   2.64  )-----------( 228      ( 17 Nov 2018 07:20 )             35.8     11-17    137  |  96<L>  |  21  ----------------------------<  99  4.7   |  30  |  1.14    Ca    8.9      17 Nov 2018 11:45  Phos  3.8     11-16  Mg     2.0     11-16 GENERAL SURGERY DAILY PROGRESS NOTE:       Subjective:  Pt seen and examined at bedside. No acute events overnight. Pain controlled. NPO (-)N/V, (+) BM. pt had a CT abd/pelvis with PO/ IV contrast showing Fluid-filled and dilated loops of small and large bowel with no clear transition point suggesting ileus.    Objective:    PE:  Gen: resting comfortably in bed, NAD  Resp: breathing comfortably  Abd: soft, NT, moderately distended. No rebound/guarding. Incision c/d/i.       Vital Signs Last 24 Hrs  T(C): 36.4 (18 Nov 2018 01:20), Max: 36.9 (17 Nov 2018 18:09)  T(F): 97.6 (18 Nov 2018 01:20), Max: 98.4 (17 Nov 2018 18:09)  HR: 108 (18 Nov 2018 01:20) (60 - 160)  BP: 106/65 (18 Nov 2018 01:20) (104/74 - 126/78)  BP(mean): --  RR: 18 (18 Nov 2018 01:20) (16 - 18)  SpO2: 95% (18 Nov 2018 01:20) (87% - 95%)    I&O's Detail    16 Nov 2018 07:01  -  17 Nov 2018 07:00  --------------------------------------------------------  IN:    dextrose 5% + sodium chloride 0.45% with potassium chloride 20 mEq/L: 1500 mL    Oral Fluid: 120 mL  Total IN: 1620 mL    OUT:    Emesis: 200 mL    Nasoenteral Tube: 1200 mL  Total OUT: 1400 mL    Total NET: 220 mL      17 Nov 2018 07:01  -  18 Nov 2018 05:03  --------------------------------------------------------  IN:    dextrose 5% + sodium chloride 0.45% with potassium chloride 20 mEq/L: 1800 mL    IV PiggyBack: 100 mL  Total IN: 1900 mL    OUT:    Nasoenteral Tube: 1000 mL  Total OUT: 1000 mL    Total NET: 900 mL          MEDICATIONS  (STANDING):  clonazePAM Tablet 2 milliGRAM(s) Oral at bedtime  dextrose 5% + sodium chloride 0.45% with potassium chloride 20 mEq/L 1000 milliLiter(s) (100 mL/Hr) IV Continuous <Continuous>  diVALproex Sprinkle 375 milliGRAM(s) Oral two times a day  dronedarone 400 milliGRAM(s) Oral <User Schedule>  enoxaparin Injectable 40 milliGRAM(s) SubCutaneous daily  metoprolol tartrate IVPB 10 milliGRAM(s) IV Intermittent every 6 hours  risperiDONE   Tablet 1 milliGRAM(s) Oral <User Schedule>  traZODone 150 milliGRAM(s) Oral at bedtime    MEDICATIONS  (PRN):  benzocaine 15 mG/menthol 3.6 mG Lozenge 1 Lozenge Oral three times a day PRN Sore Throat      LABS:                        11.4   2.64  )-----------( 228      ( 17 Nov 2018 07:20 )             35.8     11-17    137  |  96<L>  |  21  ----------------------------<  99  4.7   |  30  |  1.14    Ca    8.9      17 Nov 2018 11:45  Phos  3.8     11-16  Mg     2.0     11-16 GENERAL SURGERY DAILY PROGRESS NOTE:       Subjective:  Pt seen and examined at bedside. No acute events overnight. Pain controlled. NPO (-)N/V, (+) BM. pt had a CT abd/pelvis with PO/ IV contrast showing Fluid-filled and dilated loops of small and large bowel with no clear transition point suggesting ileus. Patient removed his NGT this morning.     Objective:    PE:  Gen: resting comfortably in bed, NAD  Resp: breathing comfortably  Abd: soft, NT, moderately distended. No rebound/guarding. Incision c/d/i.       Vital Signs Last 24 Hrs  T(C): 36.4 (18 Nov 2018 01:20), Max: 36.9 (17 Nov 2018 18:09)  T(F): 97.6 (18 Nov 2018 01:20), Max: 98.4 (17 Nov 2018 18:09)  HR: 108 (18 Nov 2018 01:20) (60 - 160)  BP: 106/65 (18 Nov 2018 01:20) (104/74 - 126/78)  BP(mean): --  RR: 18 (18 Nov 2018 01:20) (16 - 18)  SpO2: 95% (18 Nov 2018 01:20) (87% - 95%)    I&O's Detail    16 Nov 2018 07:01  -  17 Nov 2018 07:00  --------------------------------------------------------  IN:    dextrose 5% + sodium chloride 0.45% with potassium chloride 20 mEq/L: 1500 mL    Oral Fluid: 120 mL  Total IN: 1620 mL    OUT:    Emesis: 200 mL    Nasoenteral Tube: 1200 mL  Total OUT: 1400 mL    Total NET: 220 mL      17 Nov 2018 07:01  -  18 Nov 2018 05:03  --------------------------------------------------------  IN:    dextrose 5% + sodium chloride 0.45% with potassium chloride 20 mEq/L: 1800 mL    IV PiggyBack: 100 mL  Total IN: 1900 mL    OUT:    Nasoenteral Tube: 1000 mL  Total OUT: 1000 mL    Total NET: 900 mL          MEDICATIONS  (STANDING):  clonazePAM Tablet 2 milliGRAM(s) Oral at bedtime  dextrose 5% + sodium chloride 0.45% with potassium chloride 20 mEq/L 1000 milliLiter(s) (100 mL/Hr) IV Continuous <Continuous>  diVALproex Sprinkle 375 milliGRAM(s) Oral two times a day  dronedarone 400 milliGRAM(s) Oral <User Schedule>  enoxaparin Injectable 40 milliGRAM(s) SubCutaneous daily  metoprolol tartrate IVPB 10 milliGRAM(s) IV Intermittent every 6 hours  risperiDONE   Tablet 1 milliGRAM(s) Oral <User Schedule>  traZODone 150 milliGRAM(s) Oral at bedtime    MEDICATIONS  (PRN):  benzocaine 15 mG/menthol 3.6 mG Lozenge 1 Lozenge Oral three times a day PRN Sore Throat      LABS:                        11.4   2.64  )-----------( 228      ( 17 Nov 2018 07:20 )             35.8     11-17    137  |  96<L>  |  21  ----------------------------<  99  4.7   |  30  |  1.14    Ca    8.9      17 Nov 2018 11:45  Phos  3.8     11-16  Mg     2.0     11-16

## 2018-11-18 NOTE — PROGRESS NOTE ADULT - ASSESSMENT
ssessment  33 y.o. male with hx of intellectual disabilities/Autism, paroxysmal Afib, seizures (last episode >5 years ago), presents for Incisional Hernia Repair with mesh. Course complicated by atrial fibrillation with RVR  Problem/Plan  Afib rvr  CHADSVASC is 0, Has Bled score: 0  For better rate control would assess the agitation and anxious behavior, may need mood stabilization with his home regimen of clonipin. Would continue lopressor 50mg po bid, patient has not been consistently getting his PO beta blocker. He pulls his NGT out and gets IV, which is fast-acting and short-lived. Would recommend controlling his agitated state. Family reports he has not slept in 2 days and is becoming belligerent. Likely, the agitation and lack of sleep is contributing to the uncontrolled AFIB.  -can increase lopressor to 50mg po tid, and give lopressor 5mg IVP PRN for breakthrough, if rates are persistently greater than 150.   -Patient is maintaining a heart rate 110-130, and goes up to 170 for brief periods, non-sustained. His medication has been interrupted due to pulling out the NGT and transitioning from IV to PO. At this time, he needs better rate control with oral medication.   -continue multaq 400 bid and lopressor 50 tid for now. Use IV lopressor if needed.

## 2018-11-18 NOTE — PROGRESS NOTE ADULT - ASSESSMENT
35M s/p incisional hernia repair with mesh 11/13 c/b post-op ileus     - FU official CT read   - FU with cardiology for arrythmia   - NPO/IVF  - Serial abd exams  - Pain control  - DVT ppx 35M s/p incisional hernia repair with mesh 11/13 c/b post-op ileus     - Cardiology recs--->telemetry, TSH/p-BNP labs, TTE to r/o LV thrombus, PO metoprolol, EKG PRN chest pain  - NPO/IVF  - Monitor GI fxn   - Pain control  - DVT ppx 35M s/p incisional hernia repair with mesh 11/13 c/b post-op ileus     - Cardiology recs--->telemetry, TSH/p-BNP labs, TTE to r/o LV thrombus, PO metoprolol, EKG PRN chest pain  - Home meds   - NPO/IVF  - Monitor GI fxn   - Pain control  - DVT ppx

## 2018-11-19 LAB
BUN SERPL-MCNC: 17 MG/DL — SIGNIFICANT CHANGE UP (ref 7–23)
CALCIUM SERPL-MCNC: 8.6 MG/DL — SIGNIFICANT CHANGE UP (ref 8.4–10.5)
CHLORIDE SERPL-SCNC: 100 MMOL/L — SIGNIFICANT CHANGE UP (ref 98–107)
CO2 SERPL-SCNC: 27 MMOL/L — SIGNIFICANT CHANGE UP (ref 22–31)
CREAT SERPL-MCNC: 1.15 MG/DL — SIGNIFICANT CHANGE UP (ref 0.5–1.3)
GLUCOSE SERPL-MCNC: 87 MG/DL — SIGNIFICANT CHANGE UP (ref 70–99)
HCT VFR BLD CALC: 35.3 % — LOW (ref 39–50)
HGB BLD-MCNC: 11.2 G/DL — LOW (ref 13–17)
MAGNESIUM SERPL-MCNC: 2.2 MG/DL — SIGNIFICANT CHANGE UP (ref 1.6–2.6)
MCHC RBC-ENTMCNC: 26.7 PG — LOW (ref 27–34)
MCHC RBC-ENTMCNC: 31.7 % — LOW (ref 32–36)
MCV RBC AUTO: 84.2 FL — SIGNIFICANT CHANGE UP (ref 80–100)
NRBC # FLD: 0 — SIGNIFICANT CHANGE UP
NT-PROBNP SERPL-SCNC: 2923 PG/ML — SIGNIFICANT CHANGE UP
PHOSPHATE SERPL-MCNC: 4 MG/DL — SIGNIFICANT CHANGE UP (ref 2.5–4.5)
PLATELET # BLD AUTO: 264 K/UL — SIGNIFICANT CHANGE UP (ref 150–400)
PMV BLD: 11.2 FL — SIGNIFICANT CHANGE UP (ref 7–13)
POTASSIUM SERPL-MCNC: 4.2 MMOL/L — SIGNIFICANT CHANGE UP (ref 3.5–5.3)
POTASSIUM SERPL-SCNC: 4.2 MMOL/L — SIGNIFICANT CHANGE UP (ref 3.5–5.3)
RBC # BLD: 4.19 M/UL — LOW (ref 4.2–5.8)
RBC # FLD: 13.6 % — SIGNIFICANT CHANGE UP (ref 10.3–14.5)
SODIUM SERPL-SCNC: 138 MMOL/L — SIGNIFICANT CHANGE UP (ref 135–145)
TSH SERPL-MCNC: 4.79 UIU/ML — HIGH (ref 0.27–4.2)
WBC # BLD: 4.22 K/UL — SIGNIFICANT CHANGE UP (ref 3.8–10.5)
WBC # FLD AUTO: 4.22 K/UL — SIGNIFICANT CHANGE UP (ref 3.8–10.5)

## 2018-11-19 PROCEDURE — 99232 SBSQ HOSP IP/OBS MODERATE 35: CPT

## 2018-11-19 PROCEDURE — 93306 TTE W/DOPPLER COMPLETE: CPT | Mod: 26

## 2018-11-19 RX ORDER — DEXTROSE MONOHYDRATE, SODIUM CHLORIDE, AND POTASSIUM CHLORIDE 50; .745; 4.5 G/1000ML; G/1000ML; G/1000ML
1000 INJECTION, SOLUTION INTRAVENOUS
Qty: 0 | Refills: 0 | Status: DISCONTINUED | OUTPATIENT
Start: 2018-11-19 | End: 2018-11-19

## 2018-11-19 RX ORDER — CLONAZEPAM 1 MG
2 TABLET ORAL AT BEDTIME
Qty: 0 | Refills: 0 | Status: DISCONTINUED | OUTPATIENT
Start: 2018-11-19 | End: 2018-11-21

## 2018-11-19 RX ADMIN — DRONEDARONE 400 MILLIGRAM(S): 400 TABLET, FILM COATED ORAL at 18:22

## 2018-11-19 RX ADMIN — DRONEDARONE 400 MILLIGRAM(S): 400 TABLET, FILM COATED ORAL at 05:34

## 2018-11-19 RX ADMIN — Medication 50 MILLIGRAM(S): at 18:22

## 2018-11-19 RX ADMIN — Medication 150 MILLIGRAM(S): at 21:35

## 2018-11-19 RX ADMIN — Medication 9 MILLIGRAM(S): at 21:35

## 2018-11-19 RX ADMIN — Medication 50 MILLIGRAM(S): at 05:34

## 2018-11-19 RX ADMIN — RISPERIDONE 1 MILLIGRAM(S): 4 TABLET ORAL at 21:35

## 2018-11-19 RX ADMIN — RISPERIDONE 1 MILLIGRAM(S): 4 TABLET ORAL at 05:34

## 2018-11-19 RX ADMIN — Medication 2 MILLIGRAM(S): at 21:35

## 2018-11-19 RX ADMIN — ENOXAPARIN SODIUM 40 MILLIGRAM(S): 100 INJECTION SUBCUTANEOUS at 13:39

## 2018-11-19 RX ADMIN — DEXTROSE MONOHYDRATE, SODIUM CHLORIDE, AND POTASSIUM CHLORIDE 75 MILLILITER(S): 50; .745; 4.5 INJECTION, SOLUTION INTRAVENOUS at 10:35

## 2018-11-19 NOTE — PROVIDER CONTACT NOTE (OTHER) - BACKGROUND
32 Y/o male with hx of mental retardation, paroxysmal AFIB, and seizures, currently s/p incisional hernia repair with mesh.
32 Y/o male with hx of mental retardation, paroxysmal AFIB, and seizures, currently s/p incisional hernia repair with mesh.
34 Y/o male with hx of mental retardation, paroxysmal AFIB, and seizures, currently s/p incisional hernia repair with mesh.
34 Y/o male with hx of mental retardation, paroxysmal AFIB, and seizures, currently s/p incisional hernia repair with mesh.
Pt has hernia repair with mesh 11/13
Status post incisional hernia repair with mesh.
s/p hernia repair with mesh c/b CHARLES
s/p hernia repair, emesis yesterday leading to NG tube insertion
s/p hernia repair, ngt placement, history of afib
s/p hernia repair, ngt placement.
s/p hernia repair, patient bradycardic with emesis, NG tube inserted
s/p hernia repair, patient bradycardic with emesis, NG tube inserted
32 Y/o male with hx of mental retardation, paroxysmal AFIB, and seizures, currently s/p incisional hernia repair with mesh.

## 2018-11-19 NOTE — PROVIDER CONTACT NOTE (OTHER) - SITUATION
, bp 103/67, 95% 02, 99.5 temp, 18 resp
BP- 98/62, cannot give lopressor 50mg PO as it is below the parameter, pt is currently in rapid afib in 110s
HR 52
Metoprolol 10mg IVP needs to be changed to IVPB as per pharmacy, it is against hospital policy to administer more than 5mg IVP
Patient had large amount of emesis, approximately 200ml of undigested food from last night. Patient O2 sats decreased to 87% post emesis and became bradycardic HR 47
Patient intermittently tachycardic , patient due for metoprolol IVPB
Patient intermittently tachycardic and desaturation. Pt. on cont pulse ox and observaed patient  and O2 sat 87% on RA. Upon entering patient room to obtain vital signs HR normalized O2 applied
Patient vomited after dinner
Pt hypotensive after PO dose of metoprolol with 0600 medications
asking how to administer depakote, cannot be crushed & patient has NG tube
asking if patient should be on tele floor. has afib, heart rate ranging from 50 to 160.
patients heart rate continuing to escalate, went as high as 170, currently ranging from 
Patient continues to be bradycardic HR 52

## 2018-11-19 NOTE — PROVIDER CONTACT NOTE (OTHER) - NAME OF MD/NP/PA/DO NOTIFIED:
DR. LAYNE
Martín Yadav MD x [phone]
SLADE SANTOS MD
SLADE SANTOS MD   x [phone]
SLADE SANTOS MD   x [phone]
SLADE SANTOS MD   x [phone]
Surg A Mila
Surgery A [id number]
VIKTORIA GRADY MD
emilia murillo MD
ned yung MD
ned yung md
Martín Yadav MD x [phone]

## 2018-11-19 NOTE — PROVIDER CONTACT NOTE (OTHER) - REASON
BP- 98/62
Emesis, Desaturation, and bradycardic
HR 52
Hypotension after PO metoprolol
Metoprolol 10mg IVP needs to be changed to IVPB
Patient intermittently tachycardic
Patient intermittently tachycardic and desaturation
Patient vomited after dinner
asking how to administer depakote, cannot be crushed & patient has NG tube
pts HR went as high as 170
questioning if patient should be on a tele floor
Bradycardic

## 2018-11-19 NOTE — PROVIDER CONTACT NOTE (OTHER) - DATE AND TIME:
15-Nov-2018 07:30
16-Nov-2018 13:17
16-Nov-2018 20:18
17-Nov-2018 02:30
17-Nov-2018 05:54
17-Nov-2018 09:34
17-Nov-2018 13:38
17-Nov-2018 19:30
17-Nov-2018 22:30
18-Nov-2018 07:37
18-Nov-2018 21:25
19-Nov-2018 10:38
16-Nov-2018 17:50

## 2018-11-19 NOTE — PROGRESS NOTE ADULT - SUBJECTIVE AND OBJECTIVE BOX
Date of Admission:    24H hour events:     MEDICATIONS:  dronedarone 400 milliGRAM(s) Oral two times a day  enoxaparin Injectable 40 milliGRAM(s) SubCutaneous daily  metoprolol tartrate 50 milliGRAM(s) Oral three times a day        clonazePAM Tablet 2 milliGRAM(s) Oral at bedtime  melatonin 9 milliGRAM(s) Oral at bedtime PRN  risperiDONE   Tablet 1 milliGRAM(s) Oral two times a day  traZODone 150 milliGRAM(s) Oral at bedtime        benzocaine 15 mG/menthol 3.6 mG Lozenge 1 Lozenge Oral three times a day PRN  dextrose 5% + sodium chloride 0.45% with potassium chloride 20 mEq/L 1000 milliLiter(s) IV Continuous <Continuous>      REVIEW OF SYSTEMS:  Complete 10point ROS negative.    PHYSICAL EXAM:  T(C): 36.3 (11-19-18 @ 07:43), Max: 37.7 (11-18-18 @ 14:05)  HR: 103 (11-19-18 @ 10:38) (83 - 132)  BP: 85/54 (11-19-18 @ 10:38) (85/54 - 151/87)  RR: 18 (11-19-18 @ 07:43) (16 - 18)  SpO2: 95% (11-19-18 @ 07:43) (93% - 100%)  Wt(kg): --  I&O's Summary    19 Nov 2018 07:01  -  19 Nov 2018 10:45  --------------------------------------------------------  IN: 540 mL / OUT: 0 mL / NET: 540 mL        Appearance: Normal	  HEENT:   Normal oral mucosa, PERRL, EOMI	  Lymphatic: No lymphadenopathy  Cardiovascular: Normal S1 S2, No JVD, No murmurs, No edema  Respiratory: Lungs clear to auscultation	  Psychiatry: A & O x 3, Mood & affect appropriate  Gastrointestinal:  Soft, Non-tender, + BS	  Skin: No rashes, No ecchymoses, No cyanosis	  Neurologic: Non-focal  Extremities: Normal range of motion, No clubbing, cyanosis or edema  Vascular: Peripheral pulses palpable 2+ bilaterally        LABS:	 	    CBC Full  -  ( 19 Nov 2018 05:31 )  WBC Count : 4.22 K/uL  Hemoglobin : 11.2 g/dL  Hematocrit : 35.3 %  Platelet Count - Automated : 264 K/uL  Mean Cell Volume : 84.2 fL  Mean Cell Hemoglobin : 26.7 pg  Mean Cell Hemoglobin Concentration : 31.7 %  Auto Neutrophil # : x  Auto Lymphocyte # : x  Auto Monocyte # : x  Auto Eosinophil # : x  Auto Basophil # : x  Auto Neutrophil % : x  Auto Lymphocyte % : x  Auto Monocyte % : x  Auto Eosinophil % : x  Auto Basophil % : x    11-19    138  |  100  |  17  ----------------------------<  87  4.2   |  27  |  1.15  11-18    136  |  96<L>  |  15  ----------------------------<  103<H>  4.0   |  28  |  0.95    Ca    8.6      19 Nov 2018 05:31  Ca    8.4      18 Nov 2018 06:35  Phos  4.0     11-19  Phos  3.0     11-18  Mg     2.2     11-19  Mg     1.8     11-18        proBNP: Serum Pro-Brain Natriuretic Peptide: 2923 pg/mL (11-19 @ 05:31)    Lipid Profile:          	  	  ASSESSMENT/PLAN: 	    Remains in rapid afib. On a reasonable dose of BB as well as Multaq, though has not yet gotten them regularly. Continue current regimen. If unable to get meds orally/NGT, will need diltiazem gtt.        Mp Arriaga MD, FACC  [id number]

## 2018-11-19 NOTE — PROVIDER CONTACT NOTE (OTHER) - RECOMMENDATIONS
Notify MD
Hold lopressor
Notify MD
Notify MD, NPO, CXR and AXR?
PO intake encouraged, continue to monitor vitals
md notify
tele [facility]

## 2018-11-19 NOTE — PROGRESS NOTE ADULT - ASSESSMENT
35M s/p incisional hernia repair with mesh 11/13 c/b post-op ileus. Now +/+    - Cardiology recs appreciated, adjustments to medications made  - Home meds   - Advance Diet  - Monitor GI fxn   - Pain control  - DVT ppx

## 2018-11-19 NOTE — PROVIDER CONTACT NOTE (OTHER) - ACTION/TREATMENT ORDERED:
MD aware, no new orders at this time. MD stated if bradycardia continues he will consult cardiology. Will continue to monitor patient.
DR. Cody recommend to monitor vitals every 4 hours and will stay over night for observation.
MD aware and cardiology to consult on patient. Will continue to monitor patient.
MD aware and will change order. Will continue to monitor patient.
MD aware, EKG ordered, NGT to be inserted , Chest PT, AXR anc CXR to be done, NPO/IVF. Will continue to monitor patient.
MD aware, said to give it anyway. told him it is ER, cannot be crushed, said its better to get a little bit than none at all. will call pharmacy to find out if there is a different form of med.
MD aware, will continue to monitor
MD aware. No new orders at this time. Will continue to monitor patient.
MD made aware, will give 10mg lopressor, will continue to monitor
MD made aware, will let RN know as soon as he knows if patient is going to tele or not, will continue to monitor
Okay to hold lopressor
Will call cardiology and round on pt. Safety maintained, will continue to monitor.
md made aware, awaiting recommendation from cardiologist fellow, will continue to monitor

## 2018-11-19 NOTE — PROGRESS NOTE ADULT - SUBJECTIVE AND OBJECTIVE BOX
GENERAL SURGERY DAILY PROGRESS NOTE:       Subjective:  Pt seen and examined at bedside. No acute events overnight. Pain controlled. NPO (-)N/V, +Flatus (+) BM yesterday  Objective:    PE:  Gen: resting comfortably in bed, NAD  Resp: breathing comfortably  Abd: soft, NT, moderately distended. No rebound/guarding. Incision c/d/i.       Vital Signs Last 24 Hrs  T(C): 36.8 (19 Nov 2018 11:42), Max: 36.8 (19 Nov 2018 11:42)  T(F): 98.2 (19 Nov 2018 11:42), Max: 98.2 (19 Nov 2018 11:42)  HR: 99 (19 Nov 2018 13:34) (95 - 118)  BP: 99/54 (19 Nov 2018 13:34) (85/54 - 151/87)  BP(mean): 59 (19 Nov 2018 10:38) (59 - 65)  RR: 18 (19 Nov 2018 11:42) (16 - 18)  SpO2: 95% (19 Nov 2018 11:42) (95% - 100%)              MEDICATIONS  (STANDING):  clonazePAM Tablet 2 milliGRAM(s) Oral at bedtime  dextrose 5% + sodium chloride 0.45% with potassium chloride 20 mEq/L 1000 milliLiter(s) (100 mL/Hr) IV Continuous <Continuous>  diVALproex Sprinkle 375 milliGRAM(s) Oral two times a day  dronedarone 400 milliGRAM(s) Oral <User Schedule>  enoxaparin Injectable 40 milliGRAM(s) SubCutaneous daily  metoprolol tartrate IVPB 10 milliGRAM(s) IV Intermittent every 6 hours  risperiDONE   Tablet 1 milliGRAM(s) Oral <User Schedule>  traZODone 150 milliGRAM(s) Oral at bedtime    MEDICATIONS  (PRN):  benzocaine 15 mG/menthol 3.6 mG Lozenge 1 Lozenge Oral three times a day PRN Sore Throat      LABS:                        11.4   2.64  )-----------( 228      ( 17 Nov 2018 07:20 )             35.8     11-17    137  |  96<L>  |  21  ----------------------------<  99  4.7   |  30  |  1.14    Ca    8.9      17 Nov 2018 11:45  Phos  3.8     11-16  Mg     2.0     11-16

## 2018-11-19 NOTE — PROVIDER CONTACT NOTE (OTHER) - ASSESSMENT
NAD, cardiologist present when EKG was being done. recommends tele, will ask cardiologist Fellow for recommendation, MD SANTOS will let RN know when decision is made
Patient A&Ox1, hx of MR. Denies chest pain and no visable signs of pain noted. Resting comfortably in bed.
Patient A&Ox1, hx of MR. Patient intermittently tachycardic  Denies chest pain and no visable signs of pain noted. Resting comfortably in bed.
Patient A&Ox1, hx of MR. Patient intermittently tachycardic and desaturation noted via cont pulse ox  and O2 sat 87% on RA. Upon entering patient room to obtain vital signs HR normalized to 79 O2 applied O2 sat improved 93%. Denies chest pain and no visable signs of pain noted.
Patient A&Ox1, hx of MR. Patient vomited large amount of undigested food approximately 200ml, O2 sat level decreased to 87% post emesis and became bradycardic HR 47. Denies chest pain and no visable signs of pain noted. 3L of O2 applied via NC with improvement
Pt in CHARLES. Hypotensive as low as 85/51. Tolerating clears
Shows no signs of hypotension, sister at bedside calming pt down as he is slightly agitated
Vital signs WNL
asymptomatic
asymptomatic
asymptomatic, resting in bed, no signs of acute pain or distress
asymtomatic, laying comfortably in bed
Patient A&Ox1, hx of MR. VS stable with exception to low HR 52.  Denies chest pain and no visable signs of pain noted.

## 2018-11-20 LAB
BUN SERPL-MCNC: 12 MG/DL — SIGNIFICANT CHANGE UP (ref 7–23)
CALCIUM SERPL-MCNC: 8.3 MG/DL — LOW (ref 8.4–10.5)
CHLORIDE SERPL-SCNC: 100 MMOL/L — SIGNIFICANT CHANGE UP (ref 98–107)
CO2 SERPL-SCNC: 29 MMOL/L — SIGNIFICANT CHANGE UP (ref 22–31)
CREAT SERPL-MCNC: 0.99 MG/DL — SIGNIFICANT CHANGE UP (ref 0.5–1.3)
GLUCOSE SERPL-MCNC: 81 MG/DL — SIGNIFICANT CHANGE UP (ref 70–99)
HCT VFR BLD CALC: 32.6 % — LOW (ref 39–50)
HGB BLD-MCNC: 10.3 G/DL — LOW (ref 13–17)
MAGNESIUM SERPL-MCNC: 2.1 MG/DL — SIGNIFICANT CHANGE UP (ref 1.6–2.6)
MCHC RBC-ENTMCNC: 26.5 PG — LOW (ref 27–34)
MCHC RBC-ENTMCNC: 31.6 % — LOW (ref 32–36)
MCV RBC AUTO: 83.8 FL — SIGNIFICANT CHANGE UP (ref 80–100)
NRBC # FLD: 0 — SIGNIFICANT CHANGE UP
PHOSPHATE SERPL-MCNC: 3.4 MG/DL — SIGNIFICANT CHANGE UP (ref 2.5–4.5)
PLATELET # BLD AUTO: 279 K/UL — SIGNIFICANT CHANGE UP (ref 150–400)
PMV BLD: 10.8 FL — SIGNIFICANT CHANGE UP (ref 7–13)
POTASSIUM SERPL-MCNC: 4 MMOL/L — SIGNIFICANT CHANGE UP (ref 3.5–5.3)
POTASSIUM SERPL-SCNC: 4 MMOL/L — SIGNIFICANT CHANGE UP (ref 3.5–5.3)
RBC # BLD: 3.89 M/UL — LOW (ref 4.2–5.8)
RBC # FLD: 13.8 % — SIGNIFICANT CHANGE UP (ref 10.3–14.5)
SODIUM SERPL-SCNC: 136 MMOL/L — SIGNIFICANT CHANGE UP (ref 135–145)
WBC # BLD: 3.67 K/UL — LOW (ref 3.8–10.5)
WBC # FLD AUTO: 3.67 K/UL — LOW (ref 3.8–10.5)

## 2018-11-20 PROCEDURE — 99232 SBSQ HOSP IP/OBS MODERATE 35: CPT

## 2018-11-20 RX ORDER — ENOXAPARIN SODIUM 100 MG/ML
40 INJECTION SUBCUTANEOUS DAILY
Qty: 0 | Refills: 0 | Status: DISCONTINUED | OUTPATIENT
Start: 2018-11-20 | End: 2018-11-21

## 2018-11-20 RX ORDER — ACETAMINOPHEN 500 MG
650 TABLET ORAL ONCE
Qty: 0 | Refills: 0 | Status: COMPLETED | OUTPATIENT
Start: 2018-11-20 | End: 2018-11-20

## 2018-11-20 RX ADMIN — DRONEDARONE 400 MILLIGRAM(S): 400 TABLET, FILM COATED ORAL at 05:00

## 2018-11-20 RX ADMIN — Medication 50 MILLIGRAM(S): at 21:00

## 2018-11-20 RX ADMIN — Medication 650 MILLIGRAM(S): at 14:41

## 2018-11-20 RX ADMIN — Medication 150 MILLIGRAM(S): at 21:01

## 2018-11-20 RX ADMIN — Medication 2 MILLIGRAM(S): at 15:22

## 2018-11-20 RX ADMIN — Medication 50 MILLIGRAM(S): at 05:00

## 2018-11-20 RX ADMIN — Medication 9 MILLIGRAM(S): at 21:00

## 2018-11-20 RX ADMIN — RISPERIDONE 1 MILLIGRAM(S): 4 TABLET ORAL at 17:28

## 2018-11-20 RX ADMIN — RISPERIDONE 1 MILLIGRAM(S): 4 TABLET ORAL at 05:00

## 2018-11-20 RX ADMIN — Medication 650 MILLIGRAM(S): at 14:40

## 2018-11-20 RX ADMIN — Medication 50 MILLIGRAM(S): at 14:40

## 2018-11-20 RX ADMIN — DRONEDARONE 400 MILLIGRAM(S): 400 TABLET, FILM COATED ORAL at 17:28

## 2018-11-20 RX ADMIN — ENOXAPARIN SODIUM 40 MILLIGRAM(S): 100 INJECTION SUBCUTANEOUS at 12:43

## 2018-11-20 NOTE — PROGRESS NOTE ADULT - SUBJECTIVE AND OBJECTIVE BOX
Patient is a 33y old  Male who presents with a chief complaint of incisional hernia repairHPI:  3 (18 Nov 2018 17:08)        SUBJECTIVE / OVERNIGHT EVENTS: Afib RVR last night, spontaneously converted to NSR      MEDICATIONS  (STANDING):  clonazePAM Tablet 2 milliGRAM(s) Oral at bedtime  dronedarone 400 milliGRAM(s) Oral two times a day  enoxaparin Injectable 40 milliGRAM(s) SubCutaneous daily  metoprolol tartrate 50 milliGRAM(s) Oral three times a day  risperiDONE   Tablet 1 milliGRAM(s) Oral two times a day  traZODone 150 milliGRAM(s) Oral at bedtime    MEDICATIONS  (PRN):  benzocaine 15 mG/menthol 3.6 mG Lozenge 1 Lozenge Oral three times a day PRN Sore Throat  melatonin 9 milliGRAM(s) Oral at bedtime PRN Sleep        CAPILLARY BLOOD GLUCOSE        I&O's Summary    19 Nov 2018 07:01  -  20 Nov 2018 07:00  --------------------------------------------------------  IN: 540 mL / OUT: 0 mL / NET: 540 mL        PHYSICAL EXAM  GENERAL: NAD, well-developed  HEAD:  Atraumatic, Normocephalic  EYES: EOMI, PERRLA, conjunctiva and sclera clear  NECK: Supple, No JVD  CHEST/LUNG: Clear to auscultation bilaterally; No wheeze  HEART: Regular rate and rhythm; +S1S2 No murmurs, rubs, or gallops  ABDOMEN: Soft, Nontender; Bowel sounds present, healing longitudinal surgical scar  EXTREMITIES:  2+ Peripheral Pulses, No clubbing, cyanosis, or edema  PSYCH: unable to asses  SKIN: clean, dry, well-healing stapled abdominal surgical site    LABS:                        10.3   3.67  )-----------( 279      ( 20 Nov 2018 06:25 )             32.6     11-20    136  |  100  |  12  ----------------------------<  81  4.0   |  29  |  0.99    Ca    8.3<L>      20 Nov 2018 06:25  Phos  3.4     11-20  Mg     2.1     11-20                RADIOLOGY & ADDITIONAL TESTS:    Imaging Personally Reviewed:  Consultant(s) Notes Reviewed:    Care Discussed with Consultants/Other Providers:

## 2018-11-20 NOTE — PROGRESS NOTE ADULT - ASSESSMENT
ASSESMENT/PLAN: Patient is a 33 year-old male with a past medical history of intellectual disabilities/Autism, paroxysmal Afib, seizures (last episode >5 years ago), presents for Incisional Hernia Repair with mesh course c/b atrial fibrillation with RVR    #Afib with RVR: paroxysmal episode last night.   - Optimize rest, minimize interruption/stimulation   - Continue current regimen of lopressor 50mg TID (hold parameters), Multasq 400mg BID.   - Can use lopressor 5mg IVP as needed for persistent HR>150bpm and closely monitor vital signs. ASSESMENT/PLAN: Patient is a 33 year-old male with a past medical history of intellectual disabilities/Autism, paroxysmal Afib, seizures (last episode >5 years ago), presents for Incisional Hernia Repair with mesh course c/b atrial fibrillation with RVR    #Afib with RVR: paroxysmal episode last night.   - Optimize rest, minimize interruption/stimulation   - Continue current regimen of lopressor 50mg TID (hold parameters), Multaq 400mg BID.   - Can use lopressor 5mg IVP as needed for persistent HR>150bpm and closely monitor vital signs. ASSESMENT/PLAN: Patient is a 33 year-old male with a past medical history of intellectual disabilities/Autism, paroxysmal Afib, seizures (last episode >5 years ago), presents for Incisional Hernia Repair with mesh course c/b atrial fibrillation with RVR    #Afib with RVR: paroxysmal episode last night.   - Optimize rest, minimize interruption/stimulation   - Continue current regimen of lopressor 50mg TID (hold parameters), Multaq 400mg BID.   - Can use lopressor 5mg IVP as needed for persistent HR>150bpm and closely monitor vital signs.   - If patient remains free of RVR episodes for 24 hrs after last episode, he can be discharged on the above regimen (lopressor 50mg TID and multaq 400mg TID) with outpatient follow up. However, if episodes of AFib RVR recure, he will need an EP consult.

## 2018-11-20 NOTE — PHYSICAL THERAPY INITIAL EVALUATION ADULT - LIVES WITH, PROFILE
family is available to assist t/o the day; patient has a HHA for a few hours a day and when HHA not present, family is able to assist

## 2018-11-20 NOTE — PHYSICAL THERAPY INITIAL EVALUATION ADULT - ADDITIONAL COMMENTS
Patient has a flight of stairs to negotiate but family member or Home Health Aide avail to supervise.

## 2018-11-20 NOTE — PROGRESS NOTE ADULT - ASSESSMENT
35M s/p incisional hernia repair with mesh 11/13 c/b post-op ileus. Now +/+.    - Cardiology recs appreciated, adjustments to medications made: lopressor 50 mg TID, multasq 400mg BID, can use lopressor 5 mg IVP as needed for persistent HR> 150bpm and closely monitor VS.  - Home meds.  - C/w LRD.  - Monitor GI fxn.  - Pain control.  - DVT ppx.  - PT c/s, appreciate recs.

## 2018-11-20 NOTE — PROGRESS NOTE ADULT - SUBJECTIVE AND OBJECTIVE BOX
Surgery Progress Note    SUBJECTIVE: Pt seen and examined at bedside. Patient comfortable and in no-apparent distress.    Vital Signs Last 24 Hrs  T(C): 36.3 (20 Nov 2018 08:38), Max: 36.8 (19 Nov 2018 11:42)  T(F): 97.4 (20 Nov 2018 08:38), Max: 98.2 (19 Nov 2018 11:42)  HR: 61 (20 Nov 2018 08:38) (61 - 145)  BP: 113/71 (20 Nov 2018 08:38) (98/58 - 140/80)  BP(mean): 89 (19 Nov 2018 18:20) (89 - 95)  RR: 18 (20 Nov 2018 08:38) (18 - 18)  SpO2: 98% (20 Nov 2018 08:38) (95% - 99%)    Physical Exam:  Gen: resting comfortably in bed, NAD  Resp: breathing comfortably  Abd: soft, NT, softly distended. No rebound/guarding. Incision c/d/i.     LABS:                        10.3   3.67  )-----------( 279      ( 20 Nov 2018 06:25 )             32.6     11-20    136  |  100  |  12  ----------------------------<  81  4.0   |  29  |  0.99    Ca    8.3<L>      20 Nov 2018 06:25  Phos  3.4     11-20  Mg     2.1     11-20            INs and OUTs:    11-19-18 @ 07:01  -  11-20-18 @ 07:00  --------------------------------------------------------  IN: 540 mL / OUT: 0 mL / NET: 540 mL

## 2018-11-20 NOTE — PHYSICAL THERAPY INITIAL EVALUATION ADULT - DISCHARGE DISPOSITION, PT EVAL
Home with no PT needs; patient able to ambulate 150 feet, independent, no AD, family member present; negotiates stairs with supervision with family present; according to family member, patient at baseline functional status

## 2018-11-21 VITALS
DIASTOLIC BLOOD PRESSURE: 68 MMHG | SYSTOLIC BLOOD PRESSURE: 108 MMHG | OXYGEN SATURATION: 95 % | HEART RATE: 77 BPM | RESPIRATION RATE: 18 BRPM | TEMPERATURE: 98 F

## 2018-11-21 PROBLEM — R56.9 UNSPECIFIED CONVULSIONS: Chronic | Status: ACTIVE | Noted: 2018-11-02

## 2018-11-21 PROBLEM — K43.2 INCISIONAL HERNIA WITHOUT OBSTRUCTION OR GANGRENE: Chronic | Status: ACTIVE | Noted: 2018-11-02

## 2018-11-21 LAB
BUN SERPL-MCNC: 12 MG/DL — SIGNIFICANT CHANGE UP (ref 7–23)
CALCIUM SERPL-MCNC: 8.1 MG/DL — LOW (ref 8.4–10.5)
CHLORIDE SERPL-SCNC: 100 MMOL/L — SIGNIFICANT CHANGE UP (ref 98–107)
CO2 SERPL-SCNC: 29 MMOL/L — SIGNIFICANT CHANGE UP (ref 22–31)
CREAT SERPL-MCNC: 0.93 MG/DL — SIGNIFICANT CHANGE UP (ref 0.5–1.3)
GLUCOSE SERPL-MCNC: 79 MG/DL — SIGNIFICANT CHANGE UP (ref 70–99)
HCT VFR BLD CALC: 31.2 % — LOW (ref 39–50)
HGB BLD-MCNC: 10 G/DL — LOW (ref 13–17)
MAGNESIUM SERPL-MCNC: 1.9 MG/DL — SIGNIFICANT CHANGE UP (ref 1.6–2.6)
MCHC RBC-ENTMCNC: 26.8 PG — LOW (ref 27–34)
MCHC RBC-ENTMCNC: 32.1 % — SIGNIFICANT CHANGE UP (ref 32–36)
MCV RBC AUTO: 83.6 FL — SIGNIFICANT CHANGE UP (ref 80–100)
NRBC # FLD: 0 — SIGNIFICANT CHANGE UP
PHOSPHATE SERPL-MCNC: 3.9 MG/DL — SIGNIFICANT CHANGE UP (ref 2.5–4.5)
PLATELET # BLD AUTO: 294 K/UL — SIGNIFICANT CHANGE UP (ref 150–400)
PMV BLD: 10.3 FL — SIGNIFICANT CHANGE UP (ref 7–13)
POTASSIUM SERPL-MCNC: 4 MMOL/L — SIGNIFICANT CHANGE UP (ref 3.5–5.3)
POTASSIUM SERPL-SCNC: 4 MMOL/L — SIGNIFICANT CHANGE UP (ref 3.5–5.3)
RBC # BLD: 3.73 M/UL — LOW (ref 4.2–5.8)
RBC # FLD: 13.4 % — SIGNIFICANT CHANGE UP (ref 10.3–14.5)
SODIUM SERPL-SCNC: 138 MMOL/L — SIGNIFICANT CHANGE UP (ref 135–145)
WBC # BLD: 3.69 K/UL — LOW (ref 3.8–10.5)
WBC # FLD AUTO: 3.69 K/UL — LOW (ref 3.8–10.5)

## 2018-11-21 RX ORDER — METOPROLOL TARTRATE 50 MG
1 TABLET ORAL
Qty: 90 | Refills: 0
Start: 2018-11-21 | End: 2018-12-20

## 2018-11-21 RX ORDER — TRAZODONE HCL 50 MG
1 TABLET ORAL
Qty: 0 | Refills: 0 | DISCHARGE
Start: 2018-11-21

## 2018-11-21 RX ORDER — METOPROLOL TARTRATE 50 MG
1 TABLET ORAL
Qty: 0 | Refills: 0 | COMMUNITY

## 2018-11-21 RX ORDER — RISPERIDONE 4 MG/1
1 TABLET ORAL
Qty: 0 | Refills: 0 | COMMUNITY

## 2018-11-21 RX ORDER — TRAZODONE HCL 50 MG
1.5 TABLET ORAL
Qty: 0 | Refills: 0 | COMMUNITY

## 2018-11-21 RX ORDER — DRONEDARONE 400 MG/1
1 TABLET, FILM COATED ORAL
Qty: 0 | Refills: 0 | COMMUNITY

## 2018-11-21 RX ORDER — RISPERIDONE 4 MG/1
1 TABLET ORAL
Qty: 0 | Refills: 0 | DISCHARGE
Start: 2018-11-21

## 2018-11-21 RX ORDER — MAGNESIUM SULFATE 500 MG/ML
1 VIAL (ML) INJECTION ONCE
Qty: 0 | Refills: 0 | Status: COMPLETED | OUTPATIENT
Start: 2018-11-21 | End: 2018-11-21

## 2018-11-21 RX ORDER — CLONAZEPAM 1 MG
1 TABLET ORAL
Qty: 0 | Refills: 0 | COMMUNITY

## 2018-11-21 RX ORDER — DRONEDARONE 400 MG/1
1 TABLET, FILM COATED ORAL
Qty: 0 | Refills: 0 | DISCHARGE
Start: 2018-11-21

## 2018-11-21 RX ORDER — CLONAZEPAM 1 MG
1 TABLET ORAL
Qty: 0 | Refills: 0 | DISCHARGE
Start: 2018-11-21

## 2018-11-21 RX ADMIN — Medication 50 MILLIGRAM(S): at 06:04

## 2018-11-21 RX ADMIN — DRONEDARONE 400 MILLIGRAM(S): 400 TABLET, FILM COATED ORAL at 06:04

## 2018-11-21 RX ADMIN — Medication 50 MILLIGRAM(S): at 13:08

## 2018-11-21 RX ADMIN — RISPERIDONE 1 MILLIGRAM(S): 4 TABLET ORAL at 06:04

## 2018-11-21 RX ADMIN — Medication 2 MILLIGRAM(S): at 14:21

## 2018-11-21 RX ADMIN — ENOXAPARIN SODIUM 40 MILLIGRAM(S): 100 INJECTION SUBCUTANEOUS at 11:55

## 2018-11-21 NOTE — PROGRESS NOTE ADULT - SUBJECTIVE AND OBJECTIVE BOX
Patient is a 33y old  Male who presents with a chief complaint of Hernia Repair/Atrial Fibrillation (20 Nov 2018 10:46)        SUBJECTIVE / OVERNIGHT EVENTS: No significant overnight events.       MEDICATIONS  (STANDING):  clonazePAM Tablet 2 milliGRAM(s) Oral at bedtime  dronedarone 400 milliGRAM(s) Oral two times a day  enoxaparin Injectable 40 milliGRAM(s) SubCutaneous daily  magnesium sulfate  IVPB 1 Gram(s) IV Intermittent once  metoprolol tartrate 50 milliGRAM(s) Oral three times a day  risperiDONE   Tablet 1 milliGRAM(s) Oral two times a day  traZODone 150 milliGRAM(s) Oral at bedtime    MEDICATIONS  (PRN):  benzocaine 15 mG/menthol 3.6 mG Lozenge 1 Lozenge Oral three times a day PRN Sore Throat  melatonin 9 milliGRAM(s) Oral at bedtime PRN Sleep        CAPILLARY BLOOD GLUCOSE        I&O's Summary    20 Nov 2018 07:01  -  21 Nov 2018 07:00  --------------------------------------------------------  IN: 1440 mL / OUT: 0 mL / NET: 1440 mL        PHYSICAL EXAM  GENERAL: NAD, well-developed  HEAD:  Atraumatic, Normocephalic  EYES: EOMI, PERRLA, conjunctiva and sclera clear  NECK: Supple, No JVD  CHEST/LUNG: Clear to auscultation bilaterally; No wheeze  HEART: Regular rate and rhythm; No murmurs, rubs, or gallops  ABDOMEN: Soft, Nontender, Nondistended; Bowel sounds present  EXTREMITIES:  2+ Peripheral Pulses, No clubbing, cyanosis, or edema  PSYCH: unable to asses  SKIN: No rashes or lesions    LABS:                        10.0   3.69  )-----------( 294      ( 21 Nov 2018 05:55 )             31.2     11-21    138  |  100  |  12  ----------------------------<  79  4.0   |  29  |  0.93    Ca    8.1<L>      21 Nov 2018 05:55  Phos  3.9     11-21  Mg     1.9     11-21                RADIOLOGY & ADDITIONAL TESTS:    Imaging Personally Reviewed:  Consultant(s) Notes Reviewed:    Care Discussed with Consultants/Other Providers:

## 2018-11-21 NOTE — PROGRESS NOTE ADULT - ASSESSMENT
ASSESMENT/PLAN: Patient is a 33 year-old male with a past medical history of intellectual disabilities/Autism, paroxysmal Afib, seizures (last episode >5 years ago), presents for Incisional Hernia Repair with mesh course c/b atrial fibrillation with RVR    #Afib with RVR:  - No objection to discharge, there has been no further episodes of Afib RVR on telemetry for 36 hrs at this time  - Discharge on current regimen of lopressor 50mg TID (hold parameters), Multaq 400mg BID  - Outpatient cardiology follow up for medication management (has an appointment next week). Discharge planning per   primary team  - Plan discussed with sister at bedside and primary team (P#[id number])

## 2018-11-21 NOTE — PROGRESS NOTE ADULT - ASSESSMENT
35M s/p incisional hernia repair with mesh 11/13 c/b post-op ileus. Now +/+.    - Cardiology previous recs appreciated: lopressor 50 mg TID, multasq 400mg BID, can use lopressor 5 mg IVP as needed for persistent HR> 150bpm and closely monitor VS.  - Will f/u final cardiology recs for home.  - Home meds.  - C/w LRD.  - Monitor GI fxn.  - Pain control.  - DVT ppx.  - PT c/s: home with no PT needs.  - Likely d/c home today.

## 2018-11-21 NOTE — PROGRESS NOTE ADULT - SUBJECTIVE AND OBJECTIVE BOX
Surgery Progress Note    SUBJECTIVE: Pt seen and examined at bedside. Patient comfortable and in no-apparent distress.     Vital Signs Last 24 Hrs  T(C): 36.3 (21 Nov 2018 06:03), Max: 36.4 (20 Nov 2018 11:40)  T(F): 97.3 (21 Nov 2018 06:03), Max: 97.6 (20 Nov 2018 11:40)  HR: 62 (21 Nov 2018 06:03) (61 - 75)  BP: 102/68 (21 Nov 2018 06:03) (102/68 - 113/71)  BP(mean): --  RR: 18 (21 Nov 2018 06:03) (18 - 18)  SpO2: 95% (21 Nov 2018 06:03) (95% - 99%)    Physical Exam:  Gen: resting comfortably in bed, NAD  Resp: breathing comfortably  Abd: soft, NT, softly distended. No rebound/guarding. Incision c/d/i.     LABS:                        10.0   3.69  )-----------( 294      ( 21 Nov 2018 05:55 )             31.2     11-21    138  |  100  |  12  ----------------------------<  79  4.0   |  29  |  0.93    Ca    8.1<L>      21 Nov 2018 05:55  Phos  3.9     11-21  Mg     1.9     11-21            INs and OUTs:    11-20-18 @ 07:01  -  11-21-18 @ 07:00  --------------------------------------------------------  IN: 1440 mL / OUT: 0 mL / NET: 1440 mL

## 2018-11-21 NOTE — PROGRESS NOTE ADULT - REASON FOR ADMISSION
incisional hernia repairHPI:  3
Hernia Repair/Atrial Fibrillation
Hernia repair c/b Afib RVR
Incisional hernia repair

## 2018-11-29 NOTE — PATIENT PROFILE ADULT - NSPROEXTENSIONSOFSELF_GEN_A_NUR
0556 (!) 127/43 97.7 °F (36.5 °C) Oral 62 16 - 104 lb 4.4 oz (47.3 kg)   11/29/18 0000 - - - - - 96 % -   11/28/18 2124 (!) 134/51 97.7 °F (36.5 °C) Oral 64 16 95 % -   11/28/18 0904 - - - - - 92 % -   11/28/18 0826 (!) 140/49 97.8 °F (36.6 °C) Oral 60 18 91 % -     Intake / output   11/28 0701 - 11/29 0700  In: 2874.2 [I.V.:424]  Out: 3450 [Urine:3450]  Physical Exam:  Physical Exam   Constitutional: She is oriented to person, place, and time. She appears well-developed and well-nourished. No distress. HENT:   Mouth/Throat: No oropharyngeal exudate. Eyes: Pupils are equal, round, and reactive to light. Conjunctivae are normal. No scleral icterus. Neck: No JVD present. No thyromegaly present. Cardiovascular: Normal rate, regular rhythm and normal heart sounds. Exam reveals no gallop and no friction rub. No murmur heard. Pulmonary/Chest: Effort normal. No respiratory distress. She has no wheezes. She has no rales. Abdominal: Soft. Bowel sounds are normal. She exhibits no distension and no mass. There is no tenderness. There is no rebound and no guarding. Musculoskeletal:   PICC line in place right upper extremity. Lymphadenopathy:     She has no cervical adenopathy. Neurological: She is alert and oriented to person, place, and time. No cranial nerve deficit. She exhibits normal muscle tone. Skin: She is not diaphoretic. Nursing note and vitals reviewed.     Lower Extremities : No ankle Edema , No calf Tenderness     Laboratory findings:    Recent Labs      11/26/18   0746   WBC  8.8   HGB  11.4*   HCT  34.6*   PLT  232     Recent Labs      11/27/18   0510  11/28/18   0457  11/29/18   0449   NA  139  139  136   K  4.3  4.5  4.6   CL  107  107  105   CO2  24  23  22   GLUCOSE  226*  229*  192*   BUN  19  11  12   CREATININE  0.44*  0.39*  0.42*   CALCIUM  8.4*  8.0*  8.3*     No results for input(s): PROT, LABALBU, LABA1C, W0ZSEHS, A0BPLBU, FT4, TSH, AST, ALT, LDH, GGT, ALKPHOS, BILITOT, BILIDIR, AMMONIA, AMYLASE, LIPASE, LACTATE, CHOL, HDL, LDLCHOLESTEROL, CHOLHDLRATIO, TRIG, VLDL, BNP, TROPONINI, CKTOTAL, CKMB, CKMBINDEX, RF, RENATA in the last 72 hours. No results found for: [place], RBCUA, BLOODU, BACTERIA, NITRU, [address], LEUKOCYTESUR  Lab Results   Component Value Date    LABA1C 4.9 11/23/2018       Imaging/Diagonstics:     CT neck 11/22/18   Very  extensive very thick prevertebral heparin abscess. Warm most extent of prevertebral material at the mid thyroid level or so. Though more fluid is present in inferior to the edges sent to the carotid arteries and lower thyroid gland anteriorly. Surgical consultation as indicated. Exact percent drop aforementioned processes unclear. Could be from right tonsillar area. Moderate compromise of hypopharyngeal airway.     CT soft tissue neck 11/23/18   Impression:     Interval increase in size of retropharyngeal abscess, now seen extending  caudally behind the esophagus and superiorly into the right-sided  oropharyngeal wall. Cultures - retropharyngeal abscess 11/23/18   FEW NEUTROPHILS     Direct Exam  (Abnormal) 11/24/2018  1:45 PM [address] NEGATIVE RODS     Direct Exam  (Abnormal) 11/24/2018  1:45 PM [address] RODS     Direct Exam  (Abnormal) 11/24/2018  1:45 PM [address] COCCI IN CLUSTERS           Clinical Course : unchanged  Assessment and Plan  :        1. Peritonsillar abscess with retropharyngeal extension -  S/p I&D in OR on 11/24/18 - continue IV Zosyn for total 7 days. 2. Dysphagia - failed swallow study. PICC line in place for  TPN. 3. Smoker -   4. Alcohol abuse - no withdrawal .   5. Hyperglycemia secondary to steroids . Taper and stop . Plan for discharge if OK with ID and ENT - home with TPN and repeat swallow after antibiotics completed likely in 7-10  days .  If no improvement , patient may need PEG none

## 2018-12-03 ENCOUNTER — APPOINTMENT (OUTPATIENT)
Dept: SURGERY | Facility: CLINIC | Age: 33
End: 2018-12-03
Payer: MEDICARE

## 2018-12-03 VITALS
HEIGHT: 69 IN | BODY MASS INDEX: 26.36 KG/M2 | HEART RATE: 63 BPM | WEIGHT: 178 LBS | TEMPERATURE: 97.6 F | SYSTOLIC BLOOD PRESSURE: 117 MMHG | DIASTOLIC BLOOD PRESSURE: 77 MMHG

## 2018-12-03 DIAGNOSIS — Z09 ENCOUNTER FOR FOLLOW-UP EXAMINATION AFTER COMPLETED TREATMENT FOR CONDITIONS OTHER THAN MALIGNANT NEOPLASM: ICD-10-CM

## 2018-12-03 PROCEDURE — 99024 POSTOP FOLLOW-UP VISIT: CPT

## 2018-12-28 ENCOUNTER — EMERGENCY (EMERGENCY)
Facility: HOSPITAL | Age: 33
LOS: 1 days | Discharge: HOME | End: 2018-12-28
Attending: EMERGENCY MEDICINE
Payer: MEDICARE

## 2018-12-28 VITALS
SYSTOLIC BLOOD PRESSURE: 110 MMHG | HEART RATE: 111 BPM | DIASTOLIC BLOOD PRESSURE: 82 MMHG | RESPIRATION RATE: 19 BRPM | OXYGEN SATURATION: 100 %

## 2018-12-28 VITALS
WEIGHT: 154.98 LBS | RESPIRATION RATE: 16 BRPM | SYSTOLIC BLOOD PRESSURE: 116 MMHG | HEIGHT: 70 IN | HEART RATE: 89 BPM | OXYGEN SATURATION: 97 % | DIASTOLIC BLOOD PRESSURE: 69 MMHG

## 2018-12-28 DIAGNOSIS — Z98.890 OTHER SPECIFIED POSTPROCEDURAL STATES: Chronic | ICD-10-CM

## 2018-12-28 LAB
ALBUMIN SERPL ELPH-MCNC: 3.4 G/DL — LOW (ref 3.5–5)
ALP SERPL-CCNC: 52 U/L — SIGNIFICANT CHANGE UP (ref 40–120)
ALT FLD-CCNC: 19 U/L DA — SIGNIFICANT CHANGE UP (ref 10–60)
ANION GAP SERPL CALC-SCNC: 8 MMOL/L — SIGNIFICANT CHANGE UP (ref 5–17)
APPEARANCE UR: CLEAR — SIGNIFICANT CHANGE UP
AST SERPL-CCNC: 17 U/L — SIGNIFICANT CHANGE UP (ref 10–40)
BASOPHILS # BLD AUTO: 0 K/UL — SIGNIFICANT CHANGE UP (ref 0–0.2)
BASOPHILS NFR BLD AUTO: 0.3 % — SIGNIFICANT CHANGE UP (ref 0–2)
BILIRUB SERPL-MCNC: 0.6 MG/DL — SIGNIFICANT CHANGE UP (ref 0.2–1.2)
BILIRUB UR-MCNC: NEGATIVE — SIGNIFICANT CHANGE UP
BUN SERPL-MCNC: 16 MG/DL — SIGNIFICANT CHANGE UP (ref 7–18)
CALCIUM SERPL-MCNC: 8.2 MG/DL — LOW (ref 8.4–10.5)
CHLORIDE SERPL-SCNC: 101 MMOL/L — SIGNIFICANT CHANGE UP (ref 96–108)
CO2 SERPL-SCNC: 27 MMOL/L — SIGNIFICANT CHANGE UP (ref 22–31)
COLOR SPEC: YELLOW — SIGNIFICANT CHANGE UP
CREAT SERPL-MCNC: 0.94 MG/DL — SIGNIFICANT CHANGE UP (ref 0.5–1.3)
DIFF PNL FLD: NEGATIVE — SIGNIFICANT CHANGE UP
EOSINOPHIL # BLD AUTO: 0.1 K/UL — SIGNIFICANT CHANGE UP (ref 0–0.5)
EOSINOPHIL NFR BLD AUTO: 1.2 % — SIGNIFICANT CHANGE UP (ref 0–6)
GLUCOSE SERPL-MCNC: 78 MG/DL — SIGNIFICANT CHANGE UP (ref 70–99)
GLUCOSE UR QL: NEGATIVE — SIGNIFICANT CHANGE UP
HCT VFR BLD CALC: 45.3 % — SIGNIFICANT CHANGE UP (ref 39–50)
HGB BLD-MCNC: 13.8 G/DL — SIGNIFICANT CHANGE UP (ref 13–17)
KETONES UR-MCNC: NEGATIVE — SIGNIFICANT CHANGE UP
LEUKOCYTE ESTERASE UR-ACNC: NEGATIVE — SIGNIFICANT CHANGE UP
LYMPHOCYTES # BLD AUTO: 0.9 K/UL — LOW (ref 1–3.3)
LYMPHOCYTES # BLD AUTO: 19 % — SIGNIFICANT CHANGE UP (ref 13–44)
MCHC RBC-ENTMCNC: 26 PG — LOW (ref 27–34)
MCHC RBC-ENTMCNC: 30.5 GM/DL — LOW (ref 32–36)
MCV RBC AUTO: 85.2 FL — SIGNIFICANT CHANGE UP (ref 80–100)
MONOCYTES # BLD AUTO: 0.5 K/UL — SIGNIFICANT CHANGE UP (ref 0–0.9)
MONOCYTES NFR BLD AUTO: 9.2 % — SIGNIFICANT CHANGE UP (ref 2–14)
NEUTROPHILS # BLD AUTO: 3.5 K/UL — SIGNIFICANT CHANGE UP (ref 1.8–7.4)
NEUTROPHILS NFR BLD AUTO: 70.2 % — SIGNIFICANT CHANGE UP (ref 43–77)
NITRITE UR-MCNC: NEGATIVE — SIGNIFICANT CHANGE UP
PH UR: 6 — SIGNIFICANT CHANGE UP (ref 5–8)
PLATELET # BLD AUTO: 254 K/UL — SIGNIFICANT CHANGE UP (ref 150–400)
POTASSIUM SERPL-MCNC: 3.8 MMOL/L — SIGNIFICANT CHANGE UP (ref 3.5–5.3)
POTASSIUM SERPL-SCNC: 3.8 MMOL/L — SIGNIFICANT CHANGE UP (ref 3.5–5.3)
PROT SERPL-MCNC: 7.1 G/DL — SIGNIFICANT CHANGE UP (ref 6–8.3)
PROT UR-MCNC: 15
RBC # BLD: 5.31 M/UL — SIGNIFICANT CHANGE UP (ref 4.2–5.8)
RBC # FLD: 13.2 % — SIGNIFICANT CHANGE UP (ref 10.3–14.5)
SODIUM SERPL-SCNC: 136 MMOL/L — SIGNIFICANT CHANGE UP (ref 135–145)
SP GR SPEC: 1.01 — SIGNIFICANT CHANGE UP (ref 1.01–1.02)
UROBILINOGEN FLD QL: NEGATIVE — SIGNIFICANT CHANGE UP
VALPROATE SERPL-MCNC: 55 UG/ML — SIGNIFICANT CHANGE UP (ref 50–100)
WBC # BLD: 4.9 K/UL — SIGNIFICANT CHANGE UP (ref 3.8–10.5)
WBC # FLD AUTO: 4.9 K/UL — SIGNIFICANT CHANGE UP (ref 3.8–10.5)

## 2018-12-28 PROCEDURE — 71045 X-RAY EXAM CHEST 1 VIEW: CPT

## 2018-12-28 PROCEDURE — 80053 COMPREHEN METABOLIC PANEL: CPT

## 2018-12-28 PROCEDURE — 90715 TDAP VACCINE 7 YRS/> IM: CPT

## 2018-12-28 PROCEDURE — 99285 EMERGENCY DEPT VISIT HI MDM: CPT | Mod: 25

## 2018-12-28 PROCEDURE — 12013 RPR F/E/E/N/L/M 2.6-5.0 CM: CPT

## 2018-12-28 PROCEDURE — 87086 URINE CULTURE/COLONY COUNT: CPT

## 2018-12-28 PROCEDURE — 80164 ASSAY DIPROPYLACETIC ACD TOT: CPT

## 2018-12-28 PROCEDURE — 70450 CT HEAD/BRAIN W/O DYE: CPT

## 2018-12-28 PROCEDURE — 70450 CT HEAD/BRAIN W/O DYE: CPT | Mod: 26

## 2018-12-28 PROCEDURE — 85027 COMPLETE CBC AUTOMATED: CPT

## 2018-12-28 PROCEDURE — 71045 X-RAY EXAM CHEST 1 VIEW: CPT | Mod: 26

## 2018-12-28 PROCEDURE — 90471 IMMUNIZATION ADMIN: CPT

## 2018-12-28 PROCEDURE — 81001 URINALYSIS AUTO W/SCOPE: CPT

## 2018-12-28 PROCEDURE — 36415 COLL VENOUS BLD VENIPUNCTURE: CPT

## 2018-12-28 PROCEDURE — 82962 GLUCOSE BLOOD TEST: CPT

## 2018-12-28 RX ORDER — ACETAMINOPHEN 500 MG
975 TABLET ORAL ONCE
Qty: 0 | Refills: 0 | Status: COMPLETED | OUTPATIENT
Start: 2018-12-28 | End: 2018-12-28

## 2018-12-28 RX ORDER — KETAMINE HYDROCHLORIDE 100 MG/ML
150 INJECTION INTRAMUSCULAR; INTRAVENOUS ONCE
Qty: 0 | Refills: 0 | Status: DISCONTINUED | OUTPATIENT
Start: 2018-12-28 | End: 2018-12-28

## 2018-12-28 RX ORDER — TETANUS TOXOID, REDUCED DIPHTHERIA TOXOID AND ACELLULAR PERTUSSIS VACCINE, ADSORBED 5; 2.5; 8; 8; 2.5 [IU]/.5ML; [IU]/.5ML; UG/.5ML; UG/.5ML; UG/.5ML
0.5 SUSPENSION INTRAMUSCULAR ONCE
Qty: 0 | Refills: 0 | Status: COMPLETED | OUTPATIENT
Start: 2018-12-28 | End: 2018-12-28

## 2018-12-28 RX ADMIN — Medication 975 MILLIGRAM(S): at 14:42

## 2018-12-28 RX ADMIN — Medication 1 TABLET(S): at 14:47

## 2018-12-28 RX ADMIN — KETAMINE HYDROCHLORIDE 150 MILLIGRAM(S): 100 INJECTION INTRAMUSCULAR; INTRAVENOUS at 19:58

## 2018-12-28 RX ADMIN — Medication 1 MILLIGRAM(S): at 16:36

## 2018-12-28 RX ADMIN — TETANUS TOXOID, REDUCED DIPHTHERIA TOXOID AND ACELLULAR PERTUSSIS VACCINE, ADSORBED 0.5 MILLILITER(S): 5; 2.5; 8; 8; 2.5 SUSPENSION INTRAMUSCULAR at 14:47

## 2018-12-28 RX ADMIN — Medication 975 MILLIGRAM(S): at 16:16

## 2018-12-28 NOTE — ED PROVIDER NOTE - MEDICAL DECISION MAKING DETAILS
Pt with seizure increase recently.  Rule out acute infection. Electrolyte abnormality. Renal or Hepatic function. Obtain head CT, prophylactic antibiotics given full thickness laceration.

## 2018-12-28 NOTE — ED PROCEDURE NOTE - NS_POSTPROCCAREGUIDE_ED_ALL_ED
Patient is now fully awake, with vital signs and temperature stable, hydration is adequate, patients Lizzie’s  score is at baseline (or greater than 8), patient and escort has received  discharge education.

## 2018-12-28 NOTE — ED ADULT TRIAGE NOTE - CHIEF COMPLAINT QUOTE
witnessed seizure from home, pt on Depakote, pt is mentally challenge witnessed seizure from home, has a lac in the lower chin, pt on Depakote, pt is mentally challenge

## 2018-12-28 NOTE — ED PROVIDER NOTE - CARE PLAN
Principal Discharge DX:	Seizures  Secondary Diagnosis:	Laceration of head and neck, initial encounter

## 2018-12-28 NOTE — ED PROVIDER NOTE - OBJECTIVE STATEMENT
34 y/o M with a PMHx of MR, HTN, Epilepsy, A-fib and a PSHx of hernia repair presents to ED c/o tonic clonic seizure each lasting less than 1 minute. Both seizures were seen by sister. Second episode pt fell forward and hit chin-pt was caught by sister didn't sustain any more injuries. Sister said last seizure was 1 month ago and has been compliant Depakote and clonidine. Pt has been acting normally, no recent illness, no urinary symptoms, has been stable with same dosage for long time. NKDA.

## 2018-12-28 NOTE — ED PROVIDER NOTE - PROGRESS NOTE DETAILS
all workup wnl, lac repaired without complication, gave sister SZ precautions, put name in f/u book for neuro f/u, gave return precautions which sister demonstrated understanding of, ready for dc

## 2018-12-28 NOTE — ED ADULT NURSE NOTE - OBJECTIVE STATEMENT
from home - mentally challenged , brought by ambulance from home for a seizures , have a laceration to chin area

## 2018-12-28 NOTE — ED PROVIDER NOTE - SKIN WOUND DESCRIPTION
4 cm laceration to chin that is hemostatic on inner mucosa has abrasion, dentition is intact, no septal hematoma

## 2018-12-28 NOTE — ED ADULT NURSE REASSESSMENT NOTE - NS ED NURSE REASSESS COMMENT FT1
Patient hemodynamically stable and in no acute distress, able to recognize caregiver and sustaining a comfortable appearance. Connected to a cardiac monitor with alarms on , suction system set up and MD by bedside for conscious sedation. Patient to be closely monitored for any adverse reactions.
Patient s/p conscious sedation , remains hemodynamically stable and in no respiratory distress. Patient is able to recognize caregivers , maintain good oxygen saturation at 100% 2 L NC , and able to respond effectively to verbal and tactile stimuli. Patient is able to move upper and lower extremities with no difficulties. Patient to continue to be monitored.

## 2018-12-29 LAB
CULTURE RESULTS: SIGNIFICANT CHANGE UP
SPECIMEN SOURCE: SIGNIFICANT CHANGE UP

## 2019-03-10 ENCOUNTER — EMERGENCY (EMERGENCY)
Facility: HOSPITAL | Age: 34
LOS: 1 days | Discharge: HOME | End: 2019-03-10
Attending: EMERGENCY MEDICINE | Admitting: EMERGENCY MEDICINE
Payer: MEDICARE

## 2019-03-10 VITALS
TEMPERATURE: 98 F | SYSTOLIC BLOOD PRESSURE: 94 MMHG | RESPIRATION RATE: 18 BRPM | DIASTOLIC BLOOD PRESSURE: 77 MMHG | HEART RATE: 53 BPM

## 2019-03-10 VITALS
TEMPERATURE: 97 F | RESPIRATION RATE: 14 BRPM | DIASTOLIC BLOOD PRESSURE: 83 MMHG | SYSTOLIC BLOOD PRESSURE: 115 MMHG | HEART RATE: 89 BPM | OXYGEN SATURATION: 99 %

## 2019-03-10 DIAGNOSIS — Z98.890 OTHER SPECIFIED POSTPROCEDURAL STATES: Chronic | ICD-10-CM

## 2019-03-10 LAB
ALBUMIN SERPL ELPH-MCNC: 3.5 G/DL — SIGNIFICANT CHANGE UP (ref 3.3–5)
ALP SERPL-CCNC: 42 U/L — SIGNIFICANT CHANGE UP (ref 40–120)
ALT FLD-CCNC: 11 U/L — SIGNIFICANT CHANGE UP (ref 4–41)
ANION GAP SERPL CALC-SCNC: 9 MMO/L — SIGNIFICANT CHANGE UP (ref 7–14)
APTT BLD: 33.6 SEC — SIGNIFICANT CHANGE UP (ref 27.5–36.3)
AST SERPL-CCNC: 15 U/L — SIGNIFICANT CHANGE UP (ref 4–40)
BASE EXCESS BLDV CALC-SCNC: 3.9 MMOL/L — SIGNIFICANT CHANGE UP
BASOPHILS # BLD AUTO: 0.02 K/UL — SIGNIFICANT CHANGE UP (ref 0–0.2)
BASOPHILS NFR BLD AUTO: 0.5 % — SIGNIFICANT CHANGE UP (ref 0–2)
BILIRUB SERPL-MCNC: 0.4 MG/DL — SIGNIFICANT CHANGE UP (ref 0.2–1.2)
BLOOD GAS VENOUS - CREATININE: 1.08 MG/DL — SIGNIFICANT CHANGE UP (ref 0.5–1.3)
BUN SERPL-MCNC: 16 MG/DL — SIGNIFICANT CHANGE UP (ref 7–23)
CALCIUM SERPL-MCNC: 8.6 MG/DL — SIGNIFICANT CHANGE UP (ref 8.4–10.5)
CHLORIDE BLDV-SCNC: 106 MMOL/L — SIGNIFICANT CHANGE UP (ref 96–108)
CHLORIDE SERPL-SCNC: 101 MMOL/L — SIGNIFICANT CHANGE UP (ref 98–107)
CO2 SERPL-SCNC: 27 MMOL/L — SIGNIFICANT CHANGE UP (ref 22–31)
CREAT SERPL-MCNC: 1.42 MG/DL — HIGH (ref 0.5–1.3)
EOSINOPHIL # BLD AUTO: 0.09 K/UL — SIGNIFICANT CHANGE UP (ref 0–0.5)
EOSINOPHIL NFR BLD AUTO: 2.5 % — SIGNIFICANT CHANGE UP (ref 0–6)
GAS PNL BLDV: 130 MMOL/L — LOW (ref 136–146)
GLUCOSE BLDV-MCNC: 87 — SIGNIFICANT CHANGE UP (ref 70–99)
GLUCOSE SERPL-MCNC: 91 MG/DL — SIGNIFICANT CHANGE UP (ref 70–99)
HCO3 BLDV-SCNC: 26 MMOL/L — SIGNIFICANT CHANGE UP (ref 20–27)
HCT VFR BLD CALC: 39 % — SIGNIFICANT CHANGE UP (ref 39–50)
HCT VFR BLDV CALC: 37.6 % — LOW (ref 39–51)
HGB BLD-MCNC: 11.9 G/DL — LOW (ref 13–17)
HGB BLDV-MCNC: 12.2 G/DL — LOW (ref 13–17)
IMM GRANULOCYTES NFR BLD AUTO: 0.3 % — SIGNIFICANT CHANGE UP (ref 0–1.5)
INR BLD: 1.24 — HIGH (ref 0.88–1.17)
LACTATE BLDV-MCNC: 1.7 MMOL/L — SIGNIFICANT CHANGE UP (ref 0.5–2)
LYMPHOCYTES # BLD AUTO: 1.98 K/UL — SIGNIFICANT CHANGE UP (ref 1–3.3)
LYMPHOCYTES # BLD AUTO: 54 % — HIGH (ref 13–44)
MCHC RBC-ENTMCNC: 26 PG — LOW (ref 27–34)
MCHC RBC-ENTMCNC: 30.5 % — LOW (ref 32–36)
MCV RBC AUTO: 85.2 FL — SIGNIFICANT CHANGE UP (ref 80–100)
MONOCYTES # BLD AUTO: 0.29 K/UL — SIGNIFICANT CHANGE UP (ref 0–0.9)
MONOCYTES NFR BLD AUTO: 7.9 % — SIGNIFICANT CHANGE UP (ref 2–14)
NEUTROPHILS # BLD AUTO: 1.28 K/UL — LOW (ref 1.8–7.4)
NEUTROPHILS NFR BLD AUTO: 34.8 % — LOW (ref 43–77)
NRBC # FLD: 0 K/UL — LOW (ref 25–125)
PCO2 BLDV: 58 MMHG — HIGH (ref 41–51)
PH BLDV: 7.33 PH — SIGNIFICANT CHANGE UP (ref 7.32–7.43)
PLATELET # BLD AUTO: 212 K/UL — SIGNIFICANT CHANGE UP (ref 150–400)
PMV BLD: 11.2 FL — SIGNIFICANT CHANGE UP (ref 7–13)
PO2 BLDV: 35 MMHG — SIGNIFICANT CHANGE UP (ref 35–40)
POTASSIUM BLDV-SCNC: 5.4 MMOL/L — HIGH (ref 3.4–4.5)
POTASSIUM SERPL-MCNC: 4.7 MMOL/L — SIGNIFICANT CHANGE UP (ref 3.5–5.3)
POTASSIUM SERPL-SCNC: 4.7 MMOL/L — SIGNIFICANT CHANGE UP (ref 3.5–5.3)
PROT SERPL-MCNC: 6 G/DL — SIGNIFICANT CHANGE UP (ref 6–8.3)
PROTHROM AB SERPL-ACNC: 14.2 SEC — HIGH (ref 9.8–13.1)
RBC # BLD: 4.58 M/UL — SIGNIFICANT CHANGE UP (ref 4.2–5.8)
RBC # FLD: 13.8 % — SIGNIFICANT CHANGE UP (ref 10.3–14.5)
SAO2 % BLDV: 56.2 % — LOW (ref 60–85)
SODIUM SERPL-SCNC: 137 MMOL/L — SIGNIFICANT CHANGE UP (ref 135–145)
TROPONIN T, HIGH SENSITIVITY: 6 NG/L — SIGNIFICANT CHANGE UP (ref ?–14)
VALPROATE SERPL-MCNC: 60.6 UG/ML — SIGNIFICANT CHANGE UP (ref 50–100)
WBC # BLD: 3.67 K/UL — LOW (ref 3.8–10.5)
WBC # FLD AUTO: 3.67 K/UL — LOW (ref 3.8–10.5)

## 2019-03-10 PROCEDURE — 70450 CT HEAD/BRAIN W/O DYE: CPT | Mod: 26

## 2019-03-10 PROCEDURE — 99285 EMERGENCY DEPT VISIT HI MDM: CPT | Mod: GC

## 2019-03-10 RX ORDER — SODIUM CHLORIDE 9 MG/ML
1000 INJECTION INTRAMUSCULAR; INTRAVENOUS; SUBCUTANEOUS ONCE
Qty: 0 | Refills: 0 | Status: COMPLETED | OUTPATIENT
Start: 2019-03-10 | End: 2019-03-10

## 2019-03-10 RX ADMIN — SODIUM CHLORIDE 1000 MILLILITER(S): 9 INJECTION INTRAMUSCULAR; INTRAVENOUS; SUBCUTANEOUS at 15:49

## 2019-03-10 NOTE — ED ADULT NURSE REASSESSMENT NOTE - NS ED NURSE REASSESS COMMENT FT1
Pt awaiting Ct read at this time. Pt at baseline mental status as per family. Will continue to monitor.

## 2019-03-10 NOTE — ED PROVIDER NOTE - CLINICAL SUMMARY MEDICAL DECISION MAKING FREE TEXT BOX
33yo M pmhx autism, seizure disorder, afib p/w CC fall, increased lethargy. Concern for seizure vs infectious etiology or possible med interaction. Unclear what medications pt. is on at home, will do med rec with pt family, labs vbg ct head.

## 2019-03-10 NOTE — ED PROVIDER NOTE - ATTENDING CONTRIBUTION TO CARE
agree with resident note  "35yo M pmhx autism, seizure disorder, afib p/w CC fall. Pt. brought in by niece who lives with him, states that last night pt. went to bed earlier and slept more than usual, woke up today at 11AM and she notices his responses were very slow, he proceeded to lean backward and fall, hitting head on floor, she states he was conscious the entire time. Pt. has history of silent seizures."  Now pt at baseline acting appropriately.  Recent missing of AEs per mother.  Now at baseline    PE: speaking stating "metzger elda" and " basketball" which family states what he says when normal; PERRL; CTAB/L; s1 s 2no m/r/g abd soft/NT/ND ext: no edema Neuro: CNs intact 5/5 motor UE and LE; sensation intact

## 2019-03-10 NOTE — ED ADULT TRIAGE NOTE - CHIEF COMPLAINT QUOTE
Pt had syncopal episode at home, fell to floor and hit his head. Hypotensive 80/50 and in AFIB upon EMS arrival . Fingerstick 101 in field as per EMS. PMH seizures, AFIB, HTN, autism.

## 2019-03-10 NOTE — ED PROVIDER NOTE - OBJECTIVE STATEMENT
35yo M pmhx autism, seizure disorder, afib p/w CC fall. Pt. brought in by niece who lives with him, states that last night pt. went to bed earlier and slept more than usual, woke up today at 11AM and she notices his responses were very slow, he proceeded to lean backward and fall, hitting head on floor, she states he was conscious the entire time. Pt. has history of silent seizures. No fevers vomiting diarrhea rash. Pt. denies pain.

## 2019-03-10 NOTE — ED ADULT NURSE NOTE - OBJECTIVE STATEMENT
Pt presents to ED with family stating that patient woke up this morning not acting like himself and lethargic. Pt woke up around 11AM and took his medications then proceeded to fall and hit the back of his head. After the patient fell and hit his head the family called an ambulance. Pt has a PMH of autism, silent seizures, afib (unsure if on blood thinners). At this time patient is lethargic, at times eyes roll to back of head. Patient unable to stay awake. Nonverbal indicators of pain not present. Pt is in afib on the monitor. O2 saturation 99% on room air. No ecchymosis, swelling or bleeding noted from back of head. Skin clean dry and intact. 20g IVL place by EMS, clear and patent. Labs drawn and sent. MD at bedside evaluating patient. Will continue to monitor.

## 2019-03-10 NOTE — ED PROVIDER NOTE - PROGRESS NOTE DETAILS
Pt. caretaker now at bedside, said he has been late to taking his seizure meds lately and when that happens he has a seizure, will cancel CT head and CXR as history is consistent with recent seizure and post ictal state Pt. caretaker now at bedside, said he has been late to taking his seizure meds lately and when that happens he has a seizure, will cancel UA and CXR as history is consistent with recent seizure and post ictal state Pt. at his baseline per caretaker, ready fro HARDY

## 2019-03-10 NOTE — ED PROVIDER NOTE - NSFOLLOWUPINSTRUCTIONS_ED_ALL_ED_FT
FOLLOW UP WITH YOUR NEUROLOGIST THIS WEEK    Return to ED if symptoms worsen or persist. FOLLOW UP WITH NEUROLOGY IN 1-2 DAYS.    RETURN TO ED IF SYMPTOMS PERSIST OR WORSEN.

## 2019-05-18 ENCOUNTER — INPATIENT (INPATIENT)
Facility: HOSPITAL | Age: 34
LOS: 1 days | Discharge: HOME | DRG: 315 | End: 2019-05-20
Attending: INTERNAL MEDICINE | Admitting: INTERNAL MEDICINE
Payer: MEDICARE

## 2019-05-18 VITALS
WEIGHT: 173.06 LBS | HEIGHT: 72 IN | HEART RATE: 85 BPM | SYSTOLIC BLOOD PRESSURE: 93 MMHG | RESPIRATION RATE: 18 BRPM | OXYGEN SATURATION: 98 % | DIASTOLIC BLOOD PRESSURE: 64 MMHG

## 2019-05-18 DIAGNOSIS — I95.9 HYPOTENSION, UNSPECIFIED: ICD-10-CM

## 2019-05-18 DIAGNOSIS — Z98.890 OTHER SPECIFIED POSTPROCEDURAL STATES: Chronic | ICD-10-CM

## 2019-05-18 LAB
ALBUMIN SERPL ELPH-MCNC: 3.8 G/DL — SIGNIFICANT CHANGE UP (ref 3.3–5)
ALP SERPL-CCNC: 44 U/L — SIGNIFICANT CHANGE UP (ref 40–120)
ALT FLD-CCNC: 10 U/L — SIGNIFICANT CHANGE UP (ref 10–45)
ANION GAP SERPL CALC-SCNC: 11 MMOL/L — SIGNIFICANT CHANGE UP (ref 5–17)
APPEARANCE UR: CLEAR — SIGNIFICANT CHANGE UP
APTT BLD: 30.6 SEC — SIGNIFICANT CHANGE UP (ref 27.5–36.3)
AST SERPL-CCNC: 13 U/L — SIGNIFICANT CHANGE UP (ref 10–40)
BACTERIA # UR AUTO: NEGATIVE — SIGNIFICANT CHANGE UP
BASOPHILS # BLD AUTO: 0 K/UL — SIGNIFICANT CHANGE UP (ref 0–0.2)
BASOPHILS NFR BLD AUTO: 0.7 % — SIGNIFICANT CHANGE UP (ref 0–2)
BILIRUB SERPL-MCNC: 1.2 MG/DL — SIGNIFICANT CHANGE UP (ref 0.2–1.2)
BILIRUB UR-MCNC: NEGATIVE — SIGNIFICANT CHANGE UP
BUN SERPL-MCNC: 10 MG/DL — SIGNIFICANT CHANGE UP (ref 7–23)
CALCIUM SERPL-MCNC: 8.9 MG/DL — SIGNIFICANT CHANGE UP (ref 8.4–10.5)
CHLORIDE SERPL-SCNC: 97 MMOL/L — SIGNIFICANT CHANGE UP (ref 96–108)
CO2 SERPL-SCNC: 29 MMOL/L — SIGNIFICANT CHANGE UP (ref 22–31)
COLOR SPEC: YELLOW — SIGNIFICANT CHANGE UP
CREAT SERPL-MCNC: 1.2 MG/DL — SIGNIFICANT CHANGE UP (ref 0.5–1.3)
DIFF PNL FLD: NEGATIVE — SIGNIFICANT CHANGE UP
EOSINOPHIL # BLD AUTO: 0.1 K/UL — SIGNIFICANT CHANGE UP (ref 0–0.5)
EOSINOPHIL NFR BLD AUTO: 1.5 % — SIGNIFICANT CHANGE UP (ref 0–6)
EPI CELLS # UR: 1 /HPF — SIGNIFICANT CHANGE UP
GLUCOSE SERPL-MCNC: 102 MG/DL — HIGH (ref 70–99)
GLUCOSE UR QL: NEGATIVE — SIGNIFICANT CHANGE UP
HCT VFR BLD CALC: 41.1 % — SIGNIFICANT CHANGE UP (ref 39–50)
HGB BLD-MCNC: 13.1 G/DL — SIGNIFICANT CHANGE UP (ref 13–17)
HYALINE CASTS # UR AUTO: 3 /LPF — HIGH (ref 0–2)
INR BLD: 1.2 RATIO — HIGH (ref 0.88–1.16)
KETONES UR-MCNC: NEGATIVE — SIGNIFICANT CHANGE UP
LEUKOCYTE ESTERASE UR-ACNC: NEGATIVE — SIGNIFICANT CHANGE UP
LYMPHOCYTES # BLD AUTO: 2 K/UL — SIGNIFICANT CHANGE UP (ref 1–3.3)
LYMPHOCYTES # BLD AUTO: 29.8 % — SIGNIFICANT CHANGE UP (ref 13–44)
MAGNESIUM SERPL-MCNC: 1.8 MG/DL — SIGNIFICANT CHANGE UP (ref 1.6–2.6)
MCHC RBC-ENTMCNC: 27.5 PG — SIGNIFICANT CHANGE UP (ref 27–34)
MCHC RBC-ENTMCNC: 31.9 GM/DL — LOW (ref 32–36)
MCV RBC AUTO: 86.1 FL — SIGNIFICANT CHANGE UP (ref 80–100)
MONOCYTES # BLD AUTO: 0.4 K/UL — SIGNIFICANT CHANGE UP (ref 0–0.9)
MONOCYTES NFR BLD AUTO: 6.2 % — SIGNIFICANT CHANGE UP (ref 2–14)
NEUTROPHILS # BLD AUTO: 4.1 K/UL — SIGNIFICANT CHANGE UP (ref 1.8–7.4)
NEUTROPHILS NFR BLD AUTO: 61.8 % — SIGNIFICANT CHANGE UP (ref 43–77)
NITRITE UR-MCNC: NEGATIVE — SIGNIFICANT CHANGE UP
PH UR: 6.5 — SIGNIFICANT CHANGE UP (ref 5–8)
PHOSPHATE SERPL-MCNC: 3.9 MG/DL — SIGNIFICANT CHANGE UP (ref 2.5–4.5)
PLATELET # BLD AUTO: 197 K/UL — SIGNIFICANT CHANGE UP (ref 150–400)
POTASSIUM SERPL-MCNC: 3.7 MMOL/L — SIGNIFICANT CHANGE UP (ref 3.5–5.3)
POTASSIUM SERPL-SCNC: 3.7 MMOL/L — SIGNIFICANT CHANGE UP (ref 3.5–5.3)
PROT SERPL-MCNC: 6.2 G/DL — SIGNIFICANT CHANGE UP (ref 6–8.3)
PROT UR-MCNC: SIGNIFICANT CHANGE UP
PROTHROM AB SERPL-ACNC: 13.9 SEC — HIGH (ref 10–12.9)
RBC # BLD: 4.78 M/UL — SIGNIFICANT CHANGE UP (ref 4.2–5.8)
RBC # FLD: 13.2 % — SIGNIFICANT CHANGE UP (ref 10.3–14.5)
RBC CASTS # UR COMP ASSIST: 1 /HPF — SIGNIFICANT CHANGE UP (ref 0–4)
SODIUM SERPL-SCNC: 137 MMOL/L — SIGNIFICANT CHANGE UP (ref 135–145)
SP GR SPEC: 1.02 — SIGNIFICANT CHANGE UP (ref 1.01–1.02)
UROBILINOGEN FLD QL: NEGATIVE — SIGNIFICANT CHANGE UP
VALPROATE SERPL-MCNC: 43 UG/ML — LOW (ref 50–100)
WBC # BLD: 6.7 K/UL — SIGNIFICANT CHANGE UP (ref 3.8–10.5)
WBC # FLD AUTO: 6.7 K/UL — SIGNIFICANT CHANGE UP (ref 3.8–10.5)
WBC UR QL: 1 /HPF — SIGNIFICANT CHANGE UP (ref 0–5)

## 2019-05-18 PROCEDURE — 99285 EMERGENCY DEPT VISIT HI MDM: CPT | Mod: GC,25

## 2019-05-18 PROCEDURE — 93010 ELECTROCARDIOGRAM REPORT: CPT

## 2019-05-18 RX ORDER — SODIUM CHLORIDE 9 MG/ML
1000 INJECTION INTRAMUSCULAR; INTRAVENOUS; SUBCUTANEOUS ONCE
Refills: 0 | Status: COMPLETED | OUTPATIENT
Start: 2019-05-18 | End: 2019-05-18

## 2019-05-18 RX ORDER — DIVALPROEX SODIUM 500 MG/1
750 TABLET, DELAYED RELEASE ORAL AT BEDTIME
Refills: 0 | Status: DISCONTINUED | OUTPATIENT
Start: 2019-05-18 | End: 2019-05-20

## 2019-05-18 RX ORDER — LACOSAMIDE 50 MG/1
50 TABLET ORAL
Refills: 0 | Status: DISCONTINUED | OUTPATIENT
Start: 2019-05-18 | End: 2019-05-20

## 2019-05-18 RX ORDER — METOPROLOL TARTRATE 50 MG
25 TABLET ORAL
Refills: 0 | Status: DISCONTINUED | OUTPATIENT
Start: 2019-05-19 | End: 2019-05-20

## 2019-05-18 RX ORDER — HEPARIN SODIUM 5000 [USP'U]/ML
5000 INJECTION INTRAVENOUS; SUBCUTANEOUS EVERY 12 HOURS
Refills: 0 | Status: DISCONTINUED | OUTPATIENT
Start: 2019-05-18 | End: 2019-05-20

## 2019-05-18 RX ORDER — ONDANSETRON 8 MG/1
4 TABLET, FILM COATED ORAL ONCE
Refills: 0 | Status: DISCONTINUED | OUTPATIENT
Start: 2019-05-18 | End: 2019-05-18

## 2019-05-18 RX ORDER — ACETAMINOPHEN 500 MG
975 TABLET ORAL ONCE
Refills: 0 | Status: DISCONTINUED | OUTPATIENT
Start: 2019-05-18 | End: 2019-05-18

## 2019-05-18 RX ORDER — SODIUM CHLORIDE 9 MG/ML
1000 INJECTION INTRAMUSCULAR; INTRAVENOUS; SUBCUTANEOUS
Refills: 0 | Status: DISCONTINUED | OUTPATIENT
Start: 2019-05-18 | End: 2019-05-20

## 2019-05-18 RX ORDER — DIVALPROEX SODIUM 500 MG/1
750 TABLET, DELAYED RELEASE ORAL
Qty: 0 | Refills: 0 | DISCHARGE

## 2019-05-18 RX ORDER — MORPHINE SULFATE 50 MG/1
2 CAPSULE, EXTENDED RELEASE ORAL ONCE
Refills: 0 | Status: DISCONTINUED | OUTPATIENT
Start: 2019-05-18 | End: 2019-05-18

## 2019-05-18 RX ORDER — CLONAZEPAM 1 MG
2 TABLET ORAL AT BEDTIME
Refills: 0 | Status: DISCONTINUED | OUTPATIENT
Start: 2019-05-18 | End: 2019-05-20

## 2019-05-18 RX ORDER — RISPERIDONE 4 MG/1
2 TABLET ORAL
Refills: 0 | Status: DISCONTINUED | OUTPATIENT
Start: 2019-05-18 | End: 2019-05-20

## 2019-05-18 RX ADMIN — SODIUM CHLORIDE 1000 MILLILITER(S): 9 INJECTION INTRAMUSCULAR; INTRAVENOUS; SUBCUTANEOUS at 11:17

## 2019-05-18 RX ADMIN — Medication 2 MILLIGRAM(S): at 23:18

## 2019-05-18 RX ADMIN — RISPERIDONE 2 MILLIGRAM(S): 4 TABLET ORAL at 19:01

## 2019-05-18 RX ADMIN — SODIUM CHLORIDE 50 MILLILITER(S): 9 INJECTION INTRAMUSCULAR; INTRAVENOUS; SUBCUTANEOUS at 19:35

## 2019-05-18 RX ADMIN — LACOSAMIDE 50 MILLIGRAM(S): 50 TABLET ORAL at 19:01

## 2019-05-18 RX ADMIN — DIVALPROEX SODIUM 750 MILLIGRAM(S): 500 TABLET, DELAYED RELEASE ORAL at 23:27

## 2019-05-18 NOTE — H&P ADULT - ASSESSMENT
34 year old male      with PMHx of autism, seizure visored   on vimpat and klonopin . AFIb,  on asa/ normal ef     admitted with episode of staring spell noticed by the home health aide.     The episode lasted about 1-2 minutes, and then the patient returned back to his baseline (verbal, like to talk about basketball, able to follow some simple commands).       seen by neuro in  er  per neuro, no further  intervention   h/o PAF,   per  card,  Multaq  was  stopped   in er, pt has  Afib   was  hypotensive  in er.  responded   to iv fluids  lopressor dose  decreased  sister wants  neuro care established  here/ to f/p in clinic     < from: Transthoracic Echocardiogram (11.19.18 @ 16:19) >  ONCLUSIONS:  1. Normal mitral valve. Mild mitral regurgitation.  2. Normal left ventricular internal dimensions and wall  thicknesses.  3. Endocardium not well visualized; grossly normal left  ventricular systolic function.  4. Normal right ventricular size and function.  5. Normal tricuspid valve.   Moderate tricuspid  regurgitation.  6.Estimated pulmonary artery systolic pressure equals 47  mm Hg, assuming right atrial pressure equals 10  mm Hg,  consistent with mild pulmonary hypertension.  7. Normal pericardium with small pericardial effusion.  8. Right pleural effusion.    < end of copied text >     < from: CT Head No Cont (03.10.19 @ 17:26) >  IMPRESSION:   Limited study. No CT evidence of acute intracranial hemorrhage, mass   effect, or midline shift.No change 12/28/2018    < end of copied text >                     Plan  [] spoke to ED staff, patient will be admitted to medicine for BP optimization and monitoring  [] would continue current AEDs, it patient has another episode can do rEEG  [] follow up in outpatient epilepsy clinic (need to specify this when making an appointment) [phone]  [] would be cautious with BP meds given episode of hypotension  [] rest of management per primary team

## 2019-05-18 NOTE — ED PROVIDER NOTE - ATTENDING CONTRIBUTION TO CARE
34M, pmh autism, seizure disorder, on vimpat, clonapine, valproic acid, presents s/p two seizure-like episodes. pt with episode of decreased responsiveness x 2, eahc lasted <1 min duration. pt was shweta at [facility] ~1 week ago s/p fall, had labs, ekg, d/c'd with outpatient f/u. Pt with urinary incontinence at baseline. Per sister, no recent falls, head trauma, medication changes. Pt was awake following seizure, but now is more sedated s/p taking morning meds. Sister denies any recent illnesses, f/c, vomiting, diarrhea, cough, or other complaints.    PE: Somnolent but arousable to loud voice/stimulation, NCAT, MMM, Trachea midline, Normal conjunctiva, lungs CTAB, S1/S2 tachycardic, irreg irreg, Normal perfusion, 2+ radial pulses bilat, Abdomen Soft, NTND, No rebound/guarding, Moving extremities x 4.    Pt tachycardic, with soft bp on arrival. Appears post-ictal, also with likely somnolence 2/2 med administration. Typical seizure, but per sister have been increasing in frequency. Check CBC eval for anemia, cmp eval for metabolic derangement. urinalysis. Give IVF. EKG rapid afib. Due to increasing frequency seizures, will obtain neuro consult. To re-eval. - Nasim Alford MD

## 2019-05-18 NOTE — ED PROVIDER NOTE - PHYSICAL EXAMINATION
PHYSICAL EXAM:  General: No acute distress.  HEENT: NCAT.  PERRL.  EOMI.  No scleral icterus or injection.  Moist MM.  No oropharyngeal exudates.    Neck: Supple.  Full ROM.  No JVD.  No thyromegaly. No lymphadenopathy.   Heart: RRR.  Normal S1 and S2.  No murmurs, rubs, or gallops.   Lungs: CTAB. No wheezes, crackles, or rhonchi.    Abdomen: BS+, soft, NT/ND.  No organomegaly.  Skin: Warm and dry.  No rashes.  Extremities: No edema, clubbing, or cyanosis.  2+ peripheral pulses b/l.  Musculoskeletal: No deformities.  No spinal or paraspinal tenderness.  Neuro: A&Ox3.  CN II-XII intact.  5/5 strength in UE and LE b/l.  Tactile sensation intact in UE and LE b/l.  Cerebellar function intact PHYSICAL EXAM:  General: No acute distress.  HEENT: NCAT.  PERRL.  EOMI.  No scleral icterus or injection.    Neck: Supple.  Heart: RRR.  Normal S1 and S2.  No murmurs, rubs, or gallops.   Lungs: CTAB. No wheezes, crackles, or rhonchi.    Abdomen: BS+, soft, NT/ND.  No organomegaly.  Skin: Warm and dry.  No rashes.  Extremities: No edema, clubbing, or cyanosis.   Neuro: A&Ox1 (baseline due to mental hx), 5/5 strength in UE and LE b/l.

## 2019-05-18 NOTE — ED PROVIDER NOTE - NS ED ROS FT
REVIEW OF SYSTEMS:  CONSTITUTIONAL: No weakness, fevers or chills  EYES/ENT: No visual changes;  No vertigo or throat pain   NECK: No pain or stiffness  RESPIRATORY: No cough, wheezing, hemoptysis; No shortness of breath  CARDIOVASCULAR: No chest pain or palpitations  GASTROINTESTINAL: No abdominal pain; nausea, vomiting;  diarrhea or constipation. No hemetemesis, melena or hematochezia.  GENITOURINARY: No dysuria, frequency or hematuria  NEUROLOGICAL: No numbness or weakness  SKIN: No itching, burning, rashes, or lesions   All other review of systems is negative unless indicated above. Abdomen soft, nontender, nondistended, REVIEW OF SYSTEMS:  CONSTITUTIONAL: No fevers or chills  EYES/ENT: No visual changes;  No vertigo or throat pain   NECK: No pain or stiffness  RESPIRATORY: No cough, wheezing, hemoptysis; No shortness of breath  CARDIOVASCULAR: No chest pain or palpitations  GASTROINTESTINAL: No abdominal pain; nausea, vomiting;  diarrhea or constipation. No hemetemesis, melena or hematochezia.  GENITOURINARY: No dysuria, frequency or hematuria  NEUROLOGICAL: no weakness  SKIN: No itching, burning, rashes, or lesions   All other review of systems is negative unless indicated above.

## 2019-05-18 NOTE — CONSULT NOTE ADULT - SUBJECTIVE AND OBJECTIVE BOX
HPI:  Patient is a 34 year old male with PMHx of autism, seizure visorder on vimpat and klonopin who presents to the ED with episode of staring spell noticed by the home health aide. The episode lasted about 1-2 minutes, and then the patient returned back to his baseline (verbal, like to talk about basketball, able to follow some simple commands). family member at bedside states that he also had a seizure 1 week back, no medication changes made at that time. He did take his seizure meds this morning and is compliant. No fecal, urinary incontinence, no post-ictal period. He did follow with an epileptologist before but the family wants to establish care here. The patient was previously cared for by his grandmother, family member at bedside is not sure how many seizures he has per year or when they started. He was also noted to be hypotensive in the ED and appeared tired, but mentation improved after BP improved and he was normotensive.     PAST MEDICAL & SURGICAL HISTORY:  Incisional hernia  Seizures  Afib  Hypertension  Autism spectrum disorder  History of incisional hernia repair: ~03/2018    FAMILY HISTORY:  Hypertension (Mother)    Allergies  No Known Allergies    Review of Systems:  CONSTITUTIONAL:  No weight loss, fever, chills, weakness or fatigue.  HEENT:  Eyes:  No visual loss, blurred vision, double vision or yellow sclerae. Ears, Nose, Throat:  No hearing loss, sneezing, congestion, runny nose or sore throat.  CARDIOVASCULAR:  No chest pain, chest pressure or chest discomfort. No palpitations or edema.  GASTROINTESTINAL:  No anorexia, nausea, vomiting or diarrhea. No abdominal pain or blood.  NEUROLOGICAL: See HPI  MUSCULOSKELETAL:  No muscle, back pain, joint pain or stiffness.  PSYCHIATRIC:  No history of depression or anxiety.    Vital Signs Last 24 Hrs  T(C): 36.6 (18 May 2019 10:55), Max: 36.6 (18 May 2019 10:55)  T(F): 97.9 (18 May 2019 10:55), Max: 97.9 (18 May 2019 10:55)  HR: 98 (18 May 2019 14:34) (85 - 130)  BP: 105/60 (18 May 2019 14:34) (73/59 - 105/68)  BP(mean): 75 (18 May 2019 11:30) (75 - 82)  RR: 16 (18 May 2019 14:34) (12 - 22)  SpO2: 95% (18 May 2019 14:34) (95% - 98%)    General Exam:   General appearance: No acute distress                 Neurological Exam:  Mental Status: Able to say his name, is alert, following simple commands (show fingers)  Cranial Nerves: PERRL, EOMI, VFF. No facial asymmetry.  Hearing intact to finger rub bilaterally.     Motor:   Tone: normal.                  Strength: AG in all extremities   Pronator drift: none                 Dysmetria: BL NL FTN  No truncal ataxia.    Tremor: No resting, postural or action tremor.  No myoclonus.  Sensation: intact to light touch  Toes flexor bilaterally  Gait: deferred

## 2019-05-18 NOTE — H&P ADULT - HISTORY OF PRESENT ILLNESS
HPI:  Patient is a 34 year old male with     PMHx of autism,   seizure disorder   on vimpat and klonopin     who presents to the ED with episode of staring spell noticed by the home health aide.     The episode lasted about 1-2 minutes, and then the patient returned back to his baseline (verbal, like to talk about basketball, able to follow some simple commands).  sister   at bedside states that he also had a seizure 1 week back, no medication changes made at that time.     He did take his seizure meds this morning and is compliant.   No fecal, urinary incontinence, no post-ictal period. He did follow with an epileptologist before but the family wants to establish care here. The patient was previously cared for by his grandmother  , family member at bedside is not sure how many seizures he has per year or when they started.     He was also noted to be hypotensive in the ED and appeared tired, but mentation improved after BP improved and he was normotensive.

## 2019-05-18 NOTE — H&P ADULT - NSHPLABSRESULTS_GEN_ALL_CORE
LABS:                        13.1   6.7   )-----------( 197      ( 18 May 2019 10:54 )             41.1     -18    137  |  97  |  10  ----------------------------<  102<H>  3.7   |  29  |  1.20    Ca    8.9      18 May 2019 10:54  Phos  3.9       Mg     1.8         TPro  6.2  /  Alb  3.8  /  TBili  1.2  /  DBili  x   /  AST  13  /  ALT  10  /  AlkPhos  44      PT/INR - ( 18 May 2019 10:54 )   PT: 13.9 sec;   INR: 1.20 ratio         PTT - ( 18 May 2019 10:54 )  PTT:30.6 sec      Urinalysis Basic - ( 18 May 2019 11:17 )    Color: Yellow / Appearance: Clear / S.017 / pH: x  Gluc: x / Ketone: Negative  / Bili: Negative / Urobili: Negative   Blood: x / Protein: Trace / Nitrite: Negative   Leuk Esterase: Negative / RBC: 1 /hpf / WBC 1 /HPF   Sq Epi: x / Non Sq Epi: 1 /hpf / Bacteria: Negative

## 2019-05-18 NOTE — CONSULT NOTE ADULT - ASSESSMENT
34 year old male with PMHx of autism, seizure visorder on vimpat and klonopin who presents to the ED with episode of staring spell noticed by the home health aide. The episode lasted about 1-2 minutes, and then the patient returned back to his baseline (verbal, like to talk about basketball, able to follow some simple commands). family member at bedside states that he also had a seizure 1 week back, no medication changes made at that time. He did take his seizure meds this morning and is compliant. No fecal, urinary incontinence, no post-ictal period. He did follow with an epileptologist before but the family wants to establish care here. The patient was previously cared for by his grandmother, family member at bedside is not sure how many seizures he has per year or when they started. He was also noted to be hypotensive in the ED and appeared tired, but mentation improved after BP improved and he was normotensive.     Plan  [] spoke to ED staff, patient will be admitted to medicine for BP optimization and monitoring  [] would continue current AEDs, it patient has another episode can do rEEG  [] follow up in outpatient epilepsy clinic (need to specify this when making an appointment) [phone]  [] would be cautious with BP meds given episode of hypotension  [] rest of management per primary team 34 year old male with PMHx of autism, seizure visorder on vimpat and klonopin who presents to the ED with episode of staring spell noticed by the home health aide. The episode lasted about 1-2 minutes, and then the patient returned back to his baseline (verbal, like to talk about basketball, able to follow some simple commands). family member at bedside states that he also had a seizure 1 week back, no medication changes made at that time. He did take his seizure meds this morning and is compliant. No fecal, urinary incontinence, no post-ictal period. He did follow with an epileptologist before but the family wants to establish care here. The patient was previously cared for by his grandmother, family member at bedside is not sure how many seizures he has per year or when they started. He was also noted to be hypotensive in the ED and appeared tired, but mentation improved after BP improved and he was normotensive.     Plan  [] spoke to ED staff, patient will be admitted to medicine for BP optimization and monitoring  [] would continue current AEDs, it patient has another episode can do rEEG  [] follow up in outpatient epilepsy service ([phone], specify epilepsy) or clinic (need to specify this when making an appointment) [phone];  [] would be cautious with BP meds given episode of hypotension  [] rest of management per primary team

## 2019-05-18 NOTE — CONSULT NOTE ADULT - ASSESSMENT
pt with hx of chronic a.fib with seizure activity.  tele  ivf   dc multaq  no ac as per history  awaiting chest xray  will adjust meds

## 2019-05-18 NOTE — CONSULT NOTE ADULT - SUBJECTIVE AND OBJECTIVE BOX
CHIEF COMPLAINT:Patient is a 34y old  Male who presents with a chief complaint of seizures (18 May 2019 17:25)      HPI:     PMHx of autism,   seizure disorder   on vimpat and klonopin     who presents to the ED with episode of staring spell noticed by the home health aide.     The episode lasted about 1-2 minutes, and then the patient returned back to his baseline (verbal, like to talk about basketball, able to follow some simple commands).  sister   at bedside states that he also had a seizure 1 week back, no medication changes made at that time.     He did take his seizure meds this morning and is compliant.   No fecal, urinary incontinence, no post-ictal period. He did follow with an epileptologist before but the family wants to establish care here. The patient was previously cared for by his grandmother  , family member at bedside is not sure how many seizures he has per year or when they started.     He was also noted to be hypotensive in the ED and appeared tired, but mentation improved after BP improved and he was normotensive. (18 May 2019 17:25)      PAST MEDICAL & SURGICAL HISTORY:  Incisional hernia  Seizures  Afib  Hypertension  Autism spectrum disorder  History of incisional hernia repair: ~03/2018      MEDICATIONS  (STANDING):  clonazePAM  Tablet 2 milliGRAM(s) Oral at bedtime  diVALproex  milliGRAM(s) Oral at bedtime  lacosamide 50 milliGRAM(s) Oral two times a day  risperiDONE   Tablet 2 milliGRAM(s) Oral two times a day  sodium chloride 0.9%. 1000 milliLiter(s) (50 mL/Hr) IV Continuous <Continuous>    MEDICATIONS  (PRN):      FAMILY HISTORY:  Hypertension      SOCIAL HISTORY:    [ ] Non-smoker  [ ] Smoker  [ ] Alcohol    Allergies    No Known Allergies    Intolerances    	    REVIEW OF SYSTEMS:  CONSTITUTIONAL: No fever, weight loss, or fatigue  EYES: No eye pain, visual disturbances, or discharge  ENT:  No difficulty hearing, tinnitus, vertigo; No sinus or throat pain  NECK: No pain or stiffness  RESPIRATORY: No cough, wheezing, chills or hemoptysis; No Shortness of Breath  CARDIOVASCULAR: No chest pain, palpitations, passing out, dizziness, or leg swelling  GASTROINTESTINAL: No abdominal or epigastric pain. No nausea, vomiting, or hematemesis; No diarrhea or constipation. No melena or hematochezia.  GENITOURINARY: No dysuria, frequency, hematuria, or incontinence  NEUROLOGICAL: No headaches, memory loss, loss of strength, numbness, or tremors  SKIN: No itching, burning, rashes, or lesions   LYMPH Nodes: No enlarged glands  ENDOCRINE: No heat or cold intolerance; No hair loss  MUSCULOSKELETAL: No joint pain or swelling; No muscle, back, or extremity pain  PSYCHIATRIC: No depression, anxiety, mood swings, or difficulty sleeping  HEME/LYMPH: No easy bruising, or bleeding gums  ALLERGY AND IMMUNOLOGIC: No hives or eczema	    [ ] All others negative	  [ ] Unable to obtain    PHYSICAL EXAM:  T(C): 36.1 (05-18-19 @ 16:54), Max: 36.6 (05-18-19 @ 10:55)  HR: 95 (05-18-19 @ 17:55) (85 - 130)  BP: 96/61 (05-18-19 @ 17:55) (73/59 - 105/68)  RR: 18 (05-18-19 @ 17:55) (12 - 22)  SpO2: 96% (05-18-19 @ 17:55) (95% - 99%)  Wt(kg): --  I&O's Summary      Appearance: Normal	  HEENT:   Normal oral mucosa, PERRL, EOMI	  Lymphatic: No lymphadenopathy  Cardiovascular: Normal S1 S2, No JVD, + murmurs, No edema  Respiratory: Lungs clear to auscultation	  Psychiatry: A & O x 3, Mood & affect appropriate  Gastrointestinal:  Soft, Non-tender, + BS	  Skin: No rashes, No ecchymoses, No cyanosis	  Neurologic: Non-focal  Extremities: Normal range of motion, No clubbing, cyanosis or edema  Vascular: Peripheral pulses palpable 2+ bilaterally    TELEMETRY: 	    ECG:  	  RADIOLOGY:  OTHER: 	  	  LABS:	 	    CARDIAC MARKERS:                              13.1   6.7   )-----------( 197      ( 18 May 2019 10:54 )             41.1     05-18    137  |  97  |  10  ----------------------------<  102<H>  3.7   |  29  |  1.20    Ca    8.9      18 May 2019 10:54  Phos  3.9     05-18  Mg     1.8     05-18    TPro  6.2  /  Alb  3.8  /  TBili  1.2  /  DBili  x   /  AST  13  /  ALT  10  /  AlkPhos  44  05-18    proBNP:   Lipid Profile:   HgA1c:   TSH:   PT/INR - ( 18 May 2019 10:54 )   PT: 13.9 sec;   INR: 1.20 ratio         PTT - ( 18 May 2019 10:54 )  PTT:30.6 sec    PREVIOUS DIAGNOSTIC TESTING:    < from: Transthoracic Echocardiogram (11.19.18 @ 16:19) >  1. Normal mitral valve. Mild mitral regurgitation.  2. Normal left ventricular internal dimensions and wall  thicknesses.  3. Endocardium not well visualized; grossly normal left  ventricular systolic function.  4. Normal right ventricular size and function.  5. Normal tricuspid valve.   Moderate tricuspid  regurgitation.  6.Estimated pulmonary artery systolic pressure equals 47  mm Hg, assuming right atrial pressure equals 10  mm Hg,  consistent with mild pulmonary hypertension.  7. Normal pericardium with small pericardial effusion.  8. Right pleural effusion.    < end of copied text >      < from: CT Head No Cont (03.10.19 @ 17:26) >  Limited study. No CT evidence of acute intracranial hemorrhage, mass   effect, or midline shift.No change 12/28/2018    < from: Xray Chest 1 View-PORTABLE IMMEDIATE (12.28.18 @ 14:20) >  Negative for acute cardiopulmonary process.    < from: 12 Lead ECG (05.18.19 @ 10:39) >  Diagnosis Line ATRIAL FIBRILLATION WITH RAPID VENTRICULAR RESPONSE  NONSPECIFIC T WAVE ABNORMALITY  ABNORMAL ECG    < end of copied text >

## 2019-05-18 NOTE — ED ADULT NURSE REASSESSMENT NOTE - NS ED NURSE REASSESS COMMENT FT1
pt continues to rest comfortably in bed, sleep and responding to verbal and painful stimuli.   door open and moved close to nurses station for enhanced supervision.   vital signs remain stable. side rails up for safety.

## 2019-05-18 NOTE — ED ADULT NURSE NOTE - NSIMPLEMENTINTERV_GEN_ALL_ED
Implemented All Fall Risk Interventions:  [place] to call system. Call bell, personal items and telephone within reach. Instruct patient to call for assistance. Room bathroom lighting operational. Non-slip footwear when patient is off stretcher. Physically safe environment: no spills, clutter or unnecessary equipment. Stretcher in lowest position, wheels locked, appropriate side rails in place. Provide visual cue, wrist band, yellow gown, etc. Monitor gait and stability. Monitor for mental status changes and reorient to person, place, and time. Review medications for side effects contributing to fall risk. Reinforce activity limits and safety measures with patient and family.

## 2019-05-18 NOTE — ED PROVIDER NOTE - OBJECTIVE STATEMENT
Pt is a 34yM with a pmh of autism, seizures, on ... came in d/t Pt is a 34yM with a pmh of autism, seizures, on vimpat and clonapine, takes his meds in the morning 9am and at night 9pm. Came in d/t two episodes of seizures; silent seizure <1min in duration for both this morning. Last seizure before this was 1 week back in which he also had an associated fall. No changes to his seizure medications were made. He took his night dose yesterday, and morning dose this morning. Seizure was reported to be what he normally gets--absent seizure, where he did not respond to verbal, touch stimulus, w/ no twitching or muscle spasms. The two seizures were only a couple minutes apart. Denies urinary, fecal incontinence. No witness fall, or trauma. Is awake now, but a little drowsy--which sister states is normal after his takes his morning medications. His baseline is answering questions about his interest in basketball, and repeating his interest in [employer], which he is currently doing.

## 2019-05-18 NOTE — H&P ADULT - NSHPPHYSICALEXAM_GEN_ALL_CORE
PHYSICAL EXAMINATION:  Vital Signs Last 24 Hrs  T(C): 36.1 (18 May 2019 16:54), Max: 36.6 (18 May 2019 10:55)  T(F): 97 (18 May 2019 16:54), Max: 97.9 (18 May 2019 10:55)  HR: 105 (18 May 2019 16:54) (85 - 130)  BP: 102/83 (18 May 2019 16:29) (73/59 - 105/68)  BP(mean): 75 (18 May 2019 11:30) (75 - 82)  RR: 17 (18 May 2019 16:29) (12 - 22)  SpO2: 99% (18 May 2019 16:29) (95% - 99%)  CAPILLARY BLOOD GLUCOSE      POCT Blood Glucose.: 99 mg/dL (18 May 2019 10:46)        GENERAL: NAD, well-groomed,  HEAD:  atraumatic, normocephalic  EYES: sclera anicteric  ENMT: mucous membranes moist  NECK: supple, No JVD  CHEST/LUNG: clear to auscultation bilaterally;    no      rales   ,   no rhonchi,   HEART: normal S1, S2  ABDOMEN: BS+, soft, ND, NT   EXTREMITIES:    no    edema    b/l LEs  NEURO: awake, ,     moves all extremities  non verbal/  able to move limbs, when  asked  by family  SKIN: no     rash

## 2019-05-18 NOTE — ED ADULT NURSE NOTE - OBJECTIVE STATEMENT
34 y.o male brought in by ems from home sp two seizures this morning witnessed by home health aide. hx of seizure disorder, mood disorder, htn, afib, autism. sister states he had an episode of being non verbal this morning which is his seizure presentation, it happened for about one minute and then a second episode followed. After these two episodes, pt was awake and speaking about basketball. Home health aide administered all home medications early and seizure medications, which he usually receives at 9am. Pt presents responding to pain, hypotensive, opening his eyes only when called upon and shouted at. two large bore IVs placed, Afib on cardiac monitor, sister states this is his baseline. She says that after he receives his medication and after he has a seizure this is how he responds. states on the ambulance ride here he was alert. Pt changed, no signs of deformities or injury. Pt able to state his first name and say his favorite , sister states this is his baseline. pt continues to sleep with stable vital signs in no respiratory distress. Lung sounds clear bilaterally abdomen soft ntnd rectal temperature performed and documented. Pt straight cathed using sterile technique.  2 RNs present.  Pt tolerated procedure well. Sterile specimen collected. UA and Culture sent.     Patient undressed and placed into gown, call bell in hand and side rails up for safety. warm blanket provided, vital signs stable, pt in no acute distress.

## 2019-05-18 NOTE — ED PROVIDER NOTE - PROGRESS NOTE DETAILS
pt hypotensive, but afebrile. somnolent, but arousable to stimulation. per sister at bedside, this is typical for him after he receives his meds. additionally, she states during last visit he had labile blood pressure and hypotension. hypotension likely 2/2 medication administration, post-ictal state. giving ivf, pt responding to ivf administration. will monitor closely. - Nasim Alford MD attempted to reach cardiologist to discuss bp med dosing given profound hypotension, unable to reach. given significant hypotension, difficulty waking earlier (unlikely only due to post-ictal state), to admit to medicine for medication titration, observation. - Nasim Alford MD

## 2019-05-18 NOTE — PATIENT PROFILE ADULT - NSPROCHRONICPAIN_GEN_A_NUR
Patient Seen in: [facility] END    History   Patient presents with:  Sore Throat    Stated Complaint: sore throat x 1 wk    HPI    19-year-old female presents for persistent sore throat, neck pain and weakness.   She was seen here 1 800-160 MG Oral Tab per tablet,  Take 1 tablet by mouth 2 (two) times daily. History reviewed. No pertinent family history.       Smoking Status: Former Smoker                   Packs/Day: 0.00  Years:         Comment: quit at age 30    Alcohol Use: N Psychiatric: She has a normal mood and affect.  Her behavior is normal. Judgment and thought content normal.       ED Course     Labs Reviewed   COMP METABOLIC PANEL (14) - Abnormal; Notable for the following:     GFR 53 (*)     Albumin 3.4 (*)     All ot no

## 2019-05-18 NOTE — H&P ADULT - NSICDXPASTMEDICALHX_GEN_ALL_CORE_FT
PAST MEDICAL HISTORY:  Afib     Autism spectrum disorder     Hypertension     Incisional hernia     Seizures

## 2019-05-18 NOTE — CONSULT NOTE ADULT - ATTENDING COMMENTS
Patient seen and examined. Known seizures for at least the past year, for which he is at risk. The family is interested in getting him to see an epilepsy specialist, with which I agree. No need for urgent monitoring unless he has recurrent episodes; neurologically he can be discharged once medically stable.

## 2019-05-19 LAB
CULTURE RESULTS: NO GROWTH — SIGNIFICANT CHANGE UP
SPECIMEN SOURCE: SIGNIFICANT CHANGE UP

## 2019-05-19 PROCEDURE — 99223 1ST HOSP IP/OBS HIGH 75: CPT

## 2019-05-19 PROCEDURE — 71045 X-RAY EXAM CHEST 1 VIEW: CPT | Mod: 26

## 2019-05-19 RX ADMIN — DIVALPROEX SODIUM 750 MILLIGRAM(S): 500 TABLET, DELAYED RELEASE ORAL at 21:18

## 2019-05-19 RX ADMIN — RISPERIDONE 2 MILLIGRAM(S): 4 TABLET ORAL at 05:09

## 2019-05-19 RX ADMIN — Medication 25 MILLIGRAM(S): at 17:32

## 2019-05-19 RX ADMIN — LACOSAMIDE 50 MILLIGRAM(S): 50 TABLET ORAL at 17:32

## 2019-05-19 RX ADMIN — SODIUM CHLORIDE 50 MILLILITER(S): 9 INJECTION INTRAMUSCULAR; INTRAVENOUS; SUBCUTANEOUS at 05:08

## 2019-05-19 RX ADMIN — SODIUM CHLORIDE 50 MILLILITER(S): 9 INJECTION INTRAMUSCULAR; INTRAVENOUS; SUBCUTANEOUS at 17:32

## 2019-05-19 RX ADMIN — LACOSAMIDE 50 MILLIGRAM(S): 50 TABLET ORAL at 05:09

## 2019-05-19 RX ADMIN — Medication 2 MILLIGRAM(S): at 21:18

## 2019-05-19 RX ADMIN — HEPARIN SODIUM 5000 UNIT(S): 5000 INJECTION INTRAVENOUS; SUBCUTANEOUS at 05:09

## 2019-05-19 RX ADMIN — HEPARIN SODIUM 5000 UNIT(S): 5000 INJECTION INTRAVENOUS; SUBCUTANEOUS at 17:31

## 2019-05-19 RX ADMIN — RISPERIDONE 2 MILLIGRAM(S): 4 TABLET ORAL at 17:32

## 2019-05-19 RX ADMIN — SODIUM CHLORIDE 50 MILLILITER(S): 9 INJECTION INTRAMUSCULAR; INTRAVENOUS; SUBCUTANEOUS at 19:30

## 2019-05-19 NOTE — PROGRESS NOTE ADULT - SUBJECTIVE AND OBJECTIVE BOX
CARDIOLOGY     PROGRESS  NOTE   ________________________________________________    CHIEF COMPLAINT:Patient is a 34y old  Male who presents with a chief complaint of seizures (18 May 2019 18:02)  no complain.  	  REVIEW OF SYSTEMS:  CONSTITUTIONAL: No fever, weight loss, or fatigue  EYES: No eye pain, visual disturbances, or discharge  ENT:  No difficulty hearing, tinnitus, vertigo; No sinus or throat pain  NECK: No pain or stiffness  RESPIRATORY: No cough, wheezing, chills or hemoptysis; No Shortness of Breath  CARDIOVASCULAR: No chest pain, palpitations, passing out, dizziness, or leg swelling  GASTROINTESTINAL: No abdominal or epigastric pain. No nausea, vomiting, or hematemesis; No diarrhea or constipation. No melena or hematochezia.  GENITOURINARY: No dysuria, frequency, hematuria, or incontinence  NEUROLOGICAL: No headaches, memory loss, loss of strength, numbness, or tremors  SKIN: No itching, burning, rashes, or lesions   LYMPH Nodes: No enlarged glands  ENDOCRINE: No heat or cold intolerance; No hair loss  MUSCULOSKELETAL: No joint pain or swelling; No muscle, back, or extremity pain  PSYCHIATRIC: No depression, anxiety, mood swings, or difficulty sleeping  HEME/LYMPH: No easy bruising, or bleeding gums  ALLERGY AND IMMUNOLOGIC: No hives or eczema	    [ ] All others negative	  [x ] Unable to obtain    PHYSICAL EXAM:  T(C): 36.4 (05-19-19 @ 05:15), Max: 36.6 (05-18-19 @ 10:55)  HR: 82 (05-19-19 @ 05:15) (75 - 130)  BP: 106/76 (05-19-19 @ 05:15) (73/59 - 132/96)  RR: 19 (05-19-19 @ 05:15) (12 - 22)  SpO2: 96% (05-19-19 @ 05:15) (95% - 99%)  Wt(kg): --  I&O's Summary    18 May 2019 07:01  -  19 May 2019 07:00  --------------------------------------------------------  IN: 640 mL / OUT: 0 mL / NET: 640 mL        Appearance: Normal	  HEENT:   Normal oral mucosa, PERRL, EOMI	  Lymphatic: No lymphadenopathy  Cardiovascular: Normal S1 S2, No JVD, + murmurs, No edema  Respiratory: Lungs clear to auscultation	  Psychiatry: A & O x 3, Mood & affect appropriate  Gastrointestinal:  Soft, Non-tender, + BS	  Skin: No rashes, No ecchymoses, No cyanosis	  Neurologic: Non-focal  Extremities: Normal range of motion, No clubbing, cyanosis or edema  Vascular: Peripheral pulses palpable 2+ bilaterally    MEDICATIONS  (STANDING):  clonazePAM  Tablet 2 milliGRAM(s) Oral at bedtime  diVALproex  milliGRAM(s) Oral at bedtime  heparin  Injectable 5000 Unit(s) SubCutaneous every 12 hours  lacosamide 50 milliGRAM(s) Oral two times a day  metoprolol tartrate 25 milliGRAM(s) Oral two times a day  risperiDONE   Tablet 2 milliGRAM(s) Oral two times a day  sodium chloride 0.9%. 1000 milliLiter(s) (50 mL/Hr) IV Continuous <Continuous>      TELEMETRY: 	    ECG:  	  RADIOLOGY:  OTHER: 	  	  LABS:	 	    CARDIAC MARKERS:                                13.1   6.7   )-----------( 197      ( 18 May 2019 10:54 )             41.1     05-18    137  |  97  |  10  ----------------------------<  102<H>  3.7   |  29  |  1.20    Ca    8.9      18 May 2019 10:54  Phos  3.9     05-18  Mg     1.8     05-18    TPro  6.2  /  Alb  3.8  /  TBili  1.2  /  DBili  x   /  AST  13  /  ALT  10  /  AlkPhos  44  05-18    proBNP:   Lipid Profile:   HgA1c:   TSH:   PT/INR - ( 18 May 2019 10:54 )   PT: 13.9 sec;   INR: 1.20 ratio         PTT - ( 18 May 2019 10:54 )  PTT:30.6 sec      Assessment and plan  ---------------------------  a.fib i dont know why pt is not on tele  continue beta blocker  seizure activity as per neuro  dvt prophylaxis  not ac candidate  multaq dc sec to hx of chronic a.fib  asa daily

## 2019-05-19 NOTE — PROVIDER CONTACT NOTE (OTHER) - ASSESSMENT
patient is neurologically stable, vital signs are stable. Sister is at bedside. No seizure events reported or witnessed at this time.

## 2019-05-19 NOTE — PROVIDER CONTACT NOTE (OTHER) - SITUATION
Patient has an order for telemetry monitoring; however, there isn't any telemetry boxes available at this time

## 2019-05-19 NOTE — PROGRESS NOTE ADULT - ASSESSMENT
34 year old male      with PMHx of autism, seizure visored   on vimpat and klonopin . AFIb,  on asa/ normal ef     admitted with episode of staring spell noticed by the home health aide.     The episode lasted about 1-2 minutes, and then the patient returned back to his baseline (verbal, like to talk about basketball, able to follow some simple commands).       seen by neuro in  er  per neuro, no further  intervention   h/o PAF,   per  card,  Multaq  was  stopped   in er, pt has  Afib   was  hypotensive  in er.  responded   to iv fluids  lopressor dose  decreased  sister wants  neuro care established  here/ to f/p in clinic  pt stable/  valproic  level noted     < from: Transthoracic Echocardiogram (11.19.18 @ 16:19) >  ONCLUSIONS:  1. Normal mitral valve. Mild mitral regurgitation.  2. Normal left ventricular internal dimensions and wall  thicknesses.  3. Endocardium not well visualized; grossly normal left  ventricular systolic function.  4. Normal right ventricular size and function.  5. Normal tricuspid valve.   Moderate tricuspid  regurgitation.  6.Estimated pulmonary artery systolic pressure equals 47  mm Hg, assuming right atrial pressure equals 10  mm Hg,  consistent with mild pulmonary hypertension.  7. Normal pericardium with small pericardial effusion.  8. Right pleural effusion.    < end of copied text >     < from: CT Head No Cont (03.10.19 @ 17:26) >  IMPRESSION:   Limited study. No CT evidence of acute intracranial hemorrhage, mass   effect, or midline shift.No change 12/28/2018    < end of copied text >                     Plan  [] spoke to ED staff, patient will be admitted to medicine for BP optimization and monitoring  [] would continue current AEDs, it patient has another episode can do rEEG  [] follow up in outpatient epilepsy clinic (need to specify this when making an appointment) [phone]  [] would be cautious with BP meds given episode of hypotension  [] rest of management per primary team

## 2019-05-20 ENCOUNTER — TRANSCRIPTION ENCOUNTER (OUTPATIENT)
Age: 34
End: 2019-05-20

## 2019-05-20 VITALS
HEART RATE: 63 BPM | TEMPERATURE: 98 F | RESPIRATION RATE: 16 BRPM | OXYGEN SATURATION: 95 % | SYSTOLIC BLOOD PRESSURE: 155 MMHG | DIASTOLIC BLOOD PRESSURE: 72 MMHG

## 2019-05-20 PROCEDURE — 85027 COMPLETE CBC AUTOMATED: CPT

## 2019-05-20 PROCEDURE — 84100 ASSAY OF PHOSPHORUS: CPT

## 2019-05-20 PROCEDURE — 83735 ASSAY OF MAGNESIUM: CPT

## 2019-05-20 PROCEDURE — 81001 URINALYSIS AUTO W/SCOPE: CPT

## 2019-05-20 PROCEDURE — 80053 COMPREHEN METABOLIC PANEL: CPT

## 2019-05-20 PROCEDURE — 82962 GLUCOSE BLOOD TEST: CPT

## 2019-05-20 PROCEDURE — 93005 ELECTROCARDIOGRAM TRACING: CPT | Mod: XU

## 2019-05-20 PROCEDURE — 51701 INSERT BLADDER CATHETER: CPT

## 2019-05-20 PROCEDURE — 71045 X-RAY EXAM CHEST 1 VIEW: CPT

## 2019-05-20 PROCEDURE — 99285 EMERGENCY DEPT VISIT HI MDM: CPT | Mod: 25

## 2019-05-20 PROCEDURE — 85610 PROTHROMBIN TIME: CPT

## 2019-05-20 PROCEDURE — 85730 THROMBOPLASTIN TIME PARTIAL: CPT

## 2019-05-20 PROCEDURE — 80164 ASSAY DIPROPYLACETIC ACD TOT: CPT

## 2019-05-20 PROCEDURE — 87086 URINE CULTURE/COLONY COUNT: CPT

## 2019-05-20 RX ORDER — LACOSAMIDE 50 MG/1
1 TABLET ORAL
Qty: 28 | Refills: 0
Start: 2019-05-20 | End: 2019-06-02

## 2019-05-20 RX ORDER — METOPROLOL TARTRATE 50 MG
1 TABLET ORAL
Qty: 28 | Refills: 0
Start: 2019-05-20 | End: 2019-06-02

## 2019-05-20 RX ORDER — LACOSAMIDE 50 MG/1
1 TABLET ORAL
Qty: 0 | Refills: 0 | DISCHARGE
Start: 2019-05-20

## 2019-05-20 RX ORDER — METOPROLOL TARTRATE 50 MG
1 TABLET ORAL
Qty: 0 | Refills: 0 | DISCHARGE

## 2019-05-20 RX ADMIN — HEPARIN SODIUM 5000 UNIT(S): 5000 INJECTION INTRAVENOUS; SUBCUTANEOUS at 05:38

## 2019-05-20 RX ADMIN — LACOSAMIDE 50 MILLIGRAM(S): 50 TABLET ORAL at 05:39

## 2019-05-20 RX ADMIN — RISPERIDONE 2 MILLIGRAM(S): 4 TABLET ORAL at 05:39

## 2019-05-20 RX ADMIN — Medication 25 MILLIGRAM(S): at 05:38

## 2019-05-20 NOTE — PROGRESS NOTE ADULT - SUBJECTIVE AND OBJECTIVE BOX
no cp/sob  REVIEW OF SYSTEMS:  GEN: no fever,    no chills  RESP: no SOB,   no cough  CVS: no chest pain,   no palpitations  GI: no abdominal pain,   no nausea,   no vomiting,   no constipation,   no diarrhea  : no dysuria,   no frequency  NEURO: no headache,   no dizziness  PSYCH: no depression,   not anxious  Derm : no rash    MEDICATIONS  (STANDING):  clonazePAM  Tablet 2 milliGRAM(s) Oral at bedtime  diVALproex  milliGRAM(s) Oral at bedtime  heparin  Injectable 5000 Unit(s) SubCutaneous every 12 hours  lacosamide 50 milliGRAM(s) Oral two times a day  metoprolol tartrate 25 milliGRAM(s) Oral two times a day  risperiDONE   Tablet 2 milliGRAM(s) Oral two times a day    MEDICATIONS  (PRN):      Vital Signs Last 24 Hrs  T(C): 36.3 (20 May 2019 08:04), Max: 36.9 (19 May 2019 20:17)  T(F): 97.3 (20 May 2019 08:04), Max: 98.4 (19 May 2019 20:17)  HR: 63 (20 May 2019 08:04) (63 - 111)  BP: 138/84 (20 May 2019 08:04) (108/71 - 138/84)  BP(mean): --  RR: 18 (20 May 2019 08:04) (18 - 20)  SpO2: 99% (20 May 2019 08:04) (94% - 99%)  CAPILLARY BLOOD GLUCOSE        I&O's Summary    19 May 2019 07:01  -  20 May 2019 07:00  --------------------------------------------------------  IN: 300 mL / OUT: 0 mL / NET: 300 mL        PHYSICAL EXAM:  HEAD:  Atraumatic, Normocephalic  NECK: Supple, No   JVD  CHEST/LUNG:   no     rales,     no,    rhonchi  HEART: Regular rate and rhythm;         murmur  ABDOMEN: Soft, Nontender, ;   EXTREMITIES:      no  edema  NEUROLOGY:  alert    LABS:                        13.1   6.7   )-----------( 197      ( 18 May 2019 10:54 )             41.1     05-18    137  |  97  |  10  ----------------------------<  102<H>  3.7   |  29  |  1.20    Ca    8.9      18 May 2019 10:54  Phos  3.9       Mg     1.8         TPro  6.2  /  Alb  3.8  /  TBili  1.2  /  DBili  x   /  AST  13  /  ALT  10  /  AlkPhos  44      PT/INR - ( 18 May 2019 10:54 )   PT: 13.9 sec;   INR: 1.20 ratio         PTT - ( 18 May 2019 10:54 )  PTT:30.6 sec      Urinalysis Basic - ( 18 May 2019 11:17 )    Color: Yellow / Appearance: Clear / S.017 / pH: x  Gluc: x / Ketone: Negative  / Bili: Negative / Urobili: Negative   Blood: x / Protein: Trace / Nitrite: Negative   Leuk Esterase: Negative / RBC: 1 /hpf / WBC 1 /HPF   Sq Epi: x / Non Sq Epi: 1 /hpf / Bacteria: Negative                      Consultant(s) Notes Reviewed:      Care Discussed with Consultants/Other Providers:

## 2019-05-20 NOTE — DISCHARGE NOTE PROVIDER - NSDCCPCAREPLAN_GEN_ALL_CORE_FT
PRINCIPAL DISCHARGE DIAGNOSIS  Diagnosis: Hypotension  Assessment and Plan of Treatment: Continue with medications as prescribed by your doctor.  Maintain safe environment.  Maintain adequate hydration, nutrition, rest, and activity as tolerated.  Follow-up with your primary care physician within 1 week. Call for appointment.        SECONDARY DISCHARGE DIAGNOSES  Diagnosis: Seizure  Assessment and Plan of Treatment: Continue with medications as prescribed by your doctor.  Maintain safe environment.  Follow-up with your primary care physician within 1 week. Call for appointment.  Follow up in outpatient epilepsy service ([phone], specify epilepsy) or clinic (need to specify this when making an appointment) [phone]; PRINCIPAL DISCHARGE DIAGNOSIS  Diagnosis: Hypotension  Assessment and Plan of Treatment: Continue with medications as prescribed by your doctor.  Maintain safe environment.  Maintain adequate hydration, nutrition, rest, and activity as tolerated.  Follow-up with your primary care physician within 1 week. Call for appointment.        SECONDARY DISCHARGE DIAGNOSES  Diagnosis: Seizure  Assessment and Plan of Treatment: Continue with medications as prescribed by your doctor.  Maintain safe environment.  Follow-up with your primary care physician within 1 week. Call for appointment.  Follow up in outpatient epilepsy service ([phone], specify epilepsy) or clinic (need to specify this when making an appointment) [phone];      Diagnosis: HTN (hypertension)  Assessment and Plan of Treatment:   Low salt diet  Activity as tolerated.  Take all medication as prescribed.  Follow up with your medical doctor for routine blood pressure monitoring at your next visit.  Notify your doctor if you have any of the following symptoms:   Dizziness, Lightheadedness, Blurry vision, Headache, Chest pain, Shortness of breath  Follow-up with your primary care physician within 1 week. Call for appointment.      Diagnosis: Atrial fibrillation  Assessment and Plan of Treatment: Atrial fibrillation is the most common heart rhythm problem & has the risk of stroke & heart attack  It helps if you control your blood pressure, not drink more than 1-2 alcohol drinks per day, cut down on caffeine, getting treatment for over active thyroid gland, & getting exercise  Call your doctor if you feel your heart racing or beating unusually, chest tightness or pain, lightheaded, faint, shortness of breath especially with exercise  It is important to take your heart medication as prescribed  You may be on anticoagulation which is very important to take as directed - you may need blood work to monitor drug levels  Follow-up with your primary care physician within 1 week. Call for appointment.

## 2019-05-20 NOTE — DISCHARGE NOTE NURSING/CASE MANAGEMENT/SOCIAL WORK - NSDCDPATPORTLINK_GEN_ALL_CORE
You can access the [facility] Patient Portal, offered by [facility], by registering with the following website: [email or web address] [email or web address]

## 2019-05-20 NOTE — PROGRESS NOTE ADULT - ASSESSMENT
34 year old male      with PMHx of autism, seizure visored   on vimpat and klonopin . AFIb,  on asa/ normal ef     admitted with episode of staring spell noticed by the home health aide.     The episode lasted about 1-2 minutes, and then the patient returned back to his baseline (verbal, like to talk about basketball, able to follow some simple commands).       seen by neuro in  er  per neuro, no further  intervention   h/o PAF,   per  card,  Multaq  was  stopped   in er, pt has  Afib   was  hypotensive  in er.  responded   to iv fluids  lopressor dose  decreased  sister wants  neuro care established  here/ to f/p in clinic  pt stable/  valproic  level noted  cleared for d/c by card  plan. dc  home today with f/p  in epilepsy clinic here     < from: Transthoracic Echocardiogram (11.19.18 @ 16:19) >  ONCLUSIONS:  1. Normal mitral valve. Mild mitral regurgitation.  2. Normal left ventricular internal dimensions and wall  thicknesses.  3. Endocardium not well visualized; grossly normal left  ventricular systolic function.  4. Normal right ventricular size and function.  5. Normal tricuspid valve.   Moderate tricuspid  regurgitation.  6.Estimated pulmonary artery systolic pressure equals 47  mm Hg, assuming right atrial pressure equals 10  mm Hg,  consistent with mild pulmonary hypertension.  7. Normal pericardium with small pericardial effusion.  8. Right pleural effusion.    < end of copied text >     < from: CT Head No Cont (03.10.19 @ 17:26) >  IMPRESSION:   Limited study. No CT evidence of acute intracranial hemorrhage, mass   effect, or midline shift.No change 12/28/2018    < end of copied text >                     Plan  [] spoke to ED staff, patient will be admitted to medicine for BP optimization and monitoring  [] would continue current AEDs, it patient has another episode can do rEEG  [] follow up in outpatient epilepsy clinic (need to specify this when making an appointment) [phone]  [] would be cautious with BP meds given episode of hypotension  [] rest of management per primary team 34 year old male      with PMHx of autism, seizure visored   on vimpat and klonopin . AFIb,  on asa/ normal ef     admitted with episode of staring spell noticed by the home health aide.     The episode lasted about 1-2 minutes, and then the patient returned back to his baseline (verbal, like to talk about basketball, able to follow some simple commands).       seen by neuro in  er  per neuro, no further  intervention   h/o PAF,   per  card,  Multaq  was  stopped   in er, pt has  Afib   was  hypotensive  in er.  responded   to iv fluids  lopressor dose  decreased  sister wants  neuro care established  here/ to f/p in clinic  pt stable/  valproic  level noted  cleared for d/c by card  plan. dc  home today with f/p  in epilepsy clinic here  per  sister.  pt  needs  ambulette/  pt  is  bed  bound/  np  awrae     < from: Transthoracic Echocardiogram (11.19.18 @ 16:19) >  ONCLUSIONS:  1. Normal mitral valve. Mild mitral regurgitation.  2. Normal left ventricular internal dimensions and wall  thicknesses.  3. Endocardium not well visualized; grossly normal left  ventricular systolic function.  4. Normal right ventricular size and function.  5. Normal tricuspid valve.   Moderate tricuspid  regurgitation.  6.Estimated pulmonary artery systolic pressure equals 47  mm Hg, assuming right atrial pressure equals 10  mm Hg,  consistent with mild pulmonary hypertension.  7. Normal pericardium with small pericardial effusion.  8. Right pleural effusion.    < end of copied text >     < from: CT Head No Cont (03.10.19 @ 17:26) >  IMPRESSION:   Limited study. No CT evidence of acute intracranial hemorrhage, mass   effect, or midline shift.No change 12/28/2018    < end of copied text >                     Plan  [] spoke to ED staff, patient will be admitted to medicine for BP optimization and monitoring  [] would continue current AEDs, it patient has another episode can do rEEG  [] follow up in outpatient epilepsy clinic (need to specify this when making an appointment) [phone]  [] would be cautious with BP meds given episode of hypotension  [] rest of management per primary team

## 2019-05-20 NOTE — DISCHARGE NOTE PROVIDER - CARE PROVIDER_API CALL
Leeroy Cuevas; PhD)  Neurology  [address], [address]  [place] [address]  Phone: [phone]  Fax: [phone]  Follow Up Time: 1 week    [facility]  Follow up in outpatient epilepsy service ([phone], specify epilepsy) or clinic (need to specify this when making an appointment) [phone];  Phone: [phone]  Fax: (   )    -  Follow Up Time: 1 week    Dr. Kaitlynn Springer, PMD  Phone: [phone]  Fax: (   )    -  Follow Up Time: 1 week [facility], Epilepsy Clinic  Follow up in outpatient epilepsy service ([phone], specify epilepsy) or clinic (need to specify this when making an appointment) [phone];  Phone: [phone]  Fax: (   )    -  Follow Up Time: 1 week    Dr. Kaitlynn Springer, PMARCADIO  Phone: [phone]  Fax: (   )    -  Follow Up Time: 1 week [facility], Epilepsy Clinic  Follow up in outpatient epilepsy service ([phone], specify epilepsy) or clinic (need to specify this when making an appointment) [phone];  Phone: [phone]  Fax: (   )    -  Follow Up Time: 1 week    Dr. Araceli Méndez, MELANIE  Phone: [phone]  Fax: (   )    -  Follow Up Time: 1 week [facility], Epilepsy Clinic  Follow up in outpatient epilepsy service ([phone], specify epilepsy) or clinic (need to specify this when making an appointment) [phone];  Phone: [phone]  Fax: (   )    -  Follow Up Time: 1 week    Dr. Araceil Méndez, PMD  Phone: [phone]  Fax: (   )    -  Follow Up Time: 1 week    Dr. Lonnie Samuels, Epilepsy Specialist  [address] [place] [address]  Phone: [phone]  Fax: (   )    -  Follow Up Time:

## 2019-05-20 NOTE — PROVIDER CONTACT NOTE (OTHER) - ACTION/TREATMENT ORDERED:
Continue to monitor
RN will speak with the AM staff before end of shift so they can seek a telemetry box for the patient. RN will continue to follow up and continue to monitor the patient.

## 2019-05-20 NOTE — DISCHARGE NOTE NURSING/CASE MANAGEMENT/SOCIAL WORK - NSDCPEPTSTRK_GEN_ALL_CORE
Need for follow up after discharge/Stroke education booklet/Stroke warning signs and symptoms/Signs and symptoms of stroke/Call 911 for stroke/Risk factors for stroke/Stroke support groups for patients, families, and friends/Prescribed medications

## 2019-05-20 NOTE — DISCHARGE NOTE PROVIDER - PROVIDER TOKENS
PROVIDER:[TOKEN:[[id number]],FOLLOWUP:[1 week]],FREE:[LAST:[[facility]],FIRST:[Epilepsy Clinic],PHONE:[phone]],FAX:[(   )    -],ADDRESS:[Follow up in outpatient epilepsy service ([phone], specify epilepsy) or clinic (need to specify this when making an appointment) [phone];],FOLLOWUP:[1 week]],FREE:[LAST:[Dr. Kaitlynn Springer],FIRST:[PMD],PHONE:[phone]],FAX:[(   )    -],FOLLOWUP:[1 week]] FREE:[LAST:[[facility]],FIRST:[Epilepsy Clinic],PHONE:[phone]],FAX:[(   )    -],ADDRESS:[Follow up in outpatient epilepsy service ([phone], specify epilepsy) or clinic (need to specify this when making an appointment) [phone];],FOLLOWUP:[1 week]],FREE:[LAST:[Dr. Kaitlynn Springer],FIRST:[PMD],PHONE:[phone]],FAX:[(   )    -],FOLLOWUP:[1 week]] FREE:[LAST:[[facility]],FIRST:[Epilepsy Clinic],PHONE:[phone]],FAX:[(   )    -],ADDRESS:[Follow up in outpatient epilepsy service ([phone], specify epilepsy) or clinic (need to specify this when making an appointment) [phone];],FOLLOWUP:[1 week]],FREE:[LAST:[Dr. Araceli Méndez],FIRST:[PMD],PHONE:[phone]],FAX:[(   )    -],FOLLOWUP:[1 week]] FREE:[LAST:[[facility]],FIRST:[Epilepsy Clinic],PHONE:[phone]],FAX:[(   )    -],ADDRESS:[Follow up in outpatient epilepsy service ([phone], specify epilepsy) or clinic (need to specify this when making an appointment) [phone];],FOLLOWUP:[1 week]],FREE:[LAST:[Dr. Araceli Méndez],FIRST:[PMD],PHONE:[phone]],FAX:[(   )    -],FOLLOWUP:[1 week]],FREE:[LAST:[Dr. Lonnie Samuels],FIRST:[Epilepsy Specialist],PHONE:[phone]],FAX:[(   )    -],ADDRESS:[[address] [place], [place] [address]]]

## 2019-05-20 NOTE — PROGRESS NOTE ADULT - SUBJECTIVE AND OBJECTIVE BOX
CARDIOLOGY     PROGRESS  NOTE   ________________________________________________    CHIEF COMPLAINT:Patient is a 34y old  Male who presents with a chief complaint of seizures (19 May 2019 07:26)  doing better.  	  REVIEW OF SYSTEMS:  CONSTITUTIONAL: No fever, weight loss, or fatigue  EYES: No eye pain, visual disturbances, or discharge  ENT:  No difficulty hearing, tinnitus, vertigo; No sinus or throat pain  NECK: No pain or stiffness  RESPIRATORY: No cough, wheezing, chills or hemoptysis; No Shortness of Breath  CARDIOVASCULAR: No chest pain, palpitations, passing out, dizziness, or leg swelling  GASTROINTESTINAL: No abdominal or epigastric pain. No nausea, vomiting, or hematemesis; No diarrhea or constipation. No melena or hematochezia.  GENITOURINARY: No dysuria, frequency, hematuria, or incontinence  NEUROLOGICAL: No headaches, memory loss, loss of strength, numbness, or tremors  SKIN: No itching, burning, rashes, or lesions   LYMPH Nodes: No enlarged glands  ENDOCRINE: No heat or cold intolerance; No hair loss  MUSCULOSKELETAL: No joint pain or swelling; No muscle, back, or extremity pain  PSYCHIATRIC: No depression, anxiety, mood swings, or difficulty sleeping  HEME/LYMPH: No easy bruising, or bleeding gums  ALLERGY AND IMMUNOLOGIC: No hives or eczema	    [ ] All others negative	  [ ] Unable to obtain    PHYSICAL EXAM:  T(C): 36.3 (05-20-19 @ 05:26), Max: 36.9 (05-19-19 @ 20:17)  HR: 64 (05-20-19 @ 05:26) (64 - 111)  BP: 123/74 (05-20-19 @ 05:26) (108/71 - 126/85)  RR: 18 (05-20-19 @ 05:26) (18 - 20)  SpO2: 96% (05-20-19 @ 05:26) (94% - 97%)  Wt(kg): --  I&O's Summary    19 May 2019 07:01  -  20 May 2019 07:00  --------------------------------------------------------  IN: 300 mL / OUT: 0 mL / NET: 300 mL        Appearance: Normal	  HEENT:   Normal oral mucosa, PERRL, EOMI	  Lymphatic: No lymphadenopathy  Cardiovascular: Normal S1 S2, No JVD, + murmurs, No edema  Respiratory: Lungs clear to auscultation	  Psychiatry: A & O x 3, Mood & affect appropriate  Gastrointestinal:  Soft, Non-tender, + BS	  Skin: No rashes, No ecchymoses, No cyanosis	  Neurologic: Non-focal  Extremities: Normal range of motion, No clubbing, cyanosis or edema  Vascular: Peripheral pulses palpable 2+ bilaterally    MEDICATIONS  (STANDING):  clonazePAM  Tablet 2 milliGRAM(s) Oral at bedtime  diVALproex  milliGRAM(s) Oral at bedtime  heparin  Injectable 5000 Unit(s) SubCutaneous every 12 hours  lacosamide 50 milliGRAM(s) Oral two times a day  metoprolol tartrate 25 milliGRAM(s) Oral two times a day  risperiDONE   Tablet 2 milliGRAM(s) Oral two times a day  sodium chloride 0.9%. 1000 milliLiter(s) (50 mL/Hr) IV Continuous <Continuous>      TELEMETRY: 	    ECG:  	  RADIOLOGY:  OTHER: 	  	  LABS:	 	    CARDIAC MARKERS:                                13.1   6.7   )-----------( 197      ( 18 May 2019 10:54 )             41.1     05-18    137  |  97  |  10  ----------------------------<  102<H>  3.7   |  29  |  1.20    Ca    8.9      18 May 2019 10:54  Phos  3.9     05-18  Mg     1.8     05-18    TPro  6.2  /  Alb  3.8  /  TBili  1.2  /  DBili  x   /  AST  13  /  ALT  10  /  AlkPhos  44  05-18    proBNP:   Lipid Profile:   HgA1c:   TSH:   PT/INR - ( 18 May 2019 10:54 )   PT: 13.9 sec;   INR: 1.20 ratio         PTT - ( 18 May 2019 10:54 )  PTT:30.6 sec      Assessment and plan  ---------------------------  doing better  a.fib rate is controlled  continue current meds  no ac  echo

## 2019-05-20 NOTE — DISCHARGE NOTE PROVIDER - HOSPITAL COURSE
patient will be admitted to medicine for BP optimization and monitoring    [] would continue current AEDs, it patient has another episode can do rEEG    [] follow up in outpatient epilepsy clinic (need to specify this when making an appointment) [phone]    [] would be cautious with BP meds given episode of hypotension 34 year old male with PMH of atrial fibrillation, HTN, autism, seizure disorder on vimpat and klonopin . AFIb,  on ASA, with  normal EF. Pt was  admitted with episode of staring spell noticed by the home health aide.       The episode lasted about 1-2 minutes, and then the patient returned back to his baseline (verbal, like to talk about basketball, able to follow some simple commands). Pt was seen by Neuro in ER.    Per Neuro, no further  intervention. Pt's sister wants  Neuro care established  here, Pt will follow-up outpatient epilepsy clinic. Pt stable,  valproic  level noted. Follow up in outpatient epilepsy clinic (need to specify this when making an appointment) [phone]         Pt with h/o PAF. Per cardiology   Multaq  was  stopped . In er, pt has  Afib ,and   hypotensive  in ER , which improved after IV fluids. Lopressor dose  decreased.     Pt is cleared by cardiology for discharge today. 34 year old male with PMH of atrial fibrillation, HTN, autism, seizure disorder on vimpat and klonopin . AFIb,  no anticoagulation,   normal EF. Pt was  admitted with episode of staring spell noticed by the home health aide.       The episode lasted about 1-2 minutes, and then the patient returned back to his baseline (verbal, like to talk about basketball, able to follow some simple commands). Pt was seen by Neuro in ER.    Per Neuro, no further  intervention. Pt's sister wants  Neuro care established  here, Pt will follow-up outpatient epilepsy clinic. Pt stable,  valproic  level noted. Follow up in outpatient epilepsy clinic (need to specify this when making an appointment) [phone]         Pt with h/o PAF. Per cardiology   Multaq  was  stopped . In ER, pt had  Afib ,and   hypotensive  in ER , which improved after IV fluids. Lopressor dose  decreased. Afib rate controlled, no anticoagulation.     Pt is cleared by cardiology for discharge today.

## 2019-05-20 NOTE — CHART NOTE - NSCHARTNOTEFT_GEN_A_CORE
Pt seen for episode of 's x 20 sec . Currently Afib 70's on tele.  Pt denies chest discomfort or SOB. Pt's sister at bedside. Sister reports pt was walking earlier, and pt did not complain of discomfort or showed any distress. vital signs: 125/12-97-75-SpO2 97% on RA-T97.5  D/w with cardiology Dr. Lopez who recommends no changes in regimen, and pt is okay for discharge today.    Kriss Oneill NP [facility] [id number]

## 2019-05-20 NOTE — DISCHARGE NOTE PROVIDER - CARE PROVIDERS DIRECT ADDRESSES
,[email or web address],DirectAddress_Unknown,DirectAddress_Unknown ,DirectAddress_Unknown,DirectAddress_Unknown ,DirectAddress_Unknown,DirectAddress_Unknown,DirectAddress_Unknown

## 2019-05-20 NOTE — DISCHARGE NOTE NURSING/CASE MANAGEMENT/SOCIAL WORK - NSDCFUADDAPPT_GEN_ALL_CORE_FT
Follow up in outpatient epilepsy service ([phone], specify epilepsy) or clinic (need to specify this when making an appointment) [phone];

## 2019-06-05 ENCOUNTER — APPOINTMENT (OUTPATIENT)
Dept: NEUROLOGY | Facility: CLINIC | Age: 34
End: 2019-06-05

## 2019-07-15 NOTE — PROGRESS NOTE ADULT - PROVIDER SPECIALTY LIST ADULT
Cardiology
Surgery
Plan: Will have patient call in with medication he is currently using before prescribing another medication.
Discontinue Regimen: Clotrimazole/Betamethasone Cream
Surgery
Detail Level: Zone
Initiate Treatment: Triamcinolone/nystatin 100,000 unit/g 0.1% Cream apply by topical route to the affected areas on the buttocks once daily prn
Modify Regimen: Hydrocortisone 2.5% cream apply topically to affected areas of eyelids BID PRN only
Modify Regimen: Triamcinolone 0.1% cream BID to arms PRN only

## 2019-09-05 ENCOUNTER — APPOINTMENT (OUTPATIENT)
Dept: NEUROLOGY | Facility: HOSPITAL | Age: 34
End: 2019-09-05

## 2019-11-11 NOTE — PATIENT PROFILE ADULT - NSPROEDALEARNER_GEN_A_NUR
PEDIATRIC ILLNESS VISIT   11/11/2019        Roomed by: Micheline De La Cruz MD 4:52 PM      SUBJECTIVE   accompanied by mother  Maura is a 12 year old female who is complaining of rebellious behavior, seems to be contradicting her mother on all topics , .  Present for months and is worsening.  Present treatments include - mom is attempting to take Maura to psychology but she is refusing to go.   Previous medical contacts for the problem - none  Symptoms:  Fever: no fever  Review of Systems   Constitutional: Negative for activity change, appetite change, fatigue, fever, irritability and unexpected weight change.   HENT: Negative for congestion, dental problem, drooling, ear pain, hearing loss and rhinorrhea.    Eyes: Negative for discharge and visual disturbance.   Respiratory: Negative.    Cardiovascular: Negative for chest pain.   Gastrointestinal: Negative for abdominal distention, abdominal pain, blood in stool, constipation, diarrhea and vomiting.   Endocrine: Negative for cold intolerance and heat intolerance.   Genitourinary: Negative for difficulty urinating, dysuria, frequency and hematuria.   Musculoskeletal: Negative for gait problem, joint swelling and neck pain.   Skin: Negative.    Allergic/Immunologic: Negative.    Neurological: Negative for dizziness, speech difficulty and headaches.   Hematological: Negative for adenopathy.   Psychiatric/Behavioral: Positive for agitation and behavioral problems. Negative for decreased concentration, dysphoric mood, self-injury, sleep disturbance and suicidal ideas. The patient is nervous/anxious. The patient is not hyperactive.      Allergies:  ALLERGIES:  No Known Allergies    Current Outpatient Medications   Medication Sig Dispense Refill   • fluticasone (FLONASE) 50 MCG/ACT nasal spray Spray 1 spray in each nostril 2 times daily. 16 g 2   • hydroCORTisone (CORTIZONE) 2.5 % cream Apply topically 3 times daily. 15 g 2     No current facility-administered medications  for this visit.        No family history on file.  No other family members have acute illness     Social history:   Attends school    OBJECTIVE:   Visit Vitals  Pulse 80   Temp 97.1 °F (36.2 °C) (Temporal)   Resp 20   Wt 37.9 kg (83 lb 9.6 oz)     Physical Exam  Vitals signs reviewed.   Constitutional:       Appearance: She is well-developed.   HENT:      Head: Atraumatic.      Right Ear: Tympanic membrane normal.      Left Ear: Tympanic membrane normal.      Nose: Nose normal.      Mouth/Throat:      Mouth: Mucous membranes are moist.      Pharynx: Oropharynx is clear.   Eyes:      Conjunctiva/sclera: Conjunctivae normal.      Pupils: Pupils are equal, round, and reactive to light.   Neck:      Musculoskeletal: Normal range of motion.   Cardiovascular:      Rate and Rhythm: Normal rate and regular rhythm.      Pulses: Pulses are strong.      Heart sounds: S1 normal and S2 normal.   Pulmonary:      Effort: Pulmonary effort is normal. No respiratory distress.      Breath sounds: Normal breath sounds and air entry.   Abdominal:      General: Abdomen is flat. Bowel sounds are normal. There is no distension.      Palpations: Abdomen is soft. There is no mass.      Tenderness: There is no tenderness. There is no guarding.   Musculoskeletal: Normal range of motion.         General: No tenderness, deformity or signs of injury.   Lymphadenopathy:      Cervical: No cervical adenopathy.   Skin:     General: Skin is warm.      Capillary Refill: Capillary refill takes less than 2 seconds.   Neurological:      Mental Status: She is alert.      Cranial Nerves: No cranial nerve deficit.      Sensory: No sensory deficit.      Motor: No abnormal muscle tone.      Coordination: Coordination normal.      Deep Tendon Reflexes: Reflexes normal.   Psychiatric:      Comments: Seems to contradict and interrupt mom throughtout the entire interview.  Seems very angry at her mother.          ASSESSMENT/PLAN  Behavior Concerns/oppositional....  refer to psych for evaluation of personality DO   Anxiety -  counseling, relaxation therapy, referral to psych  No orders of the defined types were placed in this encounter.    Greater than 50% of the visit was spent counseling regarding above issues; total time spent 30 minutes.      Medication changes: No    Immunizations given today? No  See Orders:  Instructed to call if the problem worsens or does not improve within the next 24 to 48 hours.    Schedule follow-up: hay De La Cruz MD         legal guardian

## 2020-01-01 NOTE — ED ADULT NURSE NOTE - NS ED NURSE DISCH DISPOSITION
Newly diagnosed hypertension  Must exclude secondary causes RVH; OSAS; Endocrine hypertension;   Will check pituitary axis (FSH, LH, ACTH, TSH; GH, Prolactin)  Aldosterone/renin ratio  Plasma free metanephrines  Admitted to ICU; Continue cardene with institution of an oral regimen pending course.    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 Soriano of cardiology aware; continue present regimen;  Holding on ACEI at present;    Discharged

## 2020-10-25 NOTE — ED PROVIDER NOTE - NEUROLOGICAL, MLM
English Alert and oriented, no focal deficits, no motor or sensory deficits. Closure 2 Information: This tab is for additional flaps and grafts, including complex repair and grafts and complex repair and flaps. You can also specify a different location for the additional defect, if the location is the same you do not need to select a new one. We will insert the automated text for the repair you select below just as we do for solitary flaps and grafts. Please note that at this time if you select a location with a different insurance zone you will need to override the ICD10 and CPT if appropriate.

## 2020-12-10 NOTE — H&P PST ADULT - PROBLEM SELECTOR PROBLEM 4
no rashes, no suspicious lesions, no areas of discoloration, no jaundice present, good turgor, no masses, no tenderness on palpation Autism spectrum disorder Seizures

## 2021-06-01 NOTE — ED ADULT NURSE REASSESSMENT NOTE - NS ED NURSE REASSESS COMMENT FT1
Pt awaiting CT head at this time. Pt more alert and awake. As per family patient is at his baseline status at this time. Will continue to monitor. Hydroxyzine Pregnancy And Lactation Text: This medication is not safe during pregnancy and should not be taken. It is also excreted in breast milk and breast feeding isn't recommended.

## 2021-09-14 NOTE — BRIEF OPERATIVE NOTE - DISPOSITION
Chart review, examination, documentation, care coordination and counseling. Chart review, examination, documentation, care coordination and counseling. Chart review, examination, documentation, care coordination and counseling. Chart review, examination, documentation, care coordination and counseling. Chart review, examination, documentation, care coordination and counseling. PACU floor

## 2022-01-31 NOTE — ED ADULT NURSE NOTE - NURSING NEURO LEVEL OF CONSCIOUSNESS
Detail Level: Detailed Detail Level: Zone Otc Regimen: Viviscal supplements Plan: JJHONNYC recommended topical 5% minoxidil. Continue Regimen: Cordran tape at night to affected areas of nose ( patient will continue the Cordran tape every night for one month and then eventually taper off the Cordran tape) Plan: DELFINO recommended patient apply sunscreen daily. She also discussed friendly makeup products for sensitive skin. ( Almay, [employer], Elta MD) lethargic

## 2022-03-17 NOTE — ED ADULT NURSE NOTE - CAS EDP DISCH TYPE
TRANSFER - IN REPORT:    Verbal report received from trina Guzmán  being received from ED (unit) for routine progression of care      Report consisted of patients Situation, Background, Assessment and   Recommendations(SBAR). Information from the following report(s) Kardex was reviewed with the receiving nurse. Opportunity for questions and clarification was provided. Assessment completed upon patients arrival to unit and care assumed. Home

## 2022-05-04 NOTE — ED ADULT NURSE NOTE - NS ED NURSE RECORD ANOTHER HT AND WT
Wound Care Instructions right lower extremity (front):  1) Wash your hands and work space  2) Gather all your supplies: clean wash cloths, mild soap (baby soap), betadine swabs  3) Cleanse around wound with mild soap, rinse, pat dry  4) Paint wound with betadine swabs   5) Apply the betadine daily  6) Dispose of all dirty supplies  7) Wash hands and equipment    Wound Care Instructions right lower extremity (back):  1) Wash your hands and work space  2) Gather all your supplies: clean wash cloths, mild soap (baby soap), Mepilex Ag bordered foam  3) Cleanse around wound with mild soap, rinse, pat dry  4) Apply Mepilex Ag bordered foam over the wound.    5) Change every three days  6) Dispose of all dirty supplies  7) Wash hands and equipment    Please report any increase in pain, fevers, chills, changes in the drainage odor.   Please call [phone] if you have any questions or concerns or if any problems develop.     Follow up May 19, 2022 in surgery clinic.    No

## 2022-05-11 NOTE — ED ADULT NURSE NOTE - CHPI ED NUR SEIZURE POST SYMPTOMS
Red Epstein D.O., PGY3 (Resident) Dr. Burnett Red Epstein D.O., PGY3 (Resident) decreased response/lethargy

## 2022-10-26 NOTE — DISCHARGE NOTE NURSING/CASE MANAGEMENT/SOCIAL WORK - NSDCPEFALRISK_GEN_ALL_CORE
[facility]   Physician Orders and Discharge Instructions  [facility]  [address] [facility]  [facility]  Telephone: [phone]        NAME:  Lilian Hsieh OF BIRTH:  1932  MEDICAL RECORD NUMBER:  [id number]     Your  is:  [address] [place] [address] Care/Facility: [facility] - please obtain supplies     Wound Location:  Right Posterior Ankle and Right great toe     Dressing orders: . 1. Cleanse wound(s) with normal saline. Apply Mupirocin ointment to wound bed then cover with   2. Apply dry HYDROFERA BLUE READY FOAM or equivalent to wound bed. NOTE: If your Lavella Kos is not soft and pliable, moisten with saline until it is sponge like and then ring out excess saline and apply to wound bed. 3. Cover with 4x4's and wrap with gauze (ifeanyi or kerlix)  4. Change  Every other day or Monday, Wednesday, and Friday         Wound Location: Right Buttock  Dressing orders: Apply Triad ointment daily               Compression: None     Offloading Device:     Other Instructions:  Change positions at least every 2 hours. Try to lay down during the daytime hours. Call your PCP or a orthopedic specialist to evaluate your Left Knee.  the Mupirocin ointment at your Pharmacy. Keep all dressings clean, dry and intact. Keep pressure off the wound(s) at all times. Follow up visit   2 Weeks November 9, 2022 @  3:00 pm      Please give 24 hour notice if unable to keep appointment. [phone]     If you experience any of the following, please call the Wound Care Service at  [phone] or go to the nearest emergency room.         *Increase in pain         *Temperature over 101           *Increase in drainage from your wound or a foul odor  *Uncontrolled swelling            *Need for compression bandage Patient information on fall and injury prevention changes due to slippage, breakthrough drainage       PLEASE NOTE: IF YOU ARE UNABLE TO OBTAIN WOUND SUPPLIES, CONTINUE TO USE THE SUPPLIES YOU HAVE AVAILABLE UNTIL YOU ARE ABLE TO REACH US.  IT IS MOST IMPORTANT TO KEEP THE WOUND COVERED AT ALL TIMES
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
## 2023-05-23 NOTE — PROGRESS NOTE ADULT - SUBJECTIVE AND OBJECTIVE BOX
SICU AM PROGRESS NOTE   =====================================================    Overnight / Interval Events :     In the AM Lopressor dose was increased to 5 lopressor Q6h , pt continued to be on 0.5 of amio gtt for Afib . Since there is NGT  output – pt continues to be on IVF . Myrick was discontinued – passed the trial of void       Surgery Information:  Laparotomy ileocecectomy with pus    EBL: 50cc	    HPI:  33M PMHx autism (non-verbal at baseline), HTN, chronic umbilical hernia presented to [facility] ED the afternoon of 2/3/18 for perceived abd pain. Unknown how long hernia present, or whether congenital; never able to reduce. Recently presented to OSH 3 days prior to presentation ([facility] ED 1/31) complaining of 1 episode of vomiting, and sent home w/ dx of viral gastroenteritis. Since that episode patient has not vomited, but family have perceived that pt appears more uncomfortable in recent few days and has been hiccuping. His umbilical area also appeared to grow in size & the overlying skin became red. Recent URI sx including cough that have largely resolved. Denies fever. last BM the morning of presentation and in ED. Patient hospitalized ~2 years ago at [facility] (after eating a banana whole, including peel) for esophageal tear (no operative intervention) and was discharged on liquid then puree/mush diet for ~6months. Patient reportedly will eat foreign objects within reach.     In ED VS: T37.1, P107, /99, RR18, SpO2 95% on RA. Labs notable for 83% neutrophils, Na 131, Cl 90, BUN 24, Cr 1.4, venous lactate 1.6. CT showing incarcerated umbilical hernia containing bowel, possibly ischemic changes and foreign objects in hernia & R colon. Received 2L NS, vanc/zosyn. General Surgery Consulted. (03 Feb 2018 18:23)    Allergies: NKDA    PAST MEDICAL & SURGICAL HISTORY:  Hypertension  Autism spectrum disorder  Hx seizure (remote; per sister)  chronic umbilical hernia  paroxysmal AFib (pt had been prescribed eloquis in 2017 per PMD)  Gastritis  Pica w/ hx of esophageal rupture  No significant past surgical history    FAMILY HISTORY: No pertinent family history in first degree relatives    SOCIAL HISTORY:  N/A    ADVANCE DIRECTIVES: Presumed Full Code      REVIEW OF SYSTEMS:  See HPI. Otherwise, 10 point ROS done and otherwise negative    CURRENT MEDICATIONS:   --------------------------------------------------------------------------------------  Neurologic Medications:  acetaminophen  IVPB. 1000 milliGRAM(s) IV Intermittent once  valproate sodium IVPB 250 milliGRAM(s) IV Intermittent every 12 hours    Respiratory Medications:    Cardiovascular Medications:  amiodarone Infusion 0.5 mG/Min IV Continuous <Continuous>  metoprolol    tartrate Injectable 2.5 milliGRAM(s) IV Push every 6 hours  phenylephrine    Infusion 0.5 MICROgram(s)/kG/Min IV Continuous <Continuous>    Gastrointestinal Medications:  sodium chloride 0.9%. 1000 milliLiter(s) IV Continuous <Continuous>    Genitourinary Medications:    Hematologic/Oncologic Medications:  enoxaparin Injectable 40 milliGRAM(s) SubCutaneous every 24 hours    Antimicrobials/Immunologic Medications:  piperacillin/tazobactam IVPB. 3.375 Gram(s) IV Intermittent every 8 hours    Endocrine/Metabolic Medications:    Topical/Other Medications:            VITAL SIGNS, INS/OUTS (last 24 hours):  --------------------------------------------------------------------------------------    T(C): 36.7 (02-06-18 @ 00:00), Max: 38.4 (02-05-18 @ 15:00)  HR: 140 (02-06-18 @ 00:00) (75 - 149)  BP: 103/57 (02-06-18 @ 00:00) (84/59 - 121/58)  BP(mean): 68 (02-06-18 @ 00:00) (61 - 80)  ABP: 73/61 (02-05-18 @ 14:00) (68/61 - 122/107)  ABP(mean): 65 (02-05-18 @ 14:00) (63 - 114)  RR: 19 (02-06-18 @ 00:00) (14 - 24)  SpO2: 96% (02-06-18 @ 00:00) (91% - 100%)  Wt(kg): --  CVP(mm Hg): --  CI: --  CAPILLARY BLOOD GLUCOSE       N/A      02-04 @ 07:01  -  02-05 @ 07:00  --------------------------------------------------------  IN:    amiodarone Infusion: 199.8 mL    amiodarone Infusion: 250.5 mL    IV PiggyBack: 350 mL    phenylephrine   Infusion: 73.8 mL    phenylephrine   Infusion: 320.4 mL    sodium chloride 0.9%.: 2350 mL  Total IN: 3544.5 mL    OUT:    Indwelling Catheter - Urethral: 1095 mL    Nasoenteral Tube: 160 mL  Total OUT: 1255 mL    Total NET: 2289.5 mL      02-05 @ 07:01  -  02-06 @ 01:49  --------------------------------------------------------  IN:    amiodarone Infusion: 83.5 mL    amiodarone Infusion: 200.4 mL    IV PiggyBack: 750 mL    phenylephrine   Infusion: 41.2 mL    sodium chloride 0.9%.: 1650 mL  Total IN: 2725.1 mL    OUT:    Indwelling Catheter - Urethral: 565 mL    Nasoenteral Tube: 600 mL  Total OUT: 1165 mL    Total NET: 1560.1 mL                EXAM  --------------------------------------------------------------------------------------  NEUROLOGY: Minimal verbal ability, CN/motor/sensory grossly intact  HEENT: Normocephalic, atraumatic.  EOMI.   RESPIRATORY: Lungs clear to auscultation, Normal expansion/effort.    CARDIOVASCULAR: S1, S2.  Irregular heart rate. Tachycardia.   GI/NUTRITION: Abdomen soft, Non-tender, Non-distended. NGT on low suction; NPO; incision w/ surgical dressing over the abdomen, no sx of infection.   VASCULAR: Extremities warm, pink, well-perfused.  SCDs in place.  MUSCULOSKELETAL: All extremities moving spontaneously without limitations.   SKIN: Good skin turgor, no skin breakdown.               ============================================================    Tubes/Lines/Drains   [x] Peripheral IV x2 on Left  [x] Central Venous Line    [] R	[] L	[x] IJ	[] Fem	[] SC	Date Placed: 2/4/18  [] Arterial Line		[] R	[] L	[] Fem	[x] Rad	[] Ax	Date Placed:   [] PICC:         		[] Midline	[] Mediport  [x] Urinary Catheter	Date Placed: 2/3/18  [x]NGT              	Date Placed: 2/4/18    LABS  --------------------------------------------------------------------------------------          -------------------------------------------------------------------------------------  IMAGING RESULTS    GIOVANNI: February 4, 2018 at 09:54 AM  Conclusions:  Technically difficult study.  1. Normal mitral valve. Mild mitral regurgitation.  2. Normal trileaflet aortic valve. No aortic valve regurgitation seen.  3. Endocardium not well visualized; grossly low normal left  ventricular systolic function. Patient in atrial fibrillation with rapid ventricular rate; ejection fraction varies with R-R interval.  4. Right ventricle assessment limited by atrial fibrillation with rapid ventricular response and poor visualization of the RV; grossly the right ventircle appears normal size with midlly decreased systolic function.  5. Normal tricuspid valve.   Moderate tricuspid regurgitation.  6. Small pericardial effusion.     Portable CXR: February 5, 2018 at 12:11 AM  INTERPRETATION: Enteric tube and right IJ line in place. Heart is enlarged but stable. No focal consolidations. No pneumothorax or obvious effusions.  Right upper lobe atelectasis is totally resolved.  COMPARISON:  February 4    IMPRESSION:  Clear lungs post abdominal surgery with resolution of right upper lobe atelectasis. SICU AM PROGRESS NOTE   =====================================================    Overnight / Interval Events :     In the AM Lopressor dose was increased to 5 lopressor Q6h , pt continued to be on 0.5 of amio gtt for Afib . Since there is NGT  output – pt continues to be on IVF . Myrick was discontinued – passed the trial of void. Pt had bowel movement .  ml output .        Surgery Information:  Laparotomy ileocecectomy with pus    EBL: 50cc	    HPI:  33M PMHx autism (non-verbal at baseline), HTN, chronic umbilical hernia presented to [facility] ED the afternoon of 2/3/18 for perceived abd pain. Unknown how long hernia present, or whether congenital; never able to reduce. Recently presented to OSH 3 days prior to presentation ([facility] ED ) complaining of 1 episode of vomiting, and sent home w/ dx of viral gastroenteritis. Since that episode patient has not vomited, but family have perceived that pt appears more uncomfortable in recent few days and has been hiccuping. His umbilical area also appeared to grow in size & the overlying skin became red. Recent URI sx including cough that have largely resolved. Denies fever. last BM the morning of presentation and in ED. Patient hospitalized ~2 years ago at [facility] (after eating a banana whole, including peel) for esophageal tear (no operative intervention) and was discharged on liquid then puree/mush diet for ~6months. Patient reportedly will eat foreign objects within reach.     In ED VS: T37.1, P107, /99, RR18, SpO2 95% on RA. Labs notable for 83% neutrophils, Na 131, Cl 90, BUN 24, Cr 1.4, venous lactate 1.6. CT showing incarcerated umbilical hernia containing bowel, possibly ischemic changes and foreign objects in hernia & R colon. Received 2L NS, vanc/zosyn. General Surgery Consulted. (2018 18:23)    Allergies: NKDA    PAST MEDICAL & SURGICAL HISTORY:  Hypertension  Autism spectrum disorder  Hx seizure (remote; per sister)  chronic umbilical hernia  paroxysmal AFib (pt had been prescribed eloquis in 2017 per PMD)  Gastritis  Pica w/ hx of esophageal rupture  No significant past surgical history    FAMILY HISTORY: No pertinent family history in first degree relatives    SOCIAL HISTORY:  N/A    ADVANCE DIRECTIVES: Presumed Full Code      REVIEW OF SYSTEMS:  See HPI. Otherwise, 10 point ROS done and otherwise negative    CURRENT MEDICATIONS:   --------------------------------------------------------------------------------------  Neurologic Medications:  acetaminophen  IVPB. 1000 milliGRAM(s) IV Intermittent once  valproate sodium IVPB 250 milliGRAM(s) IV Intermittent every 12 hours    Respiratory Medications:    Cardiovascular Medications:  amiodarone Infusion 0.5 mG/Min IV Continuous <Continuous>  metoprolol    tartrate Injectable 2.5 milliGRAM(s) IV Push every 6 hours  phenylephrine    Infusion 0.5 MICROgram(s)/kG/Min IV Continuous <Continuous>    Gastrointestinal Medications:  sodium chloride 0.9%. 1000 milliLiter(s) IV Continuous <Continuous>    Genitourinary Medications:    Hematologic/Oncologic Medications:  enoxaparin Injectable 40 milliGRAM(s) SubCutaneous every 24 hours    Antimicrobials/Immunologic Medications:  piperacillin/tazobactam IVPB. 3.375 Gram(s) IV Intermittent every 8 hours    Endocrine/Metabolic Medications:    Topical/Other Medications:            VITAL SIGNS, INS/OUTS (last 24 hours):  --------------------------------------------------------------------------------------    T(C): 36.7 (18 @ 00:00), Max: 38.4 (18 @ 15:00)  HR: 140 (18 @ 00:00) (75 - 149)  BP: 103/57 (18 @ 00:00) (84/59 - 121/58)  BP(mean): 68 (18 @ 00:00) (61 - 80)  ABP: 73/61 (18 @ 14:00) (68/61 - 122/107)  ABP(mean): 65 (18 @ 14:00) (63 - 114)  RR: 19 (18 @ 00:00) (14 - 24)  SpO2: 96% (18 @ 00:00) (91% - 100%)  Wt(kg): --  CVP(mm Hg): --  CI: --  CAPILLARY BLOOD GLUCOSE       N/A       @ 07:01  -   @ 07:00  --------------------------------------------------------  IN:    amiodarone Infusion: 199.8 mL    amiodarone Infusion: 250.5 mL    IV PiggyBack: 350 mL    phenylephrine   Infusion: 73.8 mL    phenylephrine   Infusion: 320.4 mL    sodium chloride 0.9%.: 2350 mL  Total IN: 3544.5 mL    OUT:    Indwelling Catheter - Urethral: 1095 mL    Nasoenteral Tube: 160 mL  Total OUT: 1255 mL    Total NET: 2289.5 mL       @ 07:01  -   @ 01:49  --------------------------------------------------------  IN:    amiodarone Infusion: 83.5 mL    amiodarone Infusion: 200.4 mL    IV PiggyBack: 750 mL    phenylephrine   Infusion: 41.2 mL    sodium chloride 0.9%.: 1650 mL  Total IN: 2725.1 mL    OUT:    Indwelling Catheter - Urethral: 565 mL    Nasoenteral Tube: 600 mL  Total OUT: 1165 mL    Total NET: 1560.1 mL                EXAM  --------------------------------------------------------------------------------------  NEUROLOGY: Minimal verbal ability, CN/motor/sensory grossly intact  HEENT: Normocephalic, atraumatic.  EOMI.   RESPIRATORY: Lungs clear to auscultation, Normal expansion/effort.    CARDIOVASCULAR: S1, S2.  Irregular heart rate. Tachycardia.   GI/NUTRITION: Abdomen soft, Non-tender, Non-distended. NGT on low suction; NPO; incision w/ surgical dressing over the abdomen, no sx of infection.   VASCULAR: Extremities warm, pink, well-perfused.  SCDs in place.  MUSCULOSKELETAL: All extremities moving spontaneously without limitations.   SKIN: Good skin turgor, no skin breakdown.               ============================================================    Tubes/Lines/Drains   [x] Peripheral IV x2 on Left  [x] Central Venous Line    [] R	[] L	[x] IJ	[] Fem	[] SC	Date Placed: 18  [] Arterial Line		[] R	[] L	[] Fem	[x] Rad	[] Ax	Date Placed:   [] PICC:         		[] Midline	[] Mediport  [x] Urinary Catheter	Date Placed: 2/3/18  [x]NGT              	Date Placed: 18    LABS  --------------------------------------------------------------------------------------    CBC ( @ 02:55)                              9.9<L>                       6.35    )--------------(  339        --    % Neuts, --    % Lymphs, ANC: --                                  31.2<L>  CBC ( @ 03:20)                              9.9<L>                       6.46    )--------------(  284        --    % Neuts, --    % Lymphs, ANC: --                                  30.3<L>    BMP ( @ 02:55)             140     |  102     |  15    		Ca++ --      Ca 7.5<L>             ---------------------------------( 90    		Mg 1.8                3.5     |  25      |  0.76  			Ph 2.6     BMP ( @ 03:20)             139     |  102     |  12    		Ca++ --      Ca 7.1<L>             ---------------------------------( 80    		Mg 2.0                4.1     |  27      |  0.78  			Ph 2.7                 Urinalysis ( @ 16:00):     Color: YELLOW / Appearance: CLEAR / S.034 / pH: 6.0 / Gluc: NEGATIVE / Ketones: SMALL<H> / Bili: NEGATIVE / Urobili: NORMAL / Protein :20 / Nitrites: NEGATIVE / Leuk.Est: NEGATIVE / RBC: 2-5 / WBC: 2-5 / Sq Epi: OCC / Non Sq Epi:  / Bacteria FEW       -> BLOOD VENOUS Culture ( @ 16:57)     NG    NG  NG          -------------------------------------------------------------------------------------  IMAGING RESULTS    GIOVANNI: 2018 at 09:54 AM  Conclusions:  Technically difficult study.  1. Normal mitral valve. Mild mitral regurgitation.  2. Normal trileaflet aortic valve. No aortic valve regurgitation seen.  3. Endocardium not well visualized; grossly low normal left  ventricular systolic function. Patient in atrial fibrillation with rapid ventricular rate; ejection fraction varies with R-R interval.  4. Right ventricle assessment limited by atrial fibrillation with rapid ventricular response and poor visualization of the RV; grossly the right ventircle appears normal size with midlly decreased systolic function.  5. Normal tricuspid valve.   Moderate tricuspid regurgitation.  6. Small pericardial effusion.     Portable CXR: 2018 at 12:11 AM  INTERPRETATION: Enteric tube and right IJ line in place. Heart is enlarged but stable. No focal consolidations. No pneumothorax or obvious effusions.  Right upper lobe atelectasis is totally resolved.  COMPARISON:      IMPRESSION:  Clear lungs post abdominal surgery with resolution of right upper lobe atelectasis. SICU AM PROGRESS NOTE   =====================================================    Overnight / Interval Events :     In the AM Lopressor dose was increased to 5 lopressor Q6h , pt continued to be on 0.5 of amio gtt for Afib . Since there is NGT  output – pt continues to be on IVF . Myrick was discontinued – passed the trial of void. Pt had two  bowel movements overnight  .  ml output .       Surgery Information:  Laparotomy ileocecectomy with pus    EBL: 50cc	    HPI:  33M PMHx autism (non-verbal at baseline), HTN, chronic umbilical hernia presented to [facility] ED the afternoon of 2/3/18 for perceived abd pain. Unknown how long hernia present, or whether congenital; never able to reduce. Recently presented to OSH 3 days prior to presentation ([facility] ED ) complaining of 1 episode of vomiting, and sent home w/ dx of viral gastroenteritis. Since that episode patient has not vomited, but family have perceived that pt appears more uncomfortable in recent few days and has been hiccuping. His umbilical area also appeared to grow in size & the overlying skin became red. Recent URI sx including cough that have largely resolved. Denies fever. last BM the morning of presentation and in ED. Patient hospitalized ~2 years ago at [facility] (after eating a banana whole, including peel) for esophageal tear (no operative intervention) and was discharged on liquid then puree/mush diet for ~6months. Patient reportedly will eat foreign objects within reach.     In ED VS: T37.1, P107, /99, RR18, SpO2 95% on RA. Labs notable for 83% neutrophils, Na 131, Cl 90, BUN 24, Cr 1.4, venous lactate 1.6. CT showing incarcerated umbilical hernia containing bowel, possibly ischemic changes and foreign objects in hernia & R colon. Received 2L NS, vanc/zosyn. General Surgery Consulted. (2018 18:23)    Allergies: NKDA    PAST MEDICAL & SURGICAL HISTORY:  Hypertension  Autism spectrum disorder  Hx seizure (remote; per sister)  chronic umbilical hernia  paroxysmal AFib (pt had been prescribed eloquis in 2017 per PMD)  Gastritis  Pica w/ hx of esophageal rupture  No significant past surgical history    FAMILY HISTORY: No pertinent family history in first degree relatives    SOCIAL HISTORY:  N/A    ADVANCE DIRECTIVES: Presumed Full Code      REVIEW OF SYSTEMS:  See HPI. Otherwise, 10 point ROS done and otherwise negative    CURRENT MEDICATIONS:   --------------------------------------------------------------------------------------  Neurologic Medications:  acetaminophen  IVPB. 1000 milliGRAM(s) IV Intermittent once  valproate sodium IVPB 250 milliGRAM(s) IV Intermittent every 12 hours    Respiratory Medications:    Cardiovascular Medications:  amiodarone Infusion 0.5 mG/Min IV Continuous <Continuous>  metoprolol    tartrate Injectable 2.5 milliGRAM(s) IV Push every 6 hours  phenylephrine    Infusion 0.5 MICROgram(s)/kG/Min IV Continuous <Continuous>    Gastrointestinal Medications:  sodium chloride 0.9%. 1000 milliLiter(s) IV Continuous <Continuous>    Genitourinary Medications:    Hematologic/Oncologic Medications:  enoxaparin Injectable 40 milliGRAM(s) SubCutaneous every 24 hours    Antimicrobials/Immunologic Medications:  piperacillin/tazobactam IVPB. 3.375 Gram(s) IV Intermittent every 8 hours    Endocrine/Metabolic Medications:    Topical/Other Medications:            VITAL SIGNS, INS/OUTS (last 24 hours):  --------------------------------------------------------------------------------------    T(C): 36.7 (18 @ 00:00), Max: 38.4 (18 @ 15:00)  HR: 140 (18 @ 00:00) (75 - 149)  BP: 103/57 (18 @ 00:00) (84/59 - 121/58)  BP(mean): 68 (18 @ 00:00) (61 - 80)  ABP: 73/61 (18 @ 14:00) (68/61 - 122/107)  ABP(mean): 65 (18 @ 14:00) (63 - 114)  RR: 19 (18 @ 00:00) (14 - 24)  SpO2: 96% (18 @ 00:00) (91% - 100%)  Wt(kg): --  CVP(mm Hg): --  CI: --  CAPILLARY BLOOD GLUCOSE       N/A       @ 07:01  -   @ 07:00  --------------------------------------------------------  IN:    amiodarone Infusion: 199.8 mL    amiodarone Infusion: 250.5 mL    IV PiggyBack: 350 mL    phenylephrine   Infusion: 73.8 mL    phenylephrine   Infusion: 320.4 mL    sodium chloride 0.9%.: 2350 mL  Total IN: 3544.5 mL    OUT:    Indwelling Catheter - Urethral: 1095 mL    Nasoenteral Tube: 160 mL  Total OUT: 1255 mL    Total NET: 2289.5 mL       @ 07:01  -   @ 01:49  --------------------------------------------------------  IN:    amiodarone Infusion: 83.5 mL    amiodarone Infusion: 200.4 mL    IV PiggyBack: 750 mL    phenylephrine   Infusion: 41.2 mL    sodium chloride 0.9%.: 1650 mL  Total IN: 2725.1 mL    OUT:    Indwelling Catheter - Urethral: 565 mL    Nasoenteral Tube: 600 mL  Total OUT: 1165 mL    Total NET: 1560.1 mL                EXAM  --------------------------------------------------------------------------------------  NEUROLOGY: Minimal verbal ability, CN/motor/sensory grossly intact  HEENT: Normocephalic, atraumatic.  EOMI.   RESPIRATORY: Lungs clear to auscultation, Normal expansion/effort.    CARDIOVASCULAR: S1, S2.  Irregular heart rate. Tachycardia.   GI/NUTRITION: Abdomen soft, Non-tender, Non-distended. NGT on low suction; NPO; incision w/ surgical dressing over the abdomen, no sx of infection.   VASCULAR: Extremities warm, pink, well-perfused.  SCDs in place.  MUSCULOSKELETAL: All extremities moving spontaneously without limitations.   SKIN: Good skin turgor, no skin breakdown.               ============================================================    Tubes/Lines/Drains   [x] Peripheral IV x2 on Left  [x] Central Venous Line    [] R	[] L	[x] IJ	[] Fem	[] SC	Date Placed: 18  [] Arterial Line		[] R	[] L	[] Fem	[x] Rad	[] Ax	Date Placed:   [] PICC:         		[] Midline	[] Mediport  [x] Urinary Catheter	Date Placed: 2/3/18  [x]NGT              	Date Placed: 18    LABS  --------------------------------------------------------------------------------------    CBC ( @ 02:55)                              9.9<L>                       6.35    )--------------(  339        --    % Neuts, --    % Lymphs, ANC: --                                  31.2<L>  CBC ( @ 03:20)                              9.9<L>                       6.46    )--------------(  284        --    % Neuts, --    % Lymphs, ANC: --                                  30.3<L>    BMP ( @ 02:55)             140     |  102     |  15    		Ca++ --      Ca 7.5<L>             ---------------------------------( 90    		Mg 1.8                3.5     |  25      |  0.76  			Ph 2.6     BMP ( @ 03:20)             139     |  102     |  12    		Ca++ --      Ca 7.1<L>             ---------------------------------( 80    		Mg 2.0                4.1     |  27      |  0.78  			Ph 2.7                 Urinalysis ( @ 16:00):     Color: YELLOW / Appearance: CLEAR / S.034 / pH: 6.0 / Gluc: NEGATIVE / Ketones: SMALL<H> / Bili: NEGATIVE / Urobili: NORMAL / Protein :20 / Nitrites: NEGATIVE / Leuk.Est: NEGATIVE / RBC: 2-5 / WBC: 2-5 / Sq Epi: OCC / Non Sq Epi:  / Bacteria FEW       -> BLOOD VENOUS Culture ( @ 16:57)     NG    NG  NG          -------------------------------------------------------------------------------------  IMAGING RESULTS    GIOVANNI: 2018 at 09:54 AM  Conclusions:  Technically difficult study.  1. Normal mitral valve. Mild mitral regurgitation.  2. Normal trileaflet aortic valve. No aortic valve regurgitation seen.  3. Endocardium not well visualized; grossly low normal left  ventricular systolic function. Patient in atrial fibrillation with rapid ventricular rate; ejection fraction varies with R-R interval.  4. Right ventricle assessment limited by atrial fibrillation with rapid ventricular response and poor visualization of the RV; grossly the right ventircle appears normal size with midlly decreased systolic function.  5. Normal tricuspid valve.   Moderate tricuspid regurgitation.  6. Small pericardial effusion.     Portable CXR: 2018 at 12:11 AM  INTERPRETATION: Enteric tube and right IJ line in place. Heart is enlarged but stable. No focal consolidations. No pneumothorax or obvious effusions.  Right upper lobe atelectasis is totally resolved.  COMPARISON:      IMPRESSION:  Clear lungs post abdominal surgery with resolution of right upper lobe atelectasis. Suicidality/Homicidality

## 2023-09-06 NOTE — PATIENT PROFILE ADULT - FALL HARM RISK CONCLUSION
Nsaids Counseling: NSAID Counseling: I discussed with the patient that NSAIDs should be taken with food. Prolonged use of NSAIDs can result in the development of stomach ulcers.  Patient advised to stop taking NSAIDs if abdominal pain occurs.  The patient verbalized understanding of the proper use and possible adverse effects of NSAIDs.  All of the patient's questions and concerns were addressed. Fall with Harm Risk

## 2024-11-24 NOTE — ED ADULT NURSE NOTE - DOES PATIENT HAVE ADVANCE DIRECTIVE
ATTN: Case Management  RE: Referral for Home Health    As of 11/24/25, we have accepted the Home Health referral for the patient listed above.    [facility]  will contact the patient within 48 hours. If you have any questions or concerns regarding the patient’s transition to Home Health, please do not hesitate to contact us at [phone]      We look forward to collaborating with you,  [facility] Team   No
